# Patient Record
Sex: MALE | Race: OTHER | Employment: OTHER | URBAN - METROPOLITAN AREA
[De-identification: names, ages, dates, MRNs, and addresses within clinical notes are randomized per-mention and may not be internally consistent; named-entity substitution may affect disease eponyms.]

---

## 2017-03-13 PROBLEM — M51.9 LUMBAR DISC DISEASE: Status: ACTIVE | Noted: 2017-03-13

## 2017-04-21 PROBLEM — I63.9 CRYPTOGENIC STROKE (HCC): Status: ACTIVE | Noted: 2017-04-21

## 2018-03-15 ENCOUNTER — TELEPHONE (OUTPATIENT)
Dept: NON INVASIVE DIAGNOSTICS | Age: 73
End: 2018-03-15

## 2018-03-15 NOTE — TELEPHONE ENCOUNTER
----- Message from Roman Min RN sent at 3/8/2018  4:02 PM EST -----  Successful transmission received. Please call patient and give next appointment.
We have received your remote transmission. Our staff will contact you if there is anything that needs to be discussed. Your next remote is on 4/9/2018.
28

## 2018-04-09 ENCOUNTER — NURSE ONLY (OUTPATIENT)
Dept: NON INVASIVE DIAGNOSTICS | Age: 73
End: 2018-04-09
Payer: MEDICARE

## 2018-04-09 ENCOUNTER — OFFICE VISIT (OUTPATIENT)
Dept: VASCULAR SURGERY | Age: 73
End: 2018-04-09
Payer: MEDICARE

## 2018-04-09 DIAGNOSIS — I65.23 ASYMPTOMATIC BILATERAL CAROTID ARTERY STENOSIS: Primary | Chronic | ICD-10-CM

## 2018-04-09 DIAGNOSIS — I63.9 CRYPTOGENIC STROKE (HCC): ICD-10-CM

## 2018-04-09 DIAGNOSIS — Z95.818 STATUS POST PLACEMENT OF IMPLANTABLE LOOP RECORDER: Primary | ICD-10-CM

## 2018-04-09 PROCEDURE — 99213 OFFICE O/P EST LOW 20 MIN: CPT | Performed by: SURGERY

## 2018-04-09 PROCEDURE — 93299 PR PR INTERRO EVALM REMOTE, UP TO 30 DAYS: CPT | Performed by: INTERNAL MEDICINE

## 2018-04-09 PROCEDURE — 93298 REM INTERROG DEV EVAL SCRMS: CPT | Performed by: INTERNAL MEDICINE

## 2018-04-11 ENCOUNTER — TELEPHONE (OUTPATIENT)
Dept: NON INVASIVE DIAGNOSTICS | Age: 73
End: 2018-04-11

## 2018-04-11 ENCOUNTER — HOSPITAL ENCOUNTER (OUTPATIENT)
Dept: CARDIOLOGY | Age: 73
Discharge: HOME | End: 2018-04-11
Payer: MEDICARE

## 2018-04-11 DIAGNOSIS — I65.23 BILATERAL CAROTID ARTERY STENOSIS: ICD-10-CM

## 2018-04-11 DIAGNOSIS — I65.23 ASYMPTOMATIC BILATERAL CAROTID ARTERY STENOSIS: Chronic | ICD-10-CM

## 2018-04-11 PROCEDURE — 93880 EXTRACRANIAL BILAT STUDY: CPT

## 2018-04-16 ENCOUNTER — TELEPHONE (OUTPATIENT)
Dept: VASCULAR SURGERY | Age: 73
End: 2018-04-16

## 2018-04-18 ENCOUNTER — TELEPHONE (OUTPATIENT)
Dept: VASCULAR SURGERY | Age: 73
End: 2018-04-18

## 2018-05-10 ENCOUNTER — NURSE ONLY (OUTPATIENT)
Dept: NON INVASIVE DIAGNOSTICS | Age: 73
End: 2018-05-10
Payer: MEDICARE

## 2018-05-10 DIAGNOSIS — I63.9 CRYPTOGENIC STROKE (HCC): ICD-10-CM

## 2018-05-10 DIAGNOSIS — Z95.818 STATUS POST PLACEMENT OF IMPLANTABLE LOOP RECORDER: Primary | ICD-10-CM

## 2018-05-10 PROCEDURE — 93298 REM INTERROG DEV EVAL SCRMS: CPT | Performed by: INTERNAL MEDICINE

## 2018-05-10 PROCEDURE — 93299 PR PR INTERRO EVALM REMOTE, UP TO 30 DAYS: CPT | Performed by: INTERNAL MEDICINE

## 2018-06-11 ENCOUNTER — NURSE ONLY (OUTPATIENT)
Dept: NON INVASIVE DIAGNOSTICS | Age: 73
End: 2018-06-11
Payer: MEDICARE

## 2018-06-11 DIAGNOSIS — I63.9 CRYPTOGENIC STROKE (HCC): ICD-10-CM

## 2018-06-11 DIAGNOSIS — Z95.818 STATUS POST PLACEMENT OF IMPLANTABLE LOOP RECORDER: Primary | ICD-10-CM

## 2018-06-11 PROCEDURE — 93299 PR PR INTERRO EVALM REMOTE, UP TO 30 DAYS: CPT | Performed by: INTERNAL MEDICINE

## 2018-06-11 PROCEDURE — 93298 REM INTERROG DEV EVAL SCRMS: CPT | Performed by: INTERNAL MEDICINE

## 2018-07-12 ENCOUNTER — NURSE ONLY (OUTPATIENT)
Dept: NON INVASIVE DIAGNOSTICS | Age: 73
End: 2018-07-12
Payer: MEDICARE

## 2018-07-12 DIAGNOSIS — Z95.818 STATUS POST PLACEMENT OF IMPLANTABLE LOOP RECORDER: Primary | ICD-10-CM

## 2018-07-12 DIAGNOSIS — I63.9 CRYPTOGENIC STROKE (HCC): ICD-10-CM

## 2018-07-12 PROCEDURE — 93299 PR PR INTERRO EVALM REMOTE, UP TO 30 DAYS: CPT | Performed by: INTERNAL MEDICINE

## 2018-07-12 PROCEDURE — 93298 REM INTERROG DEV EVAL SCRMS: CPT | Performed by: INTERNAL MEDICINE

## 2018-07-17 ENCOUNTER — TELEPHONE (OUTPATIENT)
Dept: NON INVASIVE DIAGNOSTICS | Age: 73
End: 2018-07-17

## 2018-07-17 NOTE — TELEPHONE ENCOUNTER
----- Message from Qasim De La Cruz RN sent at 7/17/2018  8:01 AM EDT -----  Successful transmission received. Please call patient and give next appointment.

## 2018-07-17 NOTE — PROGRESS NOTES
See [employer] [place] report. Remote monitoring reviewed over a 30 day period. End of 30 day monitoring period date of service 7-.

## 2018-07-17 NOTE — TELEPHONE ENCOUNTER
We have received your remote transmission. Our staff will contact you if there is anything that needs to be discussed. Your next appointment is 08/13/2018 remote transmission from home.

## 2018-08-13 ENCOUNTER — NURSE ONLY (OUTPATIENT)
Dept: NON INVASIVE DIAGNOSTICS | Age: 73
End: 2018-08-13
Payer: MEDICARE

## 2018-08-13 ENCOUNTER — TELEPHONE (OUTPATIENT)
Dept: NON INVASIVE DIAGNOSTICS | Age: 73
End: 2018-08-13

## 2018-08-13 DIAGNOSIS — I63.9 CRYPTOGENIC STROKE (HCC): ICD-10-CM

## 2018-08-13 DIAGNOSIS — Z95.818 STATUS POST PLACEMENT OF IMPLANTABLE LOOP RECORDER: Primary | ICD-10-CM

## 2018-08-13 PROCEDURE — 93298 REM INTERROG DEV EVAL SCRMS: CPT | Performed by: INTERNAL MEDICINE

## 2018-08-13 PROCEDURE — 93299 PR PR INTERRO EVALM REMOTE, UP TO 30 DAYS: CPT | Performed by: INTERNAL MEDICINE

## 2018-09-13 ENCOUNTER — NURSE ONLY (OUTPATIENT)
Dept: NON INVASIVE DIAGNOSTICS | Age: 73
End: 2018-09-13
Payer: MEDICARE

## 2018-09-13 DIAGNOSIS — Z95.818 STATUS POST PLACEMENT OF IMPLANTABLE LOOP RECORDER: Primary | ICD-10-CM

## 2018-09-13 DIAGNOSIS — I63.9 CRYPTOGENIC STROKE (HCC): ICD-10-CM

## 2018-09-13 PROCEDURE — 93298 REM INTERROG DEV EVAL SCRMS: CPT | Performed by: INTERNAL MEDICINE

## 2018-09-13 PROCEDURE — 93299 PR PR INTERRO EVALM REMOTE, UP TO 30 DAYS: CPT | Performed by: INTERNAL MEDICINE

## 2018-09-14 ENCOUNTER — TELEPHONE (OUTPATIENT)
Dept: NON INVASIVE DIAGNOSTICS | Age: 73
End: 2018-09-14

## 2018-09-14 NOTE — TELEPHONE ENCOUNTER
----- Message from Valentino Alamo, RN sent at 9/14/2018  7:54 AM EDT -----  Successful transmission received. Please call patient and give next appointment.

## 2018-10-15 ENCOUNTER — NURSE ONLY (OUTPATIENT)
Dept: NON INVASIVE DIAGNOSTICS | Age: 73
End: 2018-10-15
Payer: MEDICARE

## 2018-10-15 DIAGNOSIS — Z95.818 STATUS POST PLACEMENT OF IMPLANTABLE LOOP RECORDER: Primary | ICD-10-CM

## 2018-10-15 DIAGNOSIS — I63.9 CRYPTOGENIC STROKE (HCC): ICD-10-CM

## 2018-10-15 PROCEDURE — 93298 REM INTERROG DEV EVAL SCRMS: CPT | Performed by: INTERNAL MEDICINE

## 2018-10-15 PROCEDURE — 93299 PR PR INTERRO EVALM REMOTE, UP TO 30 DAYS: CPT | Performed by: INTERNAL MEDICINE

## 2018-10-22 ENCOUNTER — TELEPHONE (OUTPATIENT)
Dept: NON INVASIVE DIAGNOSTICS | Age: 73
End: 2018-10-22

## 2018-11-15 ENCOUNTER — NURSE ONLY (OUTPATIENT)
Dept: NON INVASIVE DIAGNOSTICS | Age: 73
End: 2018-11-15
Payer: MEDICARE

## 2018-11-15 DIAGNOSIS — I63.9 CRYPTOGENIC STROKE (HCC): ICD-10-CM

## 2018-11-15 DIAGNOSIS — Z95.818 STATUS POST PLACEMENT OF IMPLANTABLE LOOP RECORDER: Primary | ICD-10-CM

## 2018-11-15 PROCEDURE — 93298 REM INTERROG DEV EVAL SCRMS: CPT | Performed by: INTERNAL MEDICINE

## 2018-11-15 PROCEDURE — 93299 PR PR INTERRO EVALM REMOTE, UP TO 30 DAYS: CPT | Performed by: INTERNAL MEDICINE

## 2018-11-21 ENCOUNTER — TELEPHONE (OUTPATIENT)
Dept: NON INVASIVE DIAGNOSTICS | Age: 73
End: 2018-11-21

## 2018-12-17 ENCOUNTER — NURSE ONLY (OUTPATIENT)
Dept: NON INVASIVE DIAGNOSTICS | Age: 73
End: 2018-12-17
Payer: MEDICARE

## 2018-12-17 DIAGNOSIS — Z95.818 STATUS POST PLACEMENT OF IMPLANTABLE LOOP RECORDER: Primary | ICD-10-CM

## 2018-12-17 DIAGNOSIS — I63.9 CRYPTOGENIC STROKE (HCC): ICD-10-CM

## 2018-12-17 PROCEDURE — 93298 REM INTERROG DEV EVAL SCRMS: CPT | Performed by: INTERNAL MEDICINE

## 2018-12-17 PROCEDURE — 93299 PR PR INTERRO EVALM REMOTE, UP TO 30 DAYS: CPT | Performed by: INTERNAL MEDICINE

## 2018-12-21 ENCOUNTER — TELEPHONE (OUTPATIENT)
Dept: NON INVASIVE DIAGNOSTICS | Age: 73
End: 2018-12-21

## 2019-01-17 ENCOUNTER — NURSE ONLY (OUTPATIENT)
Dept: NON INVASIVE DIAGNOSTICS | Age: 74
End: 2019-01-17
Payer: MEDICARE

## 2019-01-17 DIAGNOSIS — I63.9 CRYPTOGENIC STROKE (HCC): Primary | ICD-10-CM

## 2019-01-17 DIAGNOSIS — Z95.818 STATUS POST PLACEMENT OF IMPLANTABLE LOOP RECORDER: ICD-10-CM

## 2019-01-17 PROCEDURE — 93299 PR REM INTERROG ICPMS/SCRMS <30 D TECH REVIEW: CPT | Performed by: INTERNAL MEDICINE

## 2019-01-17 PROCEDURE — 93298 REM INTERROG DEV EVAL SCRMS: CPT | Performed by: INTERNAL MEDICINE

## 2019-01-22 ENCOUNTER — TELEPHONE (OUTPATIENT)
Dept: NON INVASIVE DIAGNOSTICS | Age: 74
End: 2019-01-22

## 2019-01-28 ENCOUNTER — HOSPITAL ENCOUNTER (OUTPATIENT)
Age: 74
Discharge: HOME | End: 2019-01-30
Payer: MEDICARE

## 2019-01-28 DIAGNOSIS — I25.10 CORONARY ARTERY DISEASE INVOLVING NATIVE CORONARY ARTERY OF NATIVE HEART WITHOUT ANGINA PECTORIS: ICD-10-CM

## 2019-01-28 DIAGNOSIS — N40.0 BENIGN PROSTATIC HYPERPLASIA WITHOUT LOWER URINARY TRACT SYMPTOMS: ICD-10-CM

## 2019-01-28 PROCEDURE — 80061 LIPID PANEL: CPT

## 2019-01-28 PROCEDURE — 85025 COMPLETE CBC W/AUTO DIFF WBC: CPT

## 2019-01-28 PROCEDURE — 80053 COMPREHEN METABOLIC PANEL: CPT

## 2019-01-28 PROCEDURE — 84153 ASSAY OF PSA TOTAL: CPT

## 2019-01-28 PROCEDURE — 83036 HEMOGLOBIN GLYCOSYLATED A1C: CPT

## 2019-01-29 LAB
ALBUMIN SERPL-MCNC: 4 G/DL (ref 3.5–5.2)
ALP BLD-CCNC: 94 U/L (ref 40–129)
ALT SERPL-CCNC: 26 U/L (ref 0–40)
ANION GAP SERPL CALCULATED.3IONS-SCNC: 18 MMOL/L (ref 7–16)
AST SERPL-CCNC: 30 U/L (ref 0–39)
BASOPHILS ABSOLUTE: 0.07 E9/L (ref 0–0.2)
BASOPHILS RELATIVE PERCENT: 1 % (ref 0–2)
BILIRUB SERPL-MCNC: 0.9 MG/DL (ref 0–1.2)
BUN BLDV-MCNC: 15 MG/DL (ref 8–23)
CALCIUM SERPL-MCNC: 9.7 MG/DL (ref 8.6–10.2)
CHLORIDE BLD-SCNC: 103 MMOL/L (ref 98–107)
CHOLESTEROL, TOTAL: 172 MG/DL (ref 0–199)
CO2: 20 MMOL/L (ref 22–29)
CREAT SERPL-MCNC: 1.1 MG/DL (ref 0.7–1.2)
EOSINOPHILS ABSOLUTE: 0.43 E9/L (ref 0.05–0.5)
EOSINOPHILS RELATIVE PERCENT: 6.4 % (ref 0–6)
GFR AFRICAN AMERICAN: >60
GFR NON-AFRICAN AMERICAN: >60 ML/MIN/1.73
GLUCOSE BLD-MCNC: 113 MG/DL (ref 74–99)
HBA1C MFR BLD: 6.3 % (ref 4–5.6)
HCT VFR BLD CALC: 46.8 % (ref 37–54)
HDLC SERPL-MCNC: 40 MG/DL
HEMOGLOBIN: 14.6 G/DL (ref 12.5–16.5)
IMMATURE GRANULOCYTES #: 0.02 E9/L
IMMATURE GRANULOCYTES %: 0.3 % (ref 0–5)
LDL CHOLESTEROL CALCULATED: 91 MG/DL (ref 0–99)
LYMPHOCYTES ABSOLUTE: 1.79 E9/L (ref 1.5–4)
LYMPHOCYTES RELATIVE PERCENT: 26.5 % (ref 20–42)
MCH RBC QN AUTO: 30.4 PG (ref 26–35)
MCHC RBC AUTO-ENTMCNC: 31.2 % (ref 32–34.5)
MCV RBC AUTO: 97.5 FL (ref 80–99.9)
MONOCYTES ABSOLUTE: 0.65 E9/L (ref 0.1–0.95)
MONOCYTES RELATIVE PERCENT: 9.6 % (ref 2–12)
NEUTROPHILS ABSOLUTE: 3.79 E9/L (ref 1.8–7.3)
NEUTROPHILS RELATIVE PERCENT: 56.2 % (ref 43–80)
PDW BLD-RTO: 14.3 FL (ref 11.5–15)
PLATELET # BLD: 212 E9/L (ref 130–450)
PMV BLD AUTO: 12.6 FL (ref 7–12)
POTASSIUM SERPL-SCNC: 4.3 MMOL/L (ref 3.5–5)
PROSTATE SPECIFIC ANTIGEN: 1.88 NG/ML (ref 0–4)
RBC # BLD: 4.8 E12/L (ref 3.8–5.8)
SODIUM BLD-SCNC: 141 MMOL/L (ref 132–146)
TOTAL PROTEIN: 7.4 G/DL (ref 6.4–8.3)
TRIGL SERPL-MCNC: 206 MG/DL (ref 0–149)
VLDLC SERPL CALC-MCNC: 41 MG/DL
WBC # BLD: 6.8 E9/L (ref 4.5–11.5)

## 2019-02-22 ENCOUNTER — NURSE ONLY (OUTPATIENT)
Dept: NON INVASIVE DIAGNOSTICS | Age: 74
End: 2019-02-22
Payer: MEDICARE

## 2019-02-22 DIAGNOSIS — Z95.818 STATUS POST PLACEMENT OF IMPLANTABLE LOOP RECORDER: Primary | ICD-10-CM

## 2019-02-22 DIAGNOSIS — I63.9 CRYPTOGENIC STROKE (HCC): ICD-10-CM

## 2019-02-22 PROCEDURE — 93299 PR REM INTERROG ICPMS/SCRMS <30 D TECH REVIEW: CPT | Performed by: INTERNAL MEDICINE

## 2019-02-22 PROCEDURE — 93298 REM INTERROG DEV EVAL SCRMS: CPT | Performed by: INTERNAL MEDICINE

## 2019-02-25 ENCOUNTER — TELEPHONE (OUTPATIENT)
Dept: NON INVASIVE DIAGNOSTICS | Age: 74
End: 2019-02-25

## 2019-03-20 ENCOUNTER — OFFICE VISIT (OUTPATIENT)
Dept: CARDIOLOGY CLINIC | Age: 74
End: 2019-03-20
Payer: MEDICARE

## 2019-03-20 VITALS
WEIGHT: 141.2 LBS | BODY MASS INDEX: 22.69 KG/M2 | SYSTOLIC BLOOD PRESSURE: 128 MMHG | RESPIRATION RATE: 16 BRPM | HEIGHT: 66 IN | DIASTOLIC BLOOD PRESSURE: 72 MMHG | HEART RATE: 63 BPM

## 2019-03-20 DIAGNOSIS — I25.10 CORONARY ARTERY DISEASE INVOLVING NATIVE CORONARY ARTERY OF NATIVE HEART WITHOUT ANGINA PECTORIS: Primary | ICD-10-CM

## 2019-03-20 PROCEDURE — 93000 ELECTROCARDIOGRAM COMPLETE: CPT | Performed by: INTERNAL MEDICINE

## 2019-03-20 PROCEDURE — 99213 OFFICE O/P EST LOW 20 MIN: CPT | Performed by: INTERNAL MEDICINE

## 2019-03-20 RX ORDER — TRAMADOL HYDROCHLORIDE 50 MG/1
TABLET ORAL
Refills: 0 | COMMUNITY
Start: 2019-02-22 | End: 2019-03-20 | Stop reason: SDUPTHER

## 2019-03-27 ENCOUNTER — OFFICE VISIT (OUTPATIENT)
Dept: NON INVASIVE DIAGNOSTICS | Age: 74
End: 2019-03-27
Payer: MEDICARE

## 2019-03-27 VITALS
WEIGHT: 140 LBS | HEIGHT: 66 IN | RESPIRATION RATE: 16 BRPM | BODY MASS INDEX: 22.5 KG/M2 | HEART RATE: 64 BPM | DIASTOLIC BLOOD PRESSURE: 68 MMHG | SYSTOLIC BLOOD PRESSURE: 138 MMHG

## 2019-03-27 DIAGNOSIS — Z95.818 STATUS POST PLACEMENT OF IMPLANTABLE LOOP RECORDER: Primary | ICD-10-CM

## 2019-03-27 PROCEDURE — 93285 PRGRMG DEV EVAL SCRMS IP: CPT | Performed by: NURSE PRACTITIONER

## 2019-03-27 PROCEDURE — 99213 OFFICE O/P EST LOW 20 MIN: CPT | Performed by: NURSE PRACTITIONER

## 2019-03-27 ASSESSMENT — ENCOUNTER SYMPTOMS
CHEST TIGHTNESS: 0
SHORTNESS OF BREATH: 0

## 2019-04-11 ENCOUNTER — NURSE ONLY (OUTPATIENT)
Dept: NON INVASIVE DIAGNOSTICS | Age: 74
End: 2019-04-11
Payer: MEDICARE

## 2019-04-11 DIAGNOSIS — Z95.818 STATUS POST PLACEMENT OF IMPLANTABLE LOOP RECORDER: Primary | ICD-10-CM

## 2019-04-11 DIAGNOSIS — I63.9 CRYPTOGENIC STROKE (HCC): ICD-10-CM

## 2019-04-11 PROCEDURE — 93299 PR REM INTERROG ICPMS/SCRMS <30 D TECH REVIEW: CPT | Performed by: INTERNAL MEDICINE

## 2019-04-11 PROCEDURE — 93298 REM INTERROG DEV EVAL SCRMS: CPT | Performed by: INTERNAL MEDICINE

## 2019-04-15 ENCOUNTER — OFFICE VISIT (OUTPATIENT)
Dept: VASCULAR SURGERY | Age: 74
End: 2019-04-15
Payer: MEDICARE

## 2019-04-15 DIAGNOSIS — I65.23 ASYMPTOMATIC BILATERAL CAROTID ARTERY STENOSIS: Primary | ICD-10-CM

## 2019-04-15 PROCEDURE — 99213 OFFICE O/P EST LOW 20 MIN: CPT | Performed by: SURGERY

## 2019-04-15 NOTE — PROGRESS NOTES
Vascular Surgery Outpatient Followup    PCP : Mary Hyman DO   EP : Dr. Briana Mclean  Neurology : Dr. Santhosh Roberts:    The patient is a 66 y.o. male who is here in regards to follow up his assx carotid disease. He continues to have issues with chronic L LE pain, numbness, and tingling. He has pain and a burning sensation in both legs that starts in his lower back. He describes these pains as shooting pains and rates it 9/10. The pain is generally only at night while he is lying down. He denies and palliating or exacerbating factors. He takes no medication for it. He denies rest pain, open wounds, or claudicating symptoms. 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 He had no significant improvement.      No sxs of stroke, tia, slurred speech, focal weakness since seen last.      He was originally seen in October 2016 while he was admitted with an episode of double vision and dizziness. There was a concern that he may have had a vertebral basilar TIA. He had rapid resolution of his symptoms and no subsequent recurrence. There was no stroke imaged on MR imaging.     He had a link monitor placed by Dr. Briana Mclean from [employer]. He continues to follow with him.   He was noted to have a PFO on echo.       Past Medical History:        Diagnosis Date    Anxiety     Asymptomatic bilateral carotid artery stenosis     Bowel obstruction (HCC)     CAD (coronary artery disease)     [facility]ch[facility]     Cryptogenic stroke [facility] [facility]     NSTEMI (non-ST elevated myocardial infarction) [facility])      Past Surgical History:        Procedure Laterality Date    BACK SURGERY      CARDIAC CATHETERIZATION  4/26/2016    Dr Ted Valdez GRAFT  4/27/16    x3    DIAGNOSTIC CARDIAC CATH LAB PROCEDURE  04/26/2016    INSERTABLE CARDIAC MONITOR Left 10/11/2016    LINQ loop recorder, [employer], Dr. Briana Mclean Current Medications:   Current Outpatient Medications   Medication Sig Dispense Refill    zolpidem (AMBIEN) 10 MG tablet Take 1 tablet by mouth every evening for 30 days. 30 tablet 0    traMADol (ULTRAM) 50 MG tablet Take 1 tablet by mouth every 8 hours as needed for Pain for up to 30 days. 90 tablet 0    metoprolol tartrate (LOPRESSOR) 25 MG tablet TAKE 1/2 TABLET BY MOUTH TWICE DAILY 90 tablet 3    atorvastatin (LIPITOR) 40 MG tablet       clopidogrel (PLAVIX) 75 MG tablet Take 1 tablet by mouth daily 30 tablet 3    pantoprazole (PROTONIX) 40 MG tablet TK 1 T PO QD  3    gabapentin (NEURONTIN) 300 MG capsule TAKE ONE CAPSULE BY MOUTH TWICE DAILY FOR 30 DAYS 60 capsule 3     No current facility-administered medications for this visit. Allergies:  Patient has no known allergies.   Social History     Socioeconomic History    Marital status:      Spouse name: Not on file    Number of children: Not on file    Years of education: Not on file    Highest education level: Not on file   Occupational History    Occupation: - retired   Social Needs    Financial resource strain: Not on file    Food insecurity:     Worry: Not on file     Inability: Not on file   [place] needs:     Medical: Not on file     Non-medical: Not on file   Tobacco Use    Smoking status: Former Smoker     Packs/day: 1.00     Years: 56.00     Pack years: 56.00     Types: Cigarettes     Start date:      Last attempt to quit: 2016     Years since quittin.9    Smokeless tobacco: Never Used   Substance and Sexual Activity    Alcohol use: No    Drug use: No    Sexual activity: Not Currently   Lifestyle    Physical activity:     Days per week: Not on file     Minutes per session: Not on file    Stress: Not on file   Relationships    Social connections:     Talks on phone: Not on file     Gets together: Not on file     Attends Christianity service: Not on file     Active member of club or organization: Not on file     Attends meetings of clubs or organizations: Not on file     Relationship status: Not on file    Intimate partner violence:     Fear of current or ex partner: Not on file     Emotionally abused: Not on file     Physically abused: Not on file     Forced sexual activity: Not on file   Other Topics Concern    Not on file   Social History Narrative    Not on file     Family History   Problem Relation Age of Onset    Heart Surgery Mother     Hypertension Mother     Heart Attack Father     Heart Surgery Brother      Labs  Lab Results   Component Value Date    WBC 6.8 01/28/2019    HGB 14.6 01/28/2019    HCT 46.8 01/28/2019     01/28/2019    PROTIME 11.9 10/10/2016    INR 1.1 10/10/2016    APTT 28.4 10/05/2016    K 4.3 01/28/2019    BUN 15 01/28/2019    CREATININE 1.1 01/28/2019       PHYSICAL EXAM:    CONSTITUTIONAL:   Awake, alert, cooperative  PSYCHIATRIC :  Oriented to time, place and person     Appropriate insight to disease process  EYES: Lids and lashes normal  ENT:  External ears and nose without lesions   Hearing deficits absent  NECK: Supple, symmetrical, trachea midline   Carotid bruit not noted  LUNGS:  No increased work of breathing                 Clear to auscultation  CARDIOVASCULAR:  regular rate and rhythm   ABDOMEN:  soft, non-distended, non-tender   Aorta is not palpable  SKIN:   Normal skin color   Texture and turgor normal, no induration  EXTREMITIES:   R LE Edema none  L LE Edema none    Decreased sensation in medial aspect of calf   R popliteal 2+ L popliteal 2+   R posterior tibial 2+ L posterior tibial 2+     RADIOLOGY:    A/P Asymptomatic Bilateral Carotid Stenosis  · He did not have imaging study done today  · US from 4/2018 reveals   ? R ICA 1-49% stenosis which was stable at that time  ?  L ICA 50-59% stenosis which was stable at that time  · Continue medical management with plavix and statin  · Emphasized importance of continued Tobacco cessation  · No indication for surgical intervention at this time  · Discussed with patient pathophysiology of carotid stenosis and all ?s answered  · Discussed with patient symptoms of stroke, TIA and they understood to go to ER immediately if any symptoms developed  · Will obtain us in near future  · F/U as outpatient in 12 Months with repeat carotid duplex assuming no significant changes    Bilateral LE Pain, numbness and tingling  Chronic back pain with sciatica  · He is not having symptoms of rest pain, tissue loss, or claudication  · Symptoms continue to be an issue  · F/u as needed at [facility]    Pt seen and plan reviewed with Dr. Madhavi Gao.      Alley Montoya, CNP

## 2019-04-16 ENCOUNTER — HOSPITAL ENCOUNTER (OUTPATIENT)
Dept: CARDIOLOGY | Age: 74
Discharge: HOME | End: 2019-04-16
Payer: MEDICARE

## 2019-04-16 ENCOUNTER — TELEPHONE (OUTPATIENT)
Dept: NON INVASIVE DIAGNOSTICS | Age: 74
End: 2019-04-16

## 2019-04-16 DIAGNOSIS — I65.23 ASYMPTOMATIC BILATERAL CAROTID ARTERY STENOSIS: ICD-10-CM

## 2019-04-16 PROCEDURE — 93880 EXTRACRANIAL BILAT STUDY: CPT

## 2019-04-16 NOTE — TELEPHONE ENCOUNTER
----- Message from Belinda Jurado RN sent at 4/16/2019 10:40 AM EDT -----  Successful transmission received. Please call patient and give next appointment.

## 2019-04-16 NOTE — TELEPHONE ENCOUNTER
We have received your remote transmission. Our staff will contact you if there is anything that needs to be discussed. Your next appointment is 05/14/2019 remote transmission from home    Pt is non-wireless.

## 2019-04-16 NOTE — PROGRESS NOTES
See [employer] [place] report. Remote monitoring reviewed over a 30 day period. End of 30 day monitoring period date of service 4-11-19.

## 2019-04-16 NOTE — PROGRESS NOTES
[facility] - [facility]    This is a pre read worksheet - prior to official physician interpretation    Chulaюлия Leandra  1945  Date of study: 4/16/19    Indication for study:  Carotid artery stenosis  Study : Bilateral Carotid Artery Duplex Examination    Duplex examination of the RIGHT carotid artery system identifies atherosclerotic plaque. The peak systolic velocity in internal carotid artery was 118 centimeters / second. The maximum end diastolic velocity was 30 centimeters / second. The ICA/CCA ratio is 1.2. The right vertebral artery has antegrade flow. Duplex examination of the LEFT carotid artery system identifies atherosclerotic plaque. The peak systolic velocity in internal carotid artery was 170 centimeters / second. The maximum end diastolic velocity was 40 centimeters / second. The ICA/CCA ratio is 1.7. The left vertebral artery has antegrade flow.         LAST STUDY  4/11/2018  Rt 1-49  Lt 50-59

## 2019-04-23 ENCOUNTER — TELEPHONE (OUTPATIENT)
Dept: VASCULAR SURGERY | Age: 74
End: 2019-04-23

## 2019-04-23 NOTE — TELEPHONE ENCOUNTER
Called and spoke to patient regarding results of carotid ultrasound. Stable bilateral carotid artery stenosis. Plan to see back in one year with another ultrasound.      Robyn Edmondson, CNP

## 2019-04-29 ENCOUNTER — HOSPITAL ENCOUNTER (OUTPATIENT)
Age: 74
Discharge: HOME | End: 2019-05-01
Payer: MEDICARE

## 2019-04-29 PROBLEM — R73.03 PREDIABETES: Status: ACTIVE | Noted: 2019-04-29

## 2019-04-29 PROCEDURE — 85025 COMPLETE CBC W/AUTO DIFF WBC: CPT

## 2019-04-29 PROCEDURE — 80061 LIPID PANEL: CPT

## 2019-04-29 PROCEDURE — 82044 UR ALBUMIN SEMIQUANTITATIVE: CPT

## 2019-04-29 PROCEDURE — 83036 HEMOGLOBIN GLYCOSYLATED A1C: CPT

## 2019-04-29 PROCEDURE — 80053 COMPREHEN METABOLIC PANEL: CPT

## 2019-04-29 PROCEDURE — 82570 ASSAY OF URINE CREATININE: CPT

## 2019-04-30 DIAGNOSIS — R73.03 PREDIABETES: ICD-10-CM

## 2019-04-30 DIAGNOSIS — I10 ESSENTIAL HYPERTENSION: ICD-10-CM

## 2019-04-30 LAB
ALBUMIN SERPL-MCNC: 4.1 G/DL (ref 3.5–5.2)
ALP BLD-CCNC: 105 U/L (ref 40–129)
ALT SERPL-CCNC: 29 U/L (ref 0–40)
ANION GAP SERPL CALCULATED.3IONS-SCNC: 12 MMOL/L (ref 7–16)
AST SERPL-CCNC: 41 U/L (ref 0–39)
BASOPHILS ABSOLUTE: 0.06 E9/L (ref 0–0.2)
BASOPHILS RELATIVE PERCENT: 0.9 % (ref 0–2)
BILIRUB SERPL-MCNC: 0.6 MG/DL (ref 0–1.2)
BUN BLDV-MCNC: 15 MG/DL (ref 8–23)
CALCIUM SERPL-MCNC: 9.5 MG/DL (ref 8.6–10.2)
CHLORIDE BLD-SCNC: 105 MMOL/L (ref 98–107)
CHOLESTEROL, TOTAL: 131 MG/DL (ref 0–199)
CO2: 24 MMOL/L (ref 22–29)
CREAT SERPL-MCNC: 1.1 MG/DL (ref 0.7–1.2)
CREATININE URINE: 210 MG/DL (ref 40–278)
EOSINOPHILS ABSOLUTE: 0.4 E9/L (ref 0.05–0.5)
EOSINOPHILS RELATIVE PERCENT: 5.7 % (ref 0–6)
GFR AFRICAN AMERICAN: >60
GFR NON-AFRICAN AMERICAN: >60 ML/MIN/1.73
GLUCOSE BLD-MCNC: 111 MG/DL (ref 74–99)
HBA1C MFR BLD: 5.8 % (ref 4–5.6)
HCT VFR BLD CALC: 45 % (ref 37–54)
HDLC SERPL-MCNC: 33 MG/DL
HEMOGLOBIN: 14.1 G/DL (ref 12.5–16.5)
IMMATURE GRANULOCYTES #: 0.02 E9/L
IMMATURE GRANULOCYTES %: 0.3 % (ref 0–5)
LDL CHOLESTEROL CALCULATED: 73 MG/DL (ref 0–99)
LYMPHOCYTES ABSOLUTE: 1.91 E9/L (ref 1.5–4)
LYMPHOCYTES RELATIVE PERCENT: 27.4 % (ref 20–42)
MCH RBC QN AUTO: 30.9 PG (ref 26–35)
MCHC RBC AUTO-ENTMCNC: 31.3 % (ref 32–34.5)
MCV RBC AUTO: 98.7 FL (ref 80–99.9)
MICROALBUMIN UR-MCNC: <12 MG/L
MICROALBUMIN/CREAT UR-RTO: ABNORMAL (ref 0–30)
MONOCYTES ABSOLUTE: 0.68 E9/L (ref 0.1–0.95)
MONOCYTES RELATIVE PERCENT: 9.8 % (ref 2–12)
NEUTROPHILS ABSOLUTE: 3.89 E9/L (ref 1.8–7.3)
NEUTROPHILS RELATIVE PERCENT: 55.9 % (ref 43–80)
PDW BLD-RTO: 14.9 FL (ref 11.5–15)
PLATELET # BLD: 213 E9/L (ref 130–450)
PMV BLD AUTO: 12.5 FL (ref 7–12)
POTASSIUM SERPL-SCNC: 4.6 MMOL/L (ref 3.5–5)
RBC # BLD: 4.56 E12/L (ref 3.8–5.8)
SODIUM BLD-SCNC: 141 MMOL/L (ref 132–146)
TOTAL PROTEIN: 7.5 G/DL (ref 6.4–8.3)
TRIGL SERPL-MCNC: 126 MG/DL (ref 0–149)
VLDLC SERPL CALC-MCNC: 25 MG/DL
WBC # BLD: 7 E9/L (ref 4.5–11.5)

## 2019-05-14 ENCOUNTER — NURSE ONLY (OUTPATIENT)
Dept: NON INVASIVE DIAGNOSTICS | Age: 74
End: 2019-05-14
Payer: MEDICARE

## 2019-05-14 DIAGNOSIS — I63.9 CRYPTOGENIC STROKE (HCC): ICD-10-CM

## 2019-05-14 DIAGNOSIS — Z95.818 STATUS POST PLACEMENT OF IMPLANTABLE LOOP RECORDER: Primary | ICD-10-CM

## 2019-05-14 PROCEDURE — 93299 PR REM INTERROG ICPMS/SCRMS <30 D TECH REVIEW: CPT | Performed by: INTERNAL MEDICINE

## 2019-05-14 PROCEDURE — 93298 REM INTERROG DEV EVAL SCRMS: CPT | Performed by: INTERNAL MEDICINE

## 2019-05-20 ENCOUNTER — TELEPHONE (OUTPATIENT)
Dept: NON INVASIVE DIAGNOSTICS | Age: 74
End: 2019-05-20

## 2019-05-20 NOTE — PROGRESS NOTES
See [employer] [place] report. Remote monitoring reviewed over a 30 day period. End of 30 day monitoring period date of service 5-14-19.

## 2019-05-20 NOTE — TELEPHONE ENCOUNTER
We have received your remote transmission. Our staff will contact you if there is anything that needs to be discussed. Your next appointment is 06/14/2019 remote transmission from home    Pt is non-wireless.

## 2019-06-14 ENCOUNTER — NURSE ONLY (OUTPATIENT)
Dept: NON INVASIVE DIAGNOSTICS | Age: 74
End: 2019-06-14
Payer: MEDICARE

## 2019-06-14 DIAGNOSIS — Z95.818 STATUS POST PLACEMENT OF IMPLANTABLE LOOP RECORDER: Primary | ICD-10-CM

## 2019-06-14 DIAGNOSIS — I63.9 CRYPTOGENIC STROKE (HCC): ICD-10-CM

## 2019-06-14 PROCEDURE — 93299 PR REM INTERROG ICPMS/SCRMS <30 D TECH REVIEW: CPT | Performed by: INTERNAL MEDICINE

## 2019-06-14 PROCEDURE — 93298 REM INTERROG DEV EVAL SCRMS: CPT | Performed by: INTERNAL MEDICINE

## 2019-07-01 ENCOUNTER — TELEPHONE (OUTPATIENT)
Dept: NON INVASIVE DIAGNOSTICS | Age: 74
End: 2019-07-01

## 2019-07-15 ENCOUNTER — NURSE ONLY (OUTPATIENT)
Dept: NON INVASIVE DIAGNOSTICS | Age: 74
End: 2019-07-15
Payer: MEDICARE

## 2019-07-15 DIAGNOSIS — I63.9 CRYPTOGENIC STROKE (HCC): ICD-10-CM

## 2019-07-15 DIAGNOSIS — Z95.818 STATUS POST PLACEMENT OF IMPLANTABLE LOOP RECORDER: Primary | ICD-10-CM

## 2019-07-15 PROCEDURE — 93298 REM INTERROG DEV EVAL SCRMS: CPT | Performed by: INTERNAL MEDICINE

## 2019-07-15 PROCEDURE — 93299 PR REM INTERROG ICPMS/SCRMS <30 D TECH REVIEW: CPT | Performed by: INTERNAL MEDICINE

## 2019-07-25 ENCOUNTER — TELEPHONE (OUTPATIENT)
Dept: NON INVASIVE DIAGNOSTICS | Age: 74
End: 2019-07-25

## 2019-08-16 ENCOUNTER — NURSE ONLY (OUTPATIENT)
Dept: NON INVASIVE DIAGNOSTICS | Age: 74
End: 2019-08-16
Payer: MEDICARE

## 2019-08-16 DIAGNOSIS — Z95.818 STATUS POST PLACEMENT OF IMPLANTABLE LOOP RECORDER: Primary | ICD-10-CM

## 2019-08-16 DIAGNOSIS — I63.9 CRYPTOGENIC STROKE (HCC): ICD-10-CM

## 2019-08-16 PROCEDURE — 93299 PR REM INTERROG ICPMS/SCRMS <30 D TECH REVIEW: CPT | Performed by: INTERNAL MEDICINE

## 2019-08-16 PROCEDURE — 93298 REM INTERROG DEV EVAL SCRMS: CPT | Performed by: INTERNAL MEDICINE

## 2019-08-20 ENCOUNTER — HOSPITAL ENCOUNTER (OUTPATIENT)
Dept: CT IMAGING | Age: 74
Discharge: HOME | End: 2019-08-22
Payer: MEDICARE

## 2019-08-20 DIAGNOSIS — F17.211 CIGARETTE NICOTINE DEPENDENCE IN REMISSION: ICD-10-CM

## 2019-08-20 PROCEDURE — G0297 LDCT FOR LUNG CA SCREEN: HCPCS

## 2019-08-27 ENCOUNTER — TELEPHONE (OUTPATIENT)
Dept: NON INVASIVE DIAGNOSTICS | Age: 74
End: 2019-08-27

## 2019-09-17 ENCOUNTER — NURSE ONLY (OUTPATIENT)
Dept: NON INVASIVE DIAGNOSTICS | Age: 74
End: 2019-09-17
Payer: MEDICARE

## 2019-09-17 DIAGNOSIS — Z95.818 STATUS POST PLACEMENT OF IMPLANTABLE LOOP RECORDER: Primary | ICD-10-CM

## 2019-09-17 DIAGNOSIS — I63.9 CRYPTOGENIC STROKE (HCC): ICD-10-CM

## 2019-09-17 PROCEDURE — 93298 REM INTERROG DEV EVAL SCRMS: CPT | Performed by: INTERNAL MEDICINE

## 2019-09-17 PROCEDURE — 93299 PR REM INTERROG ICPMS/SCRMS <30 D TECH REVIEW: CPT | Performed by: INTERNAL MEDICINE

## 2019-10-01 ENCOUNTER — TELEPHONE (OUTPATIENT)
Dept: NON INVASIVE DIAGNOSTICS | Age: 74
End: 2019-10-01

## 2019-10-21 ENCOUNTER — NURSE ONLY (OUTPATIENT)
Dept: NON INVASIVE DIAGNOSTICS | Age: 74
End: 2019-10-21
Payer: MEDICARE

## 2019-10-21 DIAGNOSIS — I63.9 CRYPTOGENIC STROKE (HCC): ICD-10-CM

## 2019-10-21 DIAGNOSIS — Z95.818 STATUS POST PLACEMENT OF IMPLANTABLE LOOP RECORDER: Primary | ICD-10-CM

## 2019-10-21 PROCEDURE — 93299 PR REM INTERROG ICPMS/SCRMS <30 D TECH REVIEW: CPT | Performed by: INTERNAL MEDICINE

## 2019-10-21 PROCEDURE — 93298 REM INTERROG DEV EVAL SCRMS: CPT | Performed by: INTERNAL MEDICINE

## 2019-11-04 ENCOUNTER — TELEPHONE (OUTPATIENT)
Dept: NON INVASIVE DIAGNOSTICS | Age: 74
End: 2019-11-04

## 2019-11-25 ENCOUNTER — NURSE ONLY (OUTPATIENT)
Dept: NON INVASIVE DIAGNOSTICS | Age: 74
End: 2019-11-25
Payer: MEDICARE

## 2019-11-25 DIAGNOSIS — Z95.818 STATUS POST PLACEMENT OF IMPLANTABLE LOOP RECORDER: Primary | ICD-10-CM

## 2019-11-25 DIAGNOSIS — I63.9 CRYPTOGENIC STROKE (HCC): ICD-10-CM

## 2019-11-25 PROCEDURE — 93299 PR REM INTERROG ICPMS/SCRMS <30 D TECH REVIEW: CPT | Performed by: INTERNAL MEDICINE

## 2019-11-25 PROCEDURE — 93298 REM INTERROG DEV EVAL SCRMS: CPT | Performed by: INTERNAL MEDICINE

## 2019-12-06 ENCOUNTER — TELEPHONE (OUTPATIENT)
Dept: NON INVASIVE DIAGNOSTICS | Age: 74
End: 2019-12-06

## 2019-12-26 ENCOUNTER — NURSE ONLY (OUTPATIENT)
Dept: NON INVASIVE DIAGNOSTICS | Age: 74
End: 2019-12-26
Payer: MEDICARE

## 2019-12-26 PROCEDURE — 93299 PR REM INTERROG ICPMS/SCRMS <30 D TECH REVIEW: CPT | Performed by: INTERNAL MEDICINE

## 2019-12-26 PROCEDURE — 93298 REM INTERROG DEV EVAL SCRMS: CPT | Performed by: INTERNAL MEDICINE

## 2020-01-08 PROBLEM — M72.0 DUPUYTREN'S DISEASE OF PALM OF LEFT HAND: Status: ACTIVE | Noted: 2020-01-08

## 2020-01-14 ENCOUNTER — TELEPHONE (OUTPATIENT)
Dept: NON INVASIVE DIAGNOSTICS | Age: 75
End: 2020-01-14

## 2020-01-27 ENCOUNTER — NURSE ONLY (OUTPATIENT)
Dept: NON INVASIVE DIAGNOSTICS | Age: 75
End: 2020-01-27
Payer: MEDICARE

## 2020-01-27 PROCEDURE — 93298 REM INTERROG DEV EVAL SCRMS: CPT | Performed by: INTERNAL MEDICINE

## 2020-02-04 NOTE — PROGRESS NOTES
See [employer] [place] report. Remote monitoring reviewed over a 30 day period. End of 30 day monitoring period date of service 1.27.2020.

## 2020-02-07 ENCOUNTER — TELEPHONE (OUTPATIENT)
Dept: NON INVASIVE DIAGNOSTICS | Age: 75
End: 2020-02-07

## 2020-03-02 ENCOUNTER — NURSE ONLY (OUTPATIENT)
Dept: NON INVASIVE DIAGNOSTICS | Age: 75
End: 2020-03-02
Payer: MEDICARE

## 2020-03-02 PROCEDURE — 93298 REM INTERROG DEV EVAL SCRMS: CPT | Performed by: INTERNAL MEDICINE

## 2020-03-09 ENCOUNTER — TELEPHONE (OUTPATIENT)
Dept: NON INVASIVE DIAGNOSTICS | Age: 75
End: 2020-03-09

## 2020-03-09 NOTE — TELEPHONE ENCOUNTER
----- Message from Antonio Tamayo RN sent at 3/5/2020  1:27 PM EST -----  Successful transmission received. Please call patient and give next appointment.

## 2020-04-06 ENCOUNTER — NURSE ONLY (OUTPATIENT)
Dept: NON INVASIVE DIAGNOSTICS | Age: 75
End: 2020-04-06
Payer: MEDICARE

## 2020-04-06 PROCEDURE — G2066 INTER DEVC REMOTE 30D: HCPCS | Performed by: INTERNAL MEDICINE

## 2020-04-06 PROCEDURE — 93298 REM INTERROG DEV EVAL SCRMS: CPT | Performed by: INTERNAL MEDICINE

## 2020-05-07 ENCOUNTER — NURSE ONLY (OUTPATIENT)
Dept: NON INVASIVE DIAGNOSTICS | Age: 75
End: 2020-05-07
Payer: MEDICARE

## 2020-05-07 PROCEDURE — 93298 REM INTERROG DEV EVAL SCRMS: CPT | Performed by: INTERNAL MEDICINE

## 2020-05-07 PROCEDURE — G2066 INTER DEVC REMOTE 30D: HCPCS | Performed by: INTERNAL MEDICINE

## 2020-05-08 ENCOUNTER — HOSPITAL ENCOUNTER (OUTPATIENT)
Dept: CARDIOLOGY | Age: 75
Discharge: HOME | End: 2020-05-08
Payer: MEDICARE

## 2020-05-08 PROCEDURE — 93880 EXTRACRANIAL BILAT STUDY: CPT

## 2020-05-27 PROBLEM — F51.01 PRIMARY INSOMNIA: Status: ACTIVE | Noted: 2020-05-27

## 2020-05-27 PROBLEM — I65.23 CAROTID ATHEROSCLEROSIS, BILATERAL: Status: ACTIVE | Noted: 2020-05-27

## 2020-06-01 ENCOUNTER — HOSPITAL ENCOUNTER (OUTPATIENT)
Age: 75
Discharge: HOME | End: 2020-06-03
Payer: MEDICARE

## 2020-06-01 PROCEDURE — 84443 ASSAY THYROID STIM HORMONE: CPT

## 2020-06-01 PROCEDURE — 85025 COMPLETE CBC W/AUTO DIFF WBC: CPT

## 2020-06-01 PROCEDURE — G0103 PSA SCREENING: HCPCS

## 2020-06-01 PROCEDURE — 80053 COMPREHEN METABOLIC PANEL: CPT

## 2020-06-01 PROCEDURE — 80061 LIPID PANEL: CPT

## 2020-06-01 PROCEDURE — 83036 HEMOGLOBIN GLYCOSYLATED A1C: CPT

## 2020-06-02 LAB
ALBUMIN SERPL-MCNC: 4 G/DL (ref 3.5–5.2)
ALP BLD-CCNC: 99 U/L (ref 40–129)
ALT SERPL-CCNC: 28 U/L (ref 0–40)
ANION GAP SERPL CALCULATED.3IONS-SCNC: 15 MMOL/L (ref 7–16)
AST SERPL-CCNC: 37 U/L (ref 0–39)
BASOPHILS ABSOLUTE: 0.04 E9/L (ref 0–0.2)
BASOPHILS RELATIVE PERCENT: 0.6 % (ref 0–2)
BILIRUB SERPL-MCNC: 1.2 MG/DL (ref 0–1.2)
BUN BLDV-MCNC: 14 MG/DL (ref 8–23)
CALCIUM SERPL-MCNC: 9.8 MG/DL (ref 8.6–10.2)
CHLORIDE BLD-SCNC: 103 MMOL/L (ref 98–107)
CHOLESTEROL, TOTAL: 171 MG/DL (ref 0–199)
CO2: 23 MMOL/L (ref 22–29)
CREAT SERPL-MCNC: 1.1 MG/DL (ref 0.7–1.2)
EOSINOPHILS ABSOLUTE: 0.41 E9/L (ref 0.05–0.5)
EOSINOPHILS RELATIVE PERCENT: 5.8 % (ref 0–6)
GFR AFRICAN AMERICAN: >60
GFR NON-AFRICAN AMERICAN: >60 ML/MIN/1.73
GLUCOSE FASTING: 103 MG/DL (ref 74–99)
HBA1C MFR BLD: 6.9 % (ref 4–5.6)
HCT VFR BLD CALC: 46.3 % (ref 37–54)
HDLC SERPL-MCNC: 36 MG/DL
HEMOGLOBIN: 14.7 G/DL (ref 12.5–16.5)
IMMATURE GRANULOCYTES #: 0.02 E9/L
IMMATURE GRANULOCYTES %: 0.3 % (ref 0–5)
LDL CHOLESTEROL CALCULATED: 107 MG/DL (ref 0–99)
LYMPHOCYTES ABSOLUTE: 2.38 E9/L (ref 1.5–4)
LYMPHOCYTES RELATIVE PERCENT: 33.5 % (ref 20–42)
MCH RBC QN AUTO: 30.9 PG (ref 26–35)
MCHC RBC AUTO-ENTMCNC: 31.7 % (ref 32–34.5)
MCV RBC AUTO: 97.3 FL (ref 80–99.9)
MONOCYTES ABSOLUTE: 0.88 E9/L (ref 0.1–0.95)
MONOCYTES RELATIVE PERCENT: 12.4 % (ref 2–12)
NEUTROPHILS ABSOLUTE: 3.37 E9/L (ref 1.8–7.3)
NEUTROPHILS RELATIVE PERCENT: 47.4 % (ref 43–80)
PDW BLD-RTO: 14.9 FL (ref 11.5–15)
PLATELET # BLD: 199 E9/L (ref 130–450)
PMV BLD AUTO: 12.6 FL (ref 7–12)
POTASSIUM SERPL-SCNC: 4.9 MMOL/L (ref 3.5–5)
PROSTATE SPECIFIC ANTIGEN: 1.81 NG/ML (ref 0–4)
RBC # BLD: 4.76 E12/L (ref 3.8–5.8)
SODIUM BLD-SCNC: 141 MMOL/L (ref 132–146)
TOTAL PROTEIN: 7.2 G/DL (ref 6.4–8.3)
TRIGL SERPL-MCNC: 140 MG/DL (ref 0–149)
TSH SERPL DL<=0.05 MIU/L-ACNC: 2.37 UIU/ML (ref 0.27–4.2)
VLDLC SERPL CALC-MCNC: 28 MG/DL
WBC # BLD: 7.1 E9/L (ref 4.5–11.5)

## 2020-06-09 ENCOUNTER — TELEPHONE (OUTPATIENT)
Dept: NON INVASIVE DIAGNOSTICS | Age: 75
End: 2020-06-09

## 2020-06-09 NOTE — TELEPHONE ENCOUNTER
Spoke with patient, he will have his daughter call back regarding scheduling telephone or virtual visit.

## 2020-06-15 ENCOUNTER — VIRTUAL VISIT (OUTPATIENT)
Dept: VASCULAR SURGERY | Age: 75
End: 2020-06-15
Payer: MEDICARE

## 2020-06-15 PROCEDURE — 99213 OFFICE O/P EST LOW 20 MIN: CPT | Performed by: SURGERY

## 2020-06-15 NOTE — PROGRESS NOTES
Current Medications:   Current Outpatient Medications   Medication Sig Dispense Refill    XIAFLEX 0.9 MG SOLR injection       traMADol (ULTRAM) 50 MG tablet Take 1 tablet by mouth every 8 hours as needed for Pain for up to 30 days. 90 tablet 0    zolpidem (AMBIEN) 10 MG tablet Take 1 tablet by mouth every evening for 30 days. 30 tablet 0    gabapentin (NEURONTIN) 300 MG capsule TAKE 1 CAPSULE BY MOUTH TWICE DAILY 60 capsule 1    metoprolol tartrate (LOPRESSOR) 25 MG tablet TAKE 1/2 TABLET BY MOUTH TWICE DAILY 90 tablet 3    atorvastatin (LIPITOR) 40 MG tablet       clopidogrel (PLAVIX) 75 MG tablet Take 1 tablet by mouth daily 30 tablet 3    pantoprazole (PROTONIX) 40 MG tablet TK 1 T PO QD  3     No current facility-administered medications for this visit. Allergies:  Patient has no known allergies.   Social History     Socioeconomic History    Marital status:      Spouse name: Not on file    Number of children: Not on file    Years of education: Not on file    Highest education level: Not on file   Occupational History    Occupation: - retired   Social Needs    Financial resource strain: Not on file    Food insecurity     Worry: Not on file     Inability: Not on file   [employer] needs     Medical: Not on file     Non-medical: Not on file   Tobacco Use    Smoking status: Former Smoker     Packs/day: 1.00     Years: 56.00     Pack years: 56.00     Types: Cigarettes     Start date:      Last attempt to quit: 2016     Years since quittin.1    Smokeless tobacco: Never Used   Substance and Sexual Activity    Alcohol use: No    Drug use: No    Sexual activity: Not Currently   Lifestyle    Physical activity     Days per week: Not on file     Minutes per session: Not on file    Stress: Not on file   Relationships    Social connections     Talks on phone: Not on file     Gets together: Not on file     Attends Hindu service: Not on file     Active

## 2020-06-16 NOTE — PATIENT INSTRUCTIONS
Patient Education        Carotid Stenosis: Care Instructions  Your Care Instructions     Carotid stenosis is narrowing of one or both of the carotid arteries. These arteries take blood from the heart to the brain. There is one on each side of the neck. A substance called plaque builds up inside an artery. This makes it too narrow. Plaque comes from damage to the artery over time. This damage may be caused by high blood pressure, high cholesterol, diabetes, or smoking. Sometimes plaque can break loose from the carotid artery and move to the brain. This can cause a stroke or transient ischemic attack (TIA). The goal of treatment is to lower your risk of having a stroke or TIA. You can lower your risk by making healthy lifestyle changes and taking medicine. Sometimes a surgery or procedure is done. Follow-up care is a key part of your treatment and safety. Be sure to make and go to all appointments, and call your doctor if you are having problems. It's also a good idea to know your test results and keep a list of the medicines you take. How can you care for yourself at home? · Take your medicines exactly as prescribed. Call your doctor if you think you are having a problem with your medicine. You may take medicine to lower your blood pressure, to lower your cholesterol, or to prevent blood clots. · If you take a blood thinner, such as aspirin, be sure to get instructions about how to take your medicine safely. Blood thinners can cause serious bleeding problems. · Do not smoke. People who smoke have a higher chance of stroke than those who quit. If you need help quitting, talk to your doctor about stop-smoking programs and medicines. These can increase your chances of quitting for good. · Eat a healthy diet that is low in saturated fat and salt. Eat lots of fresh fruits and vegetables and foods high in fiber. · Stay at a healthy weight. Lose weight if you need to.   · Talk to your doctor about starting an

## 2020-07-17 ENCOUNTER — HOSPITAL ENCOUNTER (EMERGENCY)
Age: 75
Discharge: HOME | End: 2020-07-17
Attending: EMERGENCY MEDICINE
Payer: MEDICARE

## 2020-07-17 ENCOUNTER — APPOINTMENT (OUTPATIENT)
Dept: CT IMAGING | Age: 75
End: 2020-07-17
Payer: MEDICARE

## 2020-07-17 ENCOUNTER — APPOINTMENT (OUTPATIENT)
Dept: GENERAL RADIOLOGY | Age: 75
End: 2020-07-17
Payer: MEDICARE

## 2020-07-17 VITALS
WEIGHT: 137 LBS | TEMPERATURE: 97.6 F | SYSTOLIC BLOOD PRESSURE: 137 MMHG | HEART RATE: 68 BPM | RESPIRATION RATE: 14 BRPM | DIASTOLIC BLOOD PRESSURE: 61 MMHG | OXYGEN SATURATION: 99 % | HEIGHT: 66 IN | BODY MASS INDEX: 22.02 KG/M2

## 2020-07-17 LAB
ALBUMIN SERPL-MCNC: 3.9 G/DL (ref 3.5–5.2)
ALP BLD-CCNC: 116 U/L (ref 40–129)
ALT SERPL-CCNC: 24 U/L (ref 0–40)
ANION GAP SERPL CALCULATED.3IONS-SCNC: 11 MMOL/L (ref 7–16)
APTT: 30.2 SEC (ref 24.5–35.1)
AST SERPL-CCNC: 20 U/L (ref 0–39)
BACTERIA: NORMAL /HPF
BASOPHILS ABSOLUTE: 0.04 E9/L (ref 0–0.2)
BASOPHILS RELATIVE PERCENT: 0.4 % (ref 0–2)
BILIRUB SERPL-MCNC: 0.8 MG/DL (ref 0–1.2)
BILIRUBIN URINE: NEGATIVE
BLOOD, URINE: NORMAL
BUN BLDV-MCNC: 16 MG/DL (ref 8–23)
CALCIUM SERPL-MCNC: 9.6 MG/DL (ref 8.6–10.2)
CHLORIDE BLD-SCNC: 103 MMOL/L (ref 98–107)
CLARITY: CLEAR
CO2: 27 MMOL/L (ref 22–29)
COLOR: YELLOW
CREAT SERPL-MCNC: 1 MG/DL (ref 0.7–1.2)
EKG ATRIAL RATE: 57 BPM
EKG P AXIS: 63 DEGREES
EKG P-R INTERVAL: 140 MS
EKG Q-T INTERVAL: 416 MS
EKG QRS DURATION: 88 MS
EKG QTC CALCULATION (BAZETT): 404 MS
EKG R AXIS: 21 DEGREES
EKG T AXIS: 47 DEGREES
EKG VENTRICULAR RATE: 57 BPM
EOSINOPHILS ABSOLUTE: 0.2 E9/L (ref 0.05–0.5)
EOSINOPHILS RELATIVE PERCENT: 1.9 % (ref 0–6)
GFR AFRICAN AMERICAN: >60
GFR NON-AFRICAN AMERICAN: >60 ML/MIN/1.73
GLUCOSE BLD-MCNC: 98 MG/DL (ref 74–99)
GLUCOSE URINE: NEGATIVE MG/DL
HCT VFR BLD CALC: 44.3 % (ref 37–54)
HEMOGLOBIN: 14.4 G/DL (ref 12.5–16.5)
IMMATURE GRANULOCYTES #: 0.03 E9/L
IMMATURE GRANULOCYTES %: 0.3 % (ref 0–5)
INR BLD: 1
KETONES, URINE: NEGATIVE MG/DL
LACTIC ACID: 1.3 MMOL/L (ref 0.5–2.2)
LEUKOCYTE ESTERASE, URINE: NEGATIVE
LIPASE: 22 U/L (ref 13–60)
LYMPHOCYTES ABSOLUTE: 1.26 E9/L (ref 1.5–4)
LYMPHOCYTES RELATIVE PERCENT: 12.1 % (ref 20–42)
MCH RBC QN AUTO: 30.8 PG (ref 26–35)
MCHC RBC AUTO-ENTMCNC: 32.5 % (ref 32–34.5)
MCV RBC AUTO: 94.7 FL (ref 80–99.9)
MONOCYTES ABSOLUTE: 0.92 E9/L (ref 0.1–0.95)
MONOCYTES RELATIVE PERCENT: 8.8 % (ref 2–12)
NEUTROPHILS ABSOLUTE: 7.99 E9/L (ref 1.8–7.3)
NEUTROPHILS RELATIVE PERCENT: 76.5 % (ref 43–80)
NITRITE, URINE: NEGATIVE
PDW BLD-RTO: 14.5 FL (ref 11.5–15)
PH UA: 6 (ref 5–9)
PLATELET # BLD: 211 E9/L (ref 130–450)
PMV BLD AUTO: 12 FL (ref 7–12)
POTASSIUM REFLEX MAGNESIUM: 4.1 MMOL/L (ref 3.5–5)
PROTEIN UA: NEGATIVE MG/DL
PROTHROMBIN TIME: 11.8 SEC (ref 9.3–12.4)
RBC # BLD: 4.68 E12/L (ref 3.8–5.8)
RBC UA: NORMAL /HPF (ref 0–2)
SODIUM BLD-SCNC: 141 MMOL/L (ref 132–146)
SPECIFIC GRAVITY UA: 1.01 (ref 1–1.03)
TOTAL PROTEIN: 7.7 G/DL (ref 6.4–8.3)
TROPONIN: <0.01 NG/ML (ref 0–0.03)
UROBILINOGEN, URINE: 0.2 E.U./DL
WBC # BLD: 10.4 E9/L (ref 4.5–11.5)
WBC UA: NORMAL /HPF (ref 0–5)

## 2020-07-17 PROCEDURE — 6360000004 HC RX CONTRAST MEDICATION: Performed by: RADIOLOGY

## 2020-07-17 PROCEDURE — 71045 X-RAY EXAM CHEST 1 VIEW: CPT

## 2020-07-17 PROCEDURE — 6360000002 HC RX W HCPCS: Performed by: STUDENT IN AN ORGANIZED HEALTH CARE EDUCATION/TRAINING PROGRAM

## 2020-07-17 PROCEDURE — 74177 CT ABD & PELVIS W/CONTRAST: CPT

## 2020-07-17 PROCEDURE — 85730 THROMBOPLASTIN TIME PARTIAL: CPT

## 2020-07-17 PROCEDURE — 2580000003 HC RX 258: Performed by: RADIOLOGY

## 2020-07-17 PROCEDURE — 85610 PROTHROMBIN TIME: CPT

## 2020-07-17 PROCEDURE — 80053 COMPREHEN METABOLIC PANEL: CPT

## 2020-07-17 PROCEDURE — 83690 ASSAY OF LIPASE: CPT

## 2020-07-17 PROCEDURE — 93005 ELECTROCARDIOGRAM TRACING: CPT | Performed by: STUDENT IN AN ORGANIZED HEALTH CARE EDUCATION/TRAINING PROGRAM

## 2020-07-17 PROCEDURE — 85025 COMPLETE CBC W/AUTO DIFF WBC: CPT

## 2020-07-17 PROCEDURE — 99284 EMERGENCY DEPT VISIT MOD MDM: CPT

## 2020-07-17 PROCEDURE — 81001 URINALYSIS AUTO W/SCOPE: CPT

## 2020-07-17 PROCEDURE — 83605 ASSAY OF LACTIC ACID: CPT

## 2020-07-17 PROCEDURE — 96374 THER/PROPH/DIAG INJ IV PUSH: CPT

## 2020-07-17 PROCEDURE — 84484 ASSAY OF TROPONIN QUANT: CPT

## 2020-07-17 RX ORDER — SODIUM CHLORIDE 0.9 % (FLUSH) 0.9 %
10 SYRINGE (ML) INJECTION PRN
Status: COMPLETED | OUTPATIENT
Start: 2020-07-17 | End: 2020-07-17

## 2020-07-17 RX ORDER — POLYETHYLENE GLYCOL 3350 17 G/17G
17 POWDER, FOR SOLUTION ORAL DAILY PRN
Qty: 510 G | Refills: 0 | Status: SHIPPED | OUTPATIENT
Start: 2020-07-17 | End: 2020-08-16

## 2020-07-17 RX ORDER — FENTANYL CITRATE 50 UG/ML
50 INJECTION, SOLUTION INTRAMUSCULAR; INTRAVENOUS ONCE
Status: COMPLETED | OUTPATIENT
Start: 2020-07-17 | End: 2020-07-17

## 2020-07-17 RX ADMIN — FENTANYL CITRATE 50 MCG: 50 INJECTION, SOLUTION INTRAMUSCULAR; INTRAVENOUS at 12:46

## 2020-07-17 RX ADMIN — IOPAMIDOL 110 ML: 755 INJECTION, SOLUTION INTRAVENOUS at 13:29

## 2020-07-17 RX ADMIN — Medication 10 ML: at 13:27

## 2020-07-17 ASSESSMENT — ENCOUNTER SYMPTOMS
VOMITING: 0
EYE PAIN: 0
SHORTNESS OF BREATH: 0
SORE THROAT: 0
NAUSEA: 1
RHINORRHEA: 0
BACK PAIN: 0
PHOTOPHOBIA: 0
ABDOMINAL PAIN: 1
COUGH: 0
CONSTIPATION: 1
CHEST TIGHTNESS: 0
WHEEZING: 0
TROUBLE SWALLOWING: 0
DIARRHEA: 1
EYE REDNESS: 0
APNEA: 0

## 2020-07-17 ASSESSMENT — PAIN SCALES - GENERAL
PAINLEVEL_OUTOF10: 8
PAINLEVEL_OUTOF10: 9
PAINLEVEL_OUTOF10: 5

## 2020-07-17 ASSESSMENT — PAIN DESCRIPTION - PAIN TYPE
TYPE: ACUTE PAIN
TYPE: ACUTE PAIN

## 2020-07-17 ASSESSMENT — PAIN DESCRIPTION - DESCRIPTORS: DESCRIPTORS: ACHING

## 2020-07-17 ASSESSMENT — PAIN DESCRIPTION - LOCATION: LOCATION: ABDOMEN

## 2020-07-17 NOTE — ED PROVIDER NOTES
[address] [facility] ENCOUNTER      Pt Name: Farideh Mota  MRN: [id number]  [place] 1945  Date of evaluation: 7/17/2020      CHIEF COMPLAINT       Chief Complaint   Patient presents with    Constipation     states he has been constipated and having rectal pain x 2 days; hx of hemorrhoids many years ago; started taking Colace at home with minimal results    Abdominal Pain     LLQ abd pain started yesterday         HPI  Farideh Mota is a 66 y.o. male with history of BPH, coronary artery disease, pacemaker placement, hypertension, hyperlipidemia, status post CABG, multiple abdominal surgeries in the 1970s after a ulcer, who presents to the emergency department with complaints of constipation as well as left lower quadrant abdominal pain. The patient states that 3 days ago he began to have gradually worsening left lower quadrant abdominal pain. Describes as achy nonradiating. Currently the pain is moderate. The pain does not radiate. He notes has had increased problems with constipation. He states he is only having small amounts of diarrhea for the last day or 2. He denies any black or bloody stools. He denies any fevers, chills, chest pain or shortness of breath. He does describe vague nausea. He denies any vomiting. He denies any back pain, tingling, numbness or weakness. He is somewhat poor historian and says he does have a tumor somewhere in his back. He denies any saddle paresthesia or back pain. Denies any injury or trauma. He denies urinary retention or urinary or fecal incontinence. Except as noted above the remainder of the review of systems was reviewed and negative. Review of Systems   Constitutional: Positive for fatigue. Negative for activity change, chills, diaphoresis and fever. HENT: Negative for rhinorrhea, sore throat and trouble swallowing.     Eyes: Negative for photophobia, pain and redness. Respiratory: Negative for apnea, cough, chest tightness, shortness of breath and wheezing. Cardiovascular: Negative for chest pain, palpitations and leg swelling. Gastrointestinal: Positive for abdominal pain, constipation, diarrhea and nausea. Negative for vomiting. Endocrine: Negative for polyuria. Genitourinary: Negative for difficulty urinating and dysuria. Musculoskeletal: Negative for back pain, neck pain and neck stiffness. Skin: Negative for pallor and rash. Neurological: Negative for dizziness, syncope, weakness, light-headedness and numbness. Psychiatric/Behavioral: Negative for confusion. The patient is not nervous/anxious. Physical Exam  Vitals signs and nursing note reviewed. Constitutional:       General: He is not in acute distress. Appearance: He is well-developed. He is not ill-appearing or toxic-appearing. Comments: Awake and alert. Sitting in the gurney in no obvious distress. HENT:      Head: Normocephalic and atraumatic. Mouth/Throat:      Mouth: Mucous membranes are moist.      Pharynx: No oropharyngeal exudate. Eyes:      General: No scleral icterus. Pupils: Pupils are equal, round, and reactive to light. Neck:      Musculoskeletal: Normal range of motion and neck supple. Cardiovascular:      Rate and Rhythm: Normal rate and regular rhythm. Heart sounds: Normal heart sounds. No murmur. Comments: 2+ radial and dorsal pedis pulses bilaterally. Pulmonary:      Effort: Pulmonary effort is normal. No respiratory distress. Breath sounds: Normal breath sounds. No wheezing. Abdominal:      General: There is distension. Palpations: Abdomen is soft. There is no mass. Tenderness: There is abdominal tenderness. There is no right CVA tenderness, left CVA tenderness, guarding or rebound. Comments: Left lower quadrant abdominal tenderness with mild abdominal distention. Abdomen is otherwise soft.   No peritoneal signs.   Genitourinary:     Comments: Rectal exam shows guaiac negative stool, no clear impaction, no pain. No hemorrhoid or fissure. Normal rectal tone. Musculoskeletal: Normal range of motion. General: No tenderness or deformity. Right lower leg: No edema. Left lower leg: No edema. Skin:     General: Skin is warm and dry. Capillary Refill: Capillary refill takes less than 2 seconds. Coloration: Skin is not jaundiced or pale. Findings: No rash. Neurological:      General: No focal deficit present. Mental Status: He is alert and oriented to person, place, and time. Cranial Nerves: No cranial nerve deficit. Sensory: No sensory deficit. Motor: No weakness or abnormal muscle tone. Comments: No saddle paresthesia. Normal strength and sensation to the lower extremities bilaterally. Psychiatric:         Mood and Affect: Mood normal.         Behavior: Behavior normal.          Procedures     MDM   This is a 61-year-old male with a history of previous CABG, hyperlipidemia, hypertension, previous abdominal surgery for peptic ulcer disease who presents to the emergency department with constipation and left lower quadrant abdominal pain. In the emergency department he is awake and alert, hemodynamic stable, afebrile, no respiratory distress. He does have mild abdominal distention and left lower quadrant abdominal pain. Rectal exam showed guaiac negative stool no clear impaction. Concern for possible bowel obstruction versus diverticulitis. CT with IV contrast was obtained that showed no evidence of obstruction but did show significant constipation in the sigmoid colon. A suds enema was then placed and the patient had a very large bowel movement and felt immediate relief in all of his symptoms. He remained hemodynamically stable. Lab work was also reassuring. Normal electrolytes, normal renal function on metabolic panel.   No leukocytosis and lactic acid was also reassuring at 1.3. EKG showed no ischemic changes troponin was negative and less likely acute cardiac etiology of his symptoms today. Patient had resolution of symptoms after the bowel movement. Discussed plan for discharge home with close outpatient follow-up with a PCP. Referred him to a general surgeon for further evaluation for his constipation. Discussed return precautions and the patient understands and agrees the plan.                    --------------------------------------------- PAST HISTORY ---------------------------------------------  Past Medical History:  has a past medical history of Anxiety, Asymptomatic bilateral carotid artery stenosis, Bowel obstruction ([facility] CAD (coronary artery disease), COPD (chronic obstructive pulmonary disease) ([facility] Cryptogenic stroke ([facility] and NSTEMI (non-ST elevated myocardial infarction) ([facility] Past Surgical History:  has a past surgical history that includes colectomy; Cardiac catheterization (4/26/2016); Coronary artery bypass graft (4/27/16); Diagnostic Cardiac Cath Lab Procedure (04/26/2016); Insertable Cardiac Monitor (Left, 10/11/2016); and back surgery. Social History:  reports that he quit smoking about 4 years ago. His smoking use included cigarettes. He started smoking about 60 years ago. He has a 56.00 pack-year smoking history. He has never used smokeless tobacco. He reports that he does not drink alcohol or use drugs. Family History: family history includes Heart Attack in his father; Heart Surgery in his brother and mother; Hypertension in his mother. The patients home medications have been reviewed. Allergies: Patient has no known allergies.     -------------------------------------------------- RESULTS -------------------------------------------------  Labs:  Results for orders placed or performed during the hospital encounter of 07/17/20   Protime-INR   Result Value Ref Range    Protime 11.8 9.3 - 12.4 sec    INR 1. 0    CBC Auto Differential   Result Value Ref Range    WBC 10.4 4.5 - 11.5 E9/L    RBC 4.68 3.80 - 5.80 E12/L    Hemoglobin 14.4 12.5 - 16.5 g/dL    Hematocrit 44.3 37.0 - 54.0 %    MCV 94.7 80.0 - 99.9 fL    MCH 30.8 26.0 - 35.0 pg    MCHC 32.5 32.0 - 34.5 %    RDW 14.5 11.5 - 15.0 fL    Platelets 224 406 - 112 E9/L    MPV 12.0 7.0 - 12.0 fL    Neutrophils % 76.5 43.0 - 80.0 %    Immature Granulocytes % 0.3 0.0 - 5.0 %    Lymphocytes % 12.1 (L) 20.0 - 42.0 %    Monocytes % 8.8 2.0 - 12.0 %    Eosinophils % 1.9 0.0 - 6.0 %    Basophils % 0.4 0.0 - 2.0 %    Neutrophils Absolute 7.99 (H) 1.80 - 7.30 E9/L    Immature Granulocytes # 0.03 E9/L    Lymphocytes Absolute 1.26 (L) 1.50 - 4.00 E9/L    Monocytes Absolute 0.92 0.10 - 0.95 E9/L    Eosinophils Absolute 0.20 0.05 - 0.50 E9/L    Basophils Absolute 0.04 0.00 - 0.20 E9/L   Comprehensive Metabolic Panel w/ Reflex to MG   Result Value Ref Range    Sodium 141 132 - 146 mmol/L    Potassium reflex Magnesium 4.1 3.5 - 5.0 mmol/L    Chloride 103 98 - 107 mmol/L    CO2 27 22 - 29 mmol/L    Anion Gap 11 7 - 16 mmol/L    Glucose 98 74 - 99 mg/dL    BUN 16 8 - 23 mg/dL    CREATININE 1.0 0.7 - 1.2 mg/dL    GFR Non-African American >60 >=60 mL/min/1.73    GFR African American >60     Calcium 9.6 8.6 - 10.2 mg/dL    Total Protein 7.7 6.4 - 8.3 g/dL    Alb 3.9 3.5 - 5.2 g/dL    Total Bilirubin 0.8 0.0 - 1.2 mg/dL    Alkaline Phosphatase 116 40 - 129 U/L    ALT 24 0 - 40 U/L    AST 20 0 - 39 U/L   Lipase   Result Value Ref Range    Lipase 22 13 - 60 U/L   Troponin   Result Value Ref Range    Troponin <0.01 0.00 - 0.03 ng/mL   APTT   Result Value Ref Range    aPTT 30.2 24.5 - 35.1 sec   Urinalysis, reflex to microscopic   Result Value Ref Range    Color, UA Yellow Straw/Yellow    Clarity, UA Clear Clear    Glucose, Ur Negative Negative mg/dL    Bilirubin Urine Negative Negative    Ketones, Urine Negative Negative mg/dL    Specific Gravity, UA 1.010 1.005 - 1.030    Blood, Urine to providing specific details for the plan of care and counseling regarding the diagnosis and prognosis. Their questions are answered at this time and they are agreeable with the plan. I discussed at length with them reasons for immediate return here for re evaluation. They will followup with their primary care physician by calling their office tomorrow. --------------------------------- ADDITIONAL PROVIDER NOTES ---------------------------------  At this time the patient is without objective evidence of an acute process requiring hospitalization or inpatient management. They have remained hemodynamically stable throughout their entire ED visit and are stable for discharge with outpatient follow-up. The plan has been discussed in detail and they are aware of the specific conditions for emergent return, as well as the importance of follow-up. Discharge Medication List as of 7/17/2020  3:32 PM      START taking these medications    Details   polyethylene glycol (GLYCOLAX) 17 GM/SCOOP powder Take 17 g by mouth daily as needed (constipation), Disp-510 g,R-0Print             Diagnosis:  1. Constipation, unspecified constipation type    2. Abdominal pain, left lower quadrant        Disposition:  Patient's disposition: Discharge to home  Patient's condition is stable.        Bobette Dubin, DO  Resident  07/17/20 2011

## 2020-07-18 ENCOUNTER — CARE COORDINATION (OUTPATIENT)
Dept: CARE COORDINATION | Age: 75
End: 2020-07-18

## 2020-07-18 NOTE — CARE COORDINATION
Patient contacted regarding recent discharge and COVID-19 risk. Discussed COVID-19 related testing which was not done at this time. Test results were not done. Patient informed of results, if available? No     Care Transition Nurse/ Ambulatory Care Manager contacted the patient by telephone to perform post discharge assessment. Verified name and  with patient as identifiers. Patient has following risk factors of: no known risk factors. CTN/ACM reviewed discharge instructions, medical action plan and red flags related to discharge diagnosis. Reviewed and educated them on any new and changed medications related to discharge diagnosis. Advised obtaining a 90-day supply of all daily and as-needed medications. Education provided regarding infection prevention, and signs and symptoms of COVID-19 and when to seek medical attention with patient who verbalized understanding. Discussed exposure protocols and quarantine from [address] you at higher risk for severe illness  and given an opportunity for questions and concerns. The patient agrees to contact the COVID-19 hotline [phone] or PCP office for questions related to their healthcare. CTN/ACM provided contact information for future reference. From CDC: Are you at higher risk for severe illness?  Wash your hands often.  Avoid close contact (6 feet, which is about two arm lengths) with people who are sick.  Put distance between yourself and other people if COVID-19 is spreading in your community.  Clean and disinfect frequently touched surfaces.  Avoid all cruise travel and non-essential air travel.  Call your healthcare professional if you have concerns about COVID-19 and your underlying condition or if you are sick. For more information on steps you can take to protect yourself, see CDC's How to [address] [place] [address] for follow-up call in 7-14 days based on severity of symptoms and risk factors.     Patient said he has not yet had a BM, but is passing a lot of gas and \"hears movement. \" Pt said he will contact PCP Monday if no BM before them. Pt has obtained glycolax and is taking as directed. Denies questions/cocnerns. Frequent hand washing, social distancing, and wearing of mask reviewed, understanding verbalized. Pt stated \"I don't go anywhere anyway. \" Emergency decision maker verified.

## 2020-07-30 ENCOUNTER — HOSPITAL ENCOUNTER (OUTPATIENT)
Dept: GENERAL RADIOLOGY | Age: 75
Discharge: HOME | End: 2020-08-01
Payer: MEDICARE

## 2020-07-30 ENCOUNTER — HOSPITAL ENCOUNTER (OUTPATIENT)
Dept: ULTRASOUND IMAGING | Age: 75
Discharge: HOME | End: 2020-08-01
Payer: MEDICARE

## 2020-07-30 PROCEDURE — 6360000004 HC RX CONTRAST MEDICATION: Performed by: RADIOLOGY

## 2020-07-30 PROCEDURE — 2500000003 HC RX 250 WO HCPCS: Performed by: RADIOLOGY

## 2020-07-30 PROCEDURE — 74220 X-RAY XM ESOPHAGUS 1CNTRST: CPT

## 2020-07-30 PROCEDURE — 76536 US EXAM OF HEAD AND NECK: CPT

## 2020-07-30 PROCEDURE — A4641 RADIOPHARM DX AGENT NOC: HCPCS | Performed by: RADIOLOGY

## 2020-07-30 RX ADMIN — BARIUM SULFATE 140 ML: 980 POWDER, FOR SUSPENSION ORAL at 11:27

## 2020-07-30 RX ADMIN — BARIUM SULFATE 176 G: 960 POWDER, FOR SUSPENSION ORAL at 11:26

## 2020-07-30 RX ADMIN — BARIUM SULFATE 1 TABLET: 700 TABLET ORAL at 11:26

## 2020-07-31 ENCOUNTER — OFFICE VISIT (OUTPATIENT)
Dept: CARDIOLOGY CLINIC | Age: 75
End: 2020-07-31
Payer: MEDICARE

## 2020-07-31 ENCOUNTER — TELEPHONE (OUTPATIENT)
Dept: NON INVASIVE DIAGNOSTICS | Age: 75
End: 2020-07-31

## 2020-07-31 VITALS
HEIGHT: 66 IN | SYSTOLIC BLOOD PRESSURE: 150 MMHG | WEIGHT: 140.4 LBS | HEART RATE: 51 BPM | DIASTOLIC BLOOD PRESSURE: 65 MMHG | BODY MASS INDEX: 22.56 KG/M2

## 2020-07-31 PROCEDURE — 99214 OFFICE O/P EST MOD 30 MIN: CPT | Performed by: INTERNAL MEDICINE

## 2020-07-31 PROCEDURE — 93000 ELECTROCARDIOGRAM COMPLETE: CPT | Performed by: INTERNAL MEDICINE

## 2020-07-31 RX ORDER — LOSARTAN POTASSIUM 25 MG/1
25 TABLET ORAL DAILY
Qty: 90 TABLET | Refills: 3 | Status: SHIPPED
Start: 2020-07-31 | End: 2021-08-25

## 2020-07-31 NOTE — TELEPHONE ENCOUNTER
Patient wanted to schedule next appt. In last note from 6/18/20 VV, it does not specify when to f/u.

## 2020-07-31 NOTE — PROGRESS NOTES
Sandra David  1945  Date of Service: 2020    Patient Active Problem List    Diagnosis Date Noted    Primary insomnia 2020    Carotid atherosclerosis, bilateral 2020    Dupuytren's disease of palm of left hand 2020    Prediabetes 2019    Benign prostatic hyperplasia without lower urinary tract symptoms 2019    Cryptogenic stroke ([address] 2017    Lumbar disc disease 2017    Status post placement of implantable loop recorder 10/25/2016    PFO (patent foramen ovale) 10/11/2016    Hx of CABG 10/10/2016    Essential hypertension 10/10/2016    Mixed hyperlipidemia 10/10/2016    Carotid artery stenosis, asymptomatic, bilateral 2016    Coronary artery disease involving native coronary artery of native heart without angina pectoris 2016     Overview Note:     4-16 LM stenosis  CABG  LIMA - LAD  SVG - OM  SVG - Ramus         Social History     Socioeconomic History    Marital status:      Spouse name: None    Number of children: None    Years of education: None    Highest education level: None   Occupational History    Occupation: - retired   Social Needs    Financial resource strain: None    Food insecurity     Worry: None     Inability: None    Transportation needs     Medical: None     Non-medical: None   Tobacco Use    Smoking status: Former Smoker     Packs/day: 1.00     Years: 56.00     Pack years: 56.00     Types: Cigarettes     Start date:      Last attempt to quit: 2016     Years since quittin.2    Smokeless tobacco: Never Used   Substance and Sexual Activity    Alcohol use: No    Drug use: No    Sexual activity: Not Currently   Lifestyle    Physical activity     Days per week: None     Minutes per session: None    Stress: None   Relationships    Social connections     Talks on phone: None     Gets together: None     Attends [employer] service: None     Active member of club or organization: None     Attends meetings of clubs or organizations: None     Relationship status: None    Intimate partner violence     Fear of current or ex partner: None     Emotionally abused: None     Physically abused: None     Forced sexual activity: None   Other Topics Concern    None   Social History Narrative    None       Current Outpatient Medications   Medication Sig Dispense Refill    metoprolol tartrate (LOPRESSOR) 25 MG tablet Take 0.5 tablets by mouth 2 times daily 90 tablet 1    clopidogrel (PLAVIX) 75 MG tablet Take 1 tablet by mouth daily 90 tablet 1    atorvastatin (LIPITOR) 40 MG tablet Take 1 tablet by mouth daily 90 tablet 1    traMADol (ULTRAM) 50 MG tablet Take 1 tablet by mouth every 8 hours as needed for Pain for up to 30 days. 90 tablet 0    empagliflozin (JARDIANCE) 25 MG tablet Take 25 mg by mouth daily 30 tablet 2    gabapentin (NEURONTIN) 300 MG capsule TAKE 1 CAPSULE BY MOUTH TWICE DAILY 60 capsule 1    pantoprazole (PROTONIX) 40 MG tablet TK 1 T PO QD  3    zolpidem (AMBIEN) 10 MG tablet Take 1 tablet by mouth every evening for 30 days. (Patient not taking: Reported on 7/31/2020) 30 tablet 0    polyethylene glycol (GLYCOLAX) 17 GM/SCOOP powder Take 17 g by mouth daily as needed (constipation) 510 g 0    XIAFLEX 0.9 MG SOLR injection        No current facility-administered medications for this visit. No Known Allergies    Chief Complaint:  Marian Louis is here today for follow up and management/recomendations for CAD     History of Present Illness: Marian Louis states that He does house work, goes up the stairs, does yard work, and goes shopping. He denies any chest discomfort, dyspnea on exertion, orthopnea/PND, or lower extremity edema. He denies any palpitations or presyncopal symptoms. He denies any orthopnea/PND. He denies any palpitations or presyncopal symptoms. REVIEW OF SYSTEMS:  As above.  Patient does not complain of any fever, chills, nausea, vomiting or diarrhea. No focal, motor or neurological deficits. No changes in his/her vision, hearing, bowel or bladder habits. He is not known to have a history of thyroid problems. No recent nose bleeds. PHYSICAL EXAM:  Vitals:    07/31/20 1036   BP: 138/60   Pulse: 51   Weight: 140 lb 6.4 oz (63.7 kg)   Height: 5' 6\" (1.676 m)       GENERAL:  He is alert and oriented x 3, communicates well, in no distress. NECK:  No masses, trachea is mid position. Supple, full ROM, no JVD or bruits. No palpable thyromegaly or lymphadenopathy. HEART:  Regular rate and rhythm. Normal S1 and S2. There is an S4 gallop and no abnormal murmurs on today's exam.  LUNGS:  Clear to auscultation bilaterally. No use of accessory muscles. symmetrical excursion. Good effort. ABDOMEN:  Soft, non-tender. Normal bowel sounds. EXTREMITIES:  Full ROM x 4. No bilateral lower extremity edema. Good distal pulses. EYES:  Extraocular muscles intact. PERRL. Normal lids & conjunctiva. ENT:  Nares are clear & not bleeding. Moist mucosa. Normal lips formation. No external masses   NEURO: no tremors, full ROM x 4, EOMI. SKIN:  Warm, dry and intact. Normal turgor. EKG: Sinus rhythm, 51 bpm, nl axis, nonspecific ST - T wave changes. Assessment:   1. Coronary artery disease as outlined above. He denies symptoms of recurring ischemia at this time. 2. PFO  3. TIA  4. Carotid stenosis  5. Hypertension, fairly well controled at this time. 6. Hypercholesterolemia        Recommendations:  1. He is following the cholesterol with Dr. Fani Caal. 2. Continue current cardiac medications today. Thank you for allowing me to participate in your patient's care.       [address], [address] SongECU Health Edgecombe HospitalMENA PRESTIGE [facility]  Interventional Cardiology

## 2020-08-03 ENCOUNTER — CARE COORDINATION (OUTPATIENT)
Dept: CARE COORDINATION | Age: 75
End: 2020-08-03

## 2020-08-03 NOTE — TELEPHONE ENCOUNTER
Unable to L/m for patient to call back. Patient put on recall list for future appt, patient no showed for VV on 6/18/20.

## 2020-08-20 ENCOUNTER — TELEPHONE (OUTPATIENT)
Dept: CASE MANAGEMENT | Age: 75
End: 2020-08-20

## 2020-08-21 ENCOUNTER — HOSPITAL ENCOUNTER (OUTPATIENT)
Dept: CT IMAGING | Age: 75
Discharge: HOME | End: 2020-08-23
Payer: MEDICAID

## 2020-08-21 ENCOUNTER — HOSPITAL ENCOUNTER (OUTPATIENT)
Age: 75
Discharge: HOME | End: 2020-08-21
Payer: MEDICAID

## 2020-08-21 LAB
ANION GAP SERPL CALCULATED.3IONS-SCNC: 7 MMOL/L (ref 7–16)
BUN BLDV-MCNC: 14 MG/DL (ref 8–23)
CALCIUM SERPL-MCNC: 9.7 MG/DL (ref 8.6–10.2)
CHLORIDE BLD-SCNC: 102 MMOL/L (ref 98–107)
CO2: 29 MMOL/L (ref 22–29)
CREAT SERPL-MCNC: 1.2 MG/DL (ref 0.7–1.2)
GFR AFRICAN AMERICAN: >60
GFR NON-AFRICAN AMERICAN: 59 ML/MIN/1.73
GLUCOSE BLD-MCNC: 174 MG/DL (ref 74–99)
POTASSIUM SERPL-SCNC: 4.4 MMOL/L (ref 3.5–5)
SODIUM BLD-SCNC: 138 MMOL/L (ref 132–146)

## 2020-08-21 PROCEDURE — 36415 COLL VENOUS BLD VENIPUNCTURE: CPT

## 2020-08-21 PROCEDURE — 80048 BASIC METABOLIC PNL TOTAL CA: CPT

## 2020-08-21 PROCEDURE — G0297 LDCT FOR LUNG CA SCREEN: HCPCS

## 2020-08-22 ENCOUNTER — HOSPITAL ENCOUNTER (EMERGENCY)
Age: 75
Discharge: HOME | End: 2020-08-22
Payer: MEDICARE

## 2020-08-22 ENCOUNTER — APPOINTMENT (OUTPATIENT)
Dept: GENERAL RADIOLOGY | Age: 75
End: 2020-08-22
Payer: MEDICARE

## 2020-08-22 ENCOUNTER — APPOINTMENT (OUTPATIENT)
Dept: CT IMAGING | Age: 75
End: 2020-08-22
Payer: MEDICARE

## 2020-08-22 VITALS
RESPIRATION RATE: 16 BRPM | HEART RATE: 62 BPM | TEMPERATURE: 97.8 F | DIASTOLIC BLOOD PRESSURE: 81 MMHG | SYSTOLIC BLOOD PRESSURE: 186 MMHG | OXYGEN SATURATION: 97 %

## 2020-08-22 PROCEDURE — 6370000000 HC RX 637 (ALT 250 FOR IP): Performed by: PHYSICIAN ASSISTANT

## 2020-08-22 PROCEDURE — 73610 X-RAY EXAM OF ANKLE: CPT

## 2020-08-22 PROCEDURE — 99285 EMERGENCY DEPT VISIT HI MDM: CPT

## 2020-08-22 PROCEDURE — 29515 APPLICATION SHORT LEG SPLINT: CPT

## 2020-08-22 PROCEDURE — 73630 X-RAY EXAM OF FOOT: CPT

## 2020-08-22 PROCEDURE — 73700 CT LOWER EXTREMITY W/O DYE: CPT

## 2020-08-22 PROCEDURE — 99284 EMERGENCY DEPT VISIT MOD MDM: CPT

## 2020-08-22 RX ORDER — HYDROCODONE BITARTRATE AND ACETAMINOPHEN 5; 325 MG/1; MG/1
1 TABLET ORAL EVERY 6 HOURS PRN
Qty: 12 TABLET | Refills: 0 | Status: SHIPPED | OUTPATIENT
Start: 2020-08-22 | End: 2020-08-25

## 2020-08-22 RX ORDER — HYDROCODONE BITARTRATE AND ACETAMINOPHEN 5; 325 MG/1; MG/1
1 TABLET ORAL ONCE
Status: COMPLETED | OUTPATIENT
Start: 2020-08-22 | End: 2020-08-22

## 2020-08-22 RX ADMIN — HYDROCODONE BITARTRATE AND ACETAMINOPHEN 1 TABLET: 5; 325 TABLET ORAL at 21:09

## 2020-08-22 ASSESSMENT — PAIN DESCRIPTION - PAIN TYPE: TYPE: ACUTE PAIN

## 2020-08-22 ASSESSMENT — PAIN DESCRIPTION - LOCATION: LOCATION: ANKLE

## 2020-08-22 ASSESSMENT — PAIN SCALES - GENERAL
PAINLEVEL_OUTOF10: 9
PAINLEVEL_OUTOF10: 9

## 2020-08-22 ASSESSMENT — PAIN DESCRIPTION - ORIENTATION: ORIENTATION: RIGHT

## 2020-08-22 ASSESSMENT — PAIN DESCRIPTION - PROGRESSION: CLINICAL_PROGRESSION: NOT CHANGED

## 2020-08-22 ASSESSMENT — PAIN DESCRIPTION - DESCRIPTORS: DESCRIPTORS: ACHING

## 2020-08-22 ASSESSMENT — PAIN DESCRIPTION - FREQUENCY: FREQUENCY: CONTINUOUS

## 2020-08-23 NOTE — CONSULTS
Department of Podiatry   Consult Note        Reason for Consult:  Calcaneal Fracture    CHIEF COMPLAINT:  \"Heel Pain\"    HISTORY OF PRESENT ILLNESS:                The patient is a 66 y.o. male who presents to the ED after a fall off a ladder. Patient states that he was on the top of a 7 step ladder when it started to tilt, he subsequently jumped off. This occurred about 4-5 hours ago. Patient states he landed flat on his feet. Denies hitting his head. He denies lower back or neck pain. Patient denies any N/V/D/F/C/SOB/CP. Past Medical History:        Diagnosis Date    Anxiety     Asymptomatic bilateral carotid artery stenosis     Bowel obstruction (HCC)     CAD (coronary artery disease)     [facility]) [facility]     Cryptogenic stroke ([facility]     NSTEMI (non-ST elevated [facility] infarction) ([facility]    ·     Past Surgical History:        Procedure Laterality Date    BACK SURGERY      CARDIAC CATHETERIZATION  4/26/2016    Dr Ford Silk GRAFT  4/27/16    x3    DIAGNOSTIC CARDIAC CATH LAB PROCEDURE  04/26/2016    INSERTABLE CARDIAC MONITOR Left 10/11/2016    LINQ loop recorder, [employer], Dr. Brittaney Britt   ·     Medications Prior to Admission:    · Not in a hospital admission. Allergies:  Patient has no known allergies. Social History:   · TOBACCO:   reports that he quit smoking about 4 years ago. His smoking use included cigarettes. He started smoking about 60 years ago. He has a 56.00 pack-year smoking history. He has never used smokeless tobacco.  · ETOH:   reports no history of alcohol use. DRUGS:   Social History     Substance and Sexual Activity   Drug Use No   ·     Family History:       Problem Relation Age of Onset    Heart Surgery Mother     Hypertension Mother     Heart Attack Father     Heart Surgery Brother    ·       LOWER EXTREMITY EXAMINATION     VASCULAR:  DP and PT pulses are palpable. CFT < 5 seconds B/L.   Warm to warm from the tibial tuberosity to the distal aspect of the digits dorsally. Hair growth noted to the distal aspects dorsally. NEUROLOGIC:  Sensation to light touch and protective sensation is intact. DERM:  Significant edema is noted to the lateral aspect of the left ankle and hindfoot. Significant eccymosis noted to lateral hindfoot and ankle. There is no noted skin tending or open lesions. Skin is intact. MUSCULOSKELETAL:Negative wrinkle sign on pinch test. Pain on palpation to lateral hindfoot. ROM and muscle strength are deferred at this time No pain on palpation to ankle. No gross deformity is noted. RADIOLOGY:  CT FOOT LEFT WO CONTRAST   Final Result      Comminuted fracture of the calcaneus. XR FOOT LEFT (MIN 3 VIEWS)   Final Result   Tongue type, comminuted intra-articular calcaneal   fracture. Recommend orthopedic consultation. XR ANKLE LEFT (MIN 3 VIEWS)   Final Result   Tongue type, Intra-articular comminuted calcaneal   fracture. Vitals:    BP (!) 186/81   Pulse 62   Temp 97.8 °F (36.6 °C) (Temporal)   Resp 16   SpO2 97%     LABS:   No results for input(s): WBC, HGB, HCT, PLT in the last 72 hours. Recent Labs     08/21/20  0919      K 4.4      CO2 29   BUN 14   CREATININE 1.2     No results for input(s): PROT, INR, APTT in the last 72 hours. ASSESSMENTS:   1) Calcaneal Fracture, left foot         PLAN:    Patient was examined and evaluated. - Reviewed patient's recent lab results and pertinent diagnostic imaging.  - XR: comminuted tongue type calcaneal fracture with extension into the posterior facet  -CT: confirmation of multiple fracture lines that extend into the posterior facet, minimal joint depression around 1.7cm.  - due to patients swelling he was placed into compression dressing and posterior splint  - pt and daughter educated on conservative and surgical txn options.  Will discuss further at f/u   - will f/u with myself and

## 2020-08-23 NOTE — ED PROVIDER NOTES
[facility]       Department of Emergency Medicine   ED  Provider Note  Admit Date/RoomTime: 8/22/2020  8:07 PM  ED Room: 36/36  Chief Complaint:   Foot Injury (Pt reports he fell from a 6 ft ladder today and landed on his left foot, now having pain)    History of Present Illness   Source of history provided by:  patient. History/Exam Limitations: none. Rhiannon Chicas is a 66 y.o. old male presenting to the emergency department by private vehicle, for Left foot and ankle pain which occured a few minute(s) prior to arrival.  Cause of complaint: injured when a ladder that he was on while trimming some bushes at his house started to fall and he jumped from it. He reports he landed on his feet when while his right ankle twisted his left foot just landed solid while at home. There has been a history of no prior problems with this area in the past.  Since onset the symptoms have been marked in degree with inability to bear weight. His pain is aggraveated by standing, touch and relieved by rest and elevation. Tetanus Status: up to date. Patient denies hitting his head, patient denies neck pain or any other injuries. ROS    Pertinent positives and negatives are stated within HPI, all other systems reviewed and are negative. Past Surgical History:   Procedure Laterality Date    BACK SURGERY      CARDIAC CATHETERIZATION  4/26/2016    Dr Jc Trejo GRAFT  4/27/16    x3    DIAGNOSTIC CARDIAC CATH LAB PROCEDURE  04/26/2016    INSERTABLE CARDIAC MONITOR Left 10/11/2016    LINQ loop recorder, [employer], Dr. Giulia Barger   Social History:  reports that he quit smoking about 4 years ago. His smoking use included cigarettes. He started smoking about 60 years ago. He has a 56.00 pack-year smoking history. He has never used smokeless tobacco. He reports that he does not drink alcohol or use drugs.   Family History: family history includes Heart Attack in his father; Heart Surgery in his brother and mother; Hypertension in his mother. Allergies: Patient has no known allergies. Physical Exam           ED Triage Vitals [08/22/20 2004]   BP Temp Temp Source Pulse Resp SpO2 Height Weight   (!) 186/81 97.8 °F (36.6 °C) Temporal 62 16 97 % -- --       Oxygen Saturation Interpretation: Normal.    Constitutional:  Alert, development consistent with age. Neck:  Normal ROM. Supple. Ankle:  Left Lateral and calcaneal:              Tenderness:  severe. Swelling: Moderate. Deformity: no.             ROM: diminished range with pain. Skin:  swelling. Neurovascular: Motor deficit: none. Sensory deficit:   none. Pulse deficit: none. Capillary refill: normal.  Knee:              Tenderness:  none. Swelling: None. Deformity: no.             ROM: full range of motion. Skin:  no erythema, rash or wounds noted. Foot:              Tenderness:  severe. Swelling: Moderate. Deformity: no.             ROM: diminished range with pain. Skin:  no erythema, rash or wounds noted. Gait:  Not tested. Lymphatics: No lymphangitis or adenopathy noted. Neurological:  Oriented. Motor functions intact. Lab / Imaging Results   (All laboratory and radiology results have been personally reviewed by myself)  Labs:  No results found for this visit on 08/22/20. Imaging: All Radiology results interpreted by Radiologist unless otherwise noted. CT FOOT LEFT WO CONTRAST   Final Result      Comminuted fracture of the calcaneus. XR FOOT LEFT (MIN 3 VIEWS)   Final Result   Tongue type, comminuted intra-articular calcaneal   fracture. Recommend orthopedic consultation. XR ANKLE LEFT (MIN 3 VIEWS)   Final Result   Tongue type, Intra-articular comminuted calcaneal   fracture.         ED Course / Medical Decision Making     Medications HYDROcodone-acetaminophen (NORCO) 5-325 MG per tablet 1 tablet (1 tablet Oral Given 8/22/20 2109)        Consults:   IP CONSULT TO PODIATRY    Procedure(s):   SEE ED note    MDM:       Films were obtained based on high suspicion for bony injury as per history/physical findings . Plan is subsequently for symptom control, limited use and  immobilization with appropriate outpatient follow-up. Dr. Lida Solomon podiatry resident came in and also examined patient and explained to them that this would be a surgical issue and that they would see him outpatient in office in 2 days. He told me that he would be providing patient with a single leg scooter but could use crutches for now until he has appointment. Counseling: The emergency provider has spoken with the patient and family member daughter and discussed todays results, in addition to providing specific details for the plan of care and counseling regarding the diagnosis and prognosis. Questions are answered at this time and they are agreeable with the plan. Assessment      1. Closed displaced fracture of left calcaneus, unspecified portion of calcaneus, initial encounter      Plan   Discharge to home  Patient condition is stable    New Medications     Discharge Medication List as of 8/22/2020  9:41 PM      START taking these medications    Details   HYDROcodone-acetaminophen (NORCO) 5-325 MG per tablet Take 1 tablet by mouth every 6 hours as needed for Pain for up to 3 days. , Disp-12 tablet,R-0Print           Electronically signed by Rudolph Altamirano PA-C   DD: 8/23/20  **This report was transcribed using voice recognition software. Every effort was made to ensure accuracy; however, inadvertent computerized transcription errors may be present.   END OF ED PROVIDER NOTE       Rudolph Altamirano PA-C  08/23/20 [id number]

## 2020-08-23 NOTE — ED NOTES
PROCEDURE NOTE  8/23/20         SPLINT  APPLICATION  Risks, benefits and alternatives (for applicable procedures below) described. Performed By: Reji Hsu PA-C. Indication:  fracture of left calcaneus . Procedure:   A short  left leg  Posterior splint was applied by me. The patient did tolerate the procedure well.              Reji Hsu PA-C  08/23/20 8013

## 2020-08-24 ENCOUNTER — TELEPHONE (OUTPATIENT)
Dept: CASE MANAGEMENT | Age: 75
End: 2020-08-24

## 2020-08-24 NOTE — TELEPHONE ENCOUNTER
No call, encounter opened to process CT Lung Screening. CT Lung Screen: 8/21/2020    Impression    Lung rads category 2.     Benign appearance of behavior. 1 cm    groundglass nodule in the right upper lobe and 3 mm nodules in the    right upper lobe and right lower lobe without change. Continued yearly    surveillance is recommended.         Additional findings.     Severe coronary artery calcification, COPD and atelectasis in lung    bases. Pack years: 64    Social History     Tobacco Use  Smoking Status: Former Smoker    Start Date: 1960   Quit Date: 4/24/2016   Types: Cigarettes   Packs/Day: 1.00   Years: 56.00   Pack Years: 64   Smokeless Tobacco: Never Used         Results letter sent to patient via my chart or mailed.      [address]

## 2020-10-23 ENCOUNTER — HOSPITAL ENCOUNTER (OUTPATIENT)
Age: 75
Discharge: HOME | End: 2020-10-25
Payer: MEDICAID

## 2020-10-23 LAB
ALBUMIN SERPL-MCNC: 3.6 G/DL (ref 3.5–5.2)
ALP BLD-CCNC: 207 U/L (ref 40–129)
ALT SERPL-CCNC: 16 U/L (ref 0–40)
ANION GAP SERPL CALCULATED.3IONS-SCNC: 13 MMOL/L (ref 7–16)
AST SERPL-CCNC: 19 U/L (ref 0–39)
BASOPHILS ABSOLUTE: 0.08 E9/L (ref 0–0.2)
BASOPHILS RELATIVE PERCENT: 0.9 % (ref 0–2)
BILIRUB SERPL-MCNC: 0.7 MG/DL (ref 0–1.2)
BUN BLDV-MCNC: 16 MG/DL (ref 8–23)
CALCIUM SERPL-MCNC: 9.4 MG/DL (ref 8.6–10.2)
CHLORIDE BLD-SCNC: 103 MMOL/L (ref 98–107)
CHOLESTEROL, TOTAL: 133 MG/DL (ref 0–199)
CO2: 25 MMOL/L (ref 22–29)
CREAT SERPL-MCNC: 1 MG/DL (ref 0.7–1.2)
EOSINOPHILS ABSOLUTE: 0.41 E9/L (ref 0.05–0.5)
EOSINOPHILS RELATIVE PERCENT: 4.4 % (ref 0–6)
GFR AFRICAN AMERICAN: >60
GFR NON-AFRICAN AMERICAN: >60 ML/MIN/1.73
GLUCOSE BLD-MCNC: 110 MG/DL (ref 74–99)
HCT VFR BLD CALC: 45.1 % (ref 37–54)
HDLC SERPL-MCNC: 36 MG/DL
HEMOGLOBIN: 14.3 G/DL (ref 12.5–16.5)
IMMATURE GRANULOCYTES #: 0.13 E9/L
IMMATURE GRANULOCYTES %: 1.4 % (ref 0–5)
LDL CHOLESTEROL CALCULATED: 62 MG/DL (ref 0–99)
LYMPHOCYTES ABSOLUTE: 2.41 E9/L (ref 1.5–4)
LYMPHOCYTES RELATIVE PERCENT: 26.1 % (ref 20–42)
MCH RBC QN AUTO: 30.8 PG (ref 26–35)
MCHC RBC AUTO-ENTMCNC: 31.7 % (ref 32–34.5)
MCV RBC AUTO: 97.2 FL (ref 80–99.9)
MONOCYTES ABSOLUTE: 0.95 E9/L (ref 0.1–0.95)
MONOCYTES RELATIVE PERCENT: 10.3 % (ref 2–12)
NEUTROPHILS ABSOLUTE: 5.25 E9/L (ref 1.8–7.3)
NEUTROPHILS RELATIVE PERCENT: 56.9 % (ref 43–80)
PDW BLD-RTO: 14.6 FL (ref 11.5–15)
PLATELET # BLD: 261 E9/L (ref 130–450)
PMV BLD AUTO: 12 FL (ref 7–12)
POTASSIUM SERPL-SCNC: 4.4 MMOL/L (ref 3.5–5)
RBC # BLD: 4.64 E12/L (ref 3.8–5.8)
SODIUM BLD-SCNC: 141 MMOL/L (ref 132–146)
TOTAL PROTEIN: 6.9 G/DL (ref 6.4–8.3)
TRIGL SERPL-MCNC: 174 MG/DL (ref 0–149)
VLDLC SERPL CALC-MCNC: 35 MG/DL
WBC # BLD: 9.2 E9/L (ref 4.5–11.5)

## 2020-10-23 PROCEDURE — 80061 LIPID PANEL: CPT

## 2020-10-23 PROCEDURE — 85025 COMPLETE CBC W/AUTO DIFF WBC: CPT

## 2020-10-23 PROCEDURE — 80053 COMPREHEN METABOLIC PANEL: CPT

## 2020-11-24 ENCOUNTER — TELEPHONE (OUTPATIENT)
Dept: NON INVASIVE DIAGNOSTICS | Age: 75
End: 2020-11-24

## 2020-12-22 ENCOUNTER — HOSPITAL ENCOUNTER (OUTPATIENT)
Dept: NUCLEAR MEDICINE | Age: 75
Discharge: HOME | End: 2020-12-22
Payer: MEDICAID

## 2020-12-22 PROCEDURE — 78264 GASTRIC EMPTYING IMG STUDY: CPT

## 2020-12-22 PROCEDURE — A9541 TC99M SULFUR COLLOID: HCPCS | Performed by: RADIOLOGY

## 2020-12-22 PROCEDURE — 3430000000 HC RX DIAGNOSTIC RADIOPHARMACEUTICAL: Performed by: RADIOLOGY

## 2020-12-22 RX ADMIN — Medication 1 MILLICURIE: at 07:54

## 2021-01-22 DIAGNOSIS — R73.9 HYPERGLYCEMIA: ICD-10-CM

## 2021-01-22 DIAGNOSIS — I10 ESSENTIAL HYPERTENSION: ICD-10-CM

## 2021-01-22 PROBLEM — Z79.4 TYPE 2 DIABETES MELLITUS WITHOUT COMPLICATION, WITH LONG-TERM CURRENT USE OF INSULIN (HCC): Status: ACTIVE | Noted: 2021-01-22

## 2021-01-22 PROBLEM — Z79.4 TYPE 2 DIABETES MELLITUS WITHOUT COMPLICATION, WITH LONG-TERM CURRENT USE OF INSULIN (HCC): Status: RESOLVED | Noted: 2021-01-22 | Resolved: 2021-01-22

## 2021-01-22 PROBLEM — E11.9 TYPE 2 DIABETES MELLITUS WITHOUT COMPLICATION, WITH LONG-TERM CURRENT USE OF INSULIN (HCC): Status: RESOLVED | Noted: 2021-01-22 | Resolved: 2021-01-22

## 2021-01-22 PROBLEM — E11.9 TYPE 2 DIABETES MELLITUS WITHOUT COMPLICATION, WITH LONG-TERM CURRENT USE OF INSULIN (HCC): Status: ACTIVE | Noted: 2021-01-22

## 2021-01-22 PROBLEM — I63.9 CRYPTOGENIC STROKE (HCC): Status: RESOLVED | Noted: 2017-04-21 | Resolved: 2021-01-22

## 2021-01-22 PROBLEM — R73.03 PREDIABETES: Status: RESOLVED | Noted: 2019-04-29 | Resolved: 2021-01-22

## 2021-01-29 DIAGNOSIS — R10.84 GENERALIZED ABDOMINAL PAIN: ICD-10-CM

## 2021-03-03 NOTE — PROGRESS NOTES
medications for this visit. No Known Allergies    3/27/19 SUBJECTIVE: Ayah Sanches presents to the office today for the management of these Electrophysiology conditions: cryptogenic stroke, ICM in situ. He presents today for his one year office follow-up. He presents today for his yearly follow-up. He has had no recurrent stroke-like symptoms. He also denies angina, dyspnea, syncope, orthopnea and PND. His ILR is nearing 3 year longevity (October 2019). He would like to have the device explanted when it reaches end-of-service which we discussed today. He is enrolled in [employer] remote monitoring     3/4/2021 Ayah Sanches presents for office follow-up and ILR check. He was last seen in office as noted above. At that visit, he stated he wanted the ILR removed when it reached end-of-service. The device reached RRT on 4/19/2020. He was a \"no show\" for VV in July. A remote transmission on 8/6/20 showed the device reached EOS (end-of-service) on 5/13/20 with no arrhythmia noted. He was a \"no show\" for our scheduled office appointment 10/28/20; a letter was mailed. Today he is feeling well and offers no complaints today. He denies angina, dyspnea, syncope, orthopnea, and PND. He would like to have the ILR removed; confirmed the device is at end-of-service. Review of Systems   Respiratory: Negative. Cardiovascular: Negative. Neurological: Negative. All other systems reviewed and are negative. PHYSICAL EXAM:  Vitals:    03/04/21 1034   BP: 124/66   Pulse: 57   Resp: 18   Weight: 143 lb 3.2 oz (65 kg)   Height: 5' 3\" (1.6 m)     Constitutional: Oriented to person, place, and time. Well-developed and cooperative. Head: Normocephalic and atraumatic. Eyes: Conjunctivae are normal.   Neck: No hepatojugular reflux and no JVD present. Cardiovascular: S1 normal, S2 normal and intact distal pulses. Normal rate and rhythm. PMI is not displaced.    Pulmonary/Chest: Effort normal and breath sounds normal. No respiratory distress. Abdominal: Soft. No tenderness. Musculoskeletal: Normal range of motion of all extremities, no muscle weakness. Neurological: Alert and oriented to person, place, and time. Gait normal.   Skin: Skin is warm and dry. No bruising, no ecchymosis and no rash noted. Extremity: No clubbing or cyanosis. No edema. Psychiatric: Normal mood and affect. Thought content normal.   ILR site: stable, well healed, no evidence of erosion      TSH:   Lab Results   Component Value Date    TSH 2.370 06/01/2020      Cardiac Injury Profile: No results for input(s): CKTOTAL, CKMB, CKMBINDEX, TROPONINI in the last 72 hours. Lipid Profile:   Lab Results   Component Value Date    TRIG 158 01/22/2021    HDL 40 01/22/2021    LDLCALC 78 01/22/2021    CHOL 150 01/22/2021      Hemoglobin A1C: No components found for: HGBA1C   Xray:   No orders to display         Pertinent Cardiac Testing:     ICM interrogation: SR, no arrhythmia   -3/4/21: confirmed device at end-of-service    HEIDY 10/10/16  Lima Romero MD 10/10/2016    Narrative         Transesophageal Echocardiography Report (HEIDY)      Demographics      Patient Name       LOUIS       Gender              Male                      SALINA      Medical Record     [id number]       Room Number         0648   Number      Account #         [de-identified]     Procedure Date      10/10/2016      Corporate ID                      Ordering Physician  Joan Cade      Accession Number   [id number]      Referring Physician      Date of Birth      1945     Sonographer         Jcarlos MCCALLUM      Age                71 year(s)     Interpreting        Dwight Clement MD                                     Physician                                        Any Other     Procedure    Type of Study      HEIDY procedure:Transesophageal Echo (HEIDY).      Procedure Date  Date: 10/10/2016 Start: 08:04 AM    Study Location: Portable    Indications:CVA. Patient Status: Routine    Contrast Medium: Bubble Study. Height: 66 inches Weight: 129 pounds BSA: 1.66 m^2 BMI: 20.82 kg/m^2    HEIDY Performed By: the attending and the sonographer     Type of Anesthesia: Moderate sedation     Allergies    - No known allergies.     Findings      Left Ventricle   Left ventricle size is normal.   Normal left ventricular systolic function.   Normal left ventricular ejection fraction.      Right Ventricle   Normal right ventricular function.      Left Atrium   Normal sized left atrium.   No evidence of a left atrial appendage thrombus.   Right to left interatrial shunting present on bubble study (+PFO).     Right Atrium   Normal sized right atrium.      Mitral Valve   Physiologic and/or trace mitral regurgitation.      Tricuspid Valve   Mild tricuspid regurgitation.      Aortic Valve   Aortic valve opens well.   The aortic valve is trileaflet.   No evidence of hemodynamically significant aortic regurgitation.      Pulmonic Valve   No evidence of hemodynamically significant pulmonic regurgitation.      Pericardial Effusion   No evidence of a hemodynamically significant pericardial effusion.      Aorta   Aortic root dimension within normal limits.      Conclusions      Summary   Normal left ventricular systolic function.   Normal left ventricular ejection fraction.   Normal right ventricular function.   No evidence of a left atrial appendage thrombus.   Right to left interatrial shunting present on bubble study (+PFO).  Mild tricuspid regurgitation.      Signature      ----------------------------------------------------------------   Electronically signed by Danita Stern MD(Interpreting   [id number]) on 10/10/2016 11:18 AM           Impression:    1. Cryptogenic stroke  - ICM implantation 10/11/16  - follows with Dr Theressa Dubin    2. CAD  4-16 LM stenosis  CABG  LIMA - LAD  SVG - OM  SVG - Ramus  - follows with Dr Yani Mills    3. HTN  - lopressor    4. HLD  - patient taking lipitor and zocor    5. PFO  - S/p HEIDY 10/10/16:  Right to left interatrial shunting present on bubble study (+PFO). Recommendations:    Mr Ashton's ILR, implanted in 2016, reached end-of-service in 5/2020 with no evidence of AF. He wants the device removed. We briefly reviewed risks/benefits of the procedure including bleeding and infection and he would like to proceed. The removal will be scheduled through our office. Thank you for allowing me to participate in their care. I have spent a total of 30 minutes with the patient reviewing the above stated recommendations.  And a total of >50% of that time involved face-to-face time providing counseling and or coordination of care with the other providers    DINAH Be-ROSALINO, [address] [facility]    CC: Dr Callum Miranda

## 2021-03-04 ENCOUNTER — OFFICE VISIT (OUTPATIENT)
Dept: NON INVASIVE DIAGNOSTICS | Age: 76
End: 2021-03-04
Payer: MEDICAID

## 2021-03-04 VITALS
HEIGHT: 63 IN | BODY MASS INDEX: 25.37 KG/M2 | WEIGHT: 143.2 LBS | SYSTOLIC BLOOD PRESSURE: 124 MMHG | HEART RATE: 57 BPM | RESPIRATION RATE: 18 BRPM | DIASTOLIC BLOOD PRESSURE: 66 MMHG

## 2021-03-04 DIAGNOSIS — Z01.818 PRE-OP TESTING: ICD-10-CM

## 2021-03-04 DIAGNOSIS — Z95.818 STATUS POST PLACEMENT OF IMPLANTABLE LOOP RECORDER: Primary | ICD-10-CM

## 2021-03-04 PROCEDURE — G8484 FLU IMMUNIZE NO ADMIN: HCPCS | Performed by: NURSE PRACTITIONER

## 2021-03-04 PROCEDURE — G8417 CALC BMI ABV UP PARAM F/U: HCPCS | Performed by: NURSE PRACTITIONER

## 2021-03-04 PROCEDURE — 93285 PRGRMG DEV EVAL SCRMS IP: CPT | Performed by: NURSE PRACTITIONER

## 2021-03-04 PROCEDURE — 4040F PNEUMOC VAC/ADMIN/RCVD: CPT | Performed by: NURSE PRACTITIONER

## 2021-03-04 PROCEDURE — 1036F TOBACCO NON-USER: CPT | Performed by: NURSE PRACTITIONER

## 2021-03-04 PROCEDURE — G8427 DOCREV CUR MEDS BY ELIG CLIN: HCPCS | Performed by: NURSE PRACTITIONER

## 2021-03-04 PROCEDURE — 1123F ACP DISCUSS/DSCN MKR DOCD: CPT | Performed by: NURSE PRACTITIONER

## 2021-03-04 PROCEDURE — 93000 ELECTROCARDIOGRAM COMPLETE: CPT | Performed by: INTERNAL MEDICINE

## 2021-03-04 PROCEDURE — 99214 OFFICE O/P EST MOD 30 MIN: CPT | Performed by: NURSE PRACTITIONER

## 2021-03-04 ASSESSMENT — ENCOUNTER SYMPTOMS: RESPIRATORY NEGATIVE: 1

## 2021-03-15 DIAGNOSIS — Z01.818 PRE-OP TESTING: ICD-10-CM

## 2021-03-17 LAB
SARS-COV-2: NOT DETECTED
SOURCE: NORMAL

## 2021-03-19 ENCOUNTER — HOSPITAL ENCOUNTER (OUTPATIENT)
Dept: CARDIAC CATH/INVASIVE PROCEDURES | Age: 76
Discharge: HOME | End: 2021-03-19
Payer: MEDICAID

## 2021-03-19 VITALS
TEMPERATURE: 98.9 F | SYSTOLIC BLOOD PRESSURE: 201 MMHG | HEART RATE: 59 BPM | HEIGHT: 66 IN | WEIGHT: 145 LBS | BODY MASS INDEX: 23.3 KG/M2 | DIASTOLIC BLOOD PRESSURE: 88 MMHG | OXYGEN SATURATION: 97 %

## 2021-03-19 PROCEDURE — 2500000003 HC RX 250 WO HCPCS

## 2021-03-19 PROCEDURE — 6360000002 HC RX W HCPCS

## 2021-03-19 PROCEDURE — 2709999900 HC NON-CHARGEABLE SUPPLY

## 2021-03-19 PROCEDURE — 2580000003 HC RX 258

## 2021-03-19 PROCEDURE — 33286 RMVL SUBQ CAR RHYTHM MNTR: CPT

## 2021-03-19 PROCEDURE — 33286 RMVL SUBQ CAR RHYTHM MNTR: CPT | Performed by: STUDENT IN AN ORGANIZED HEALTH CARE EDUCATION/TRAINING PROGRAM

## 2021-03-22 ENCOUNTER — TELEPHONE (OUTPATIENT)
Dept: CARDIOLOGY | Age: 76
End: 2021-03-22

## 2021-03-22 NOTE — TELEPHONE ENCOUNTER
I called Syd Hess to see how he's doing since his Linq Explant on 3/19/21. He states he's doing well. He said he had a fever yesterday; but he's attributing it to the COVID vaccine he got on 3/19. Denies a fever today or any bleeding, drainage, or swelling from insertion site. Aquacel dsg remains intact. I reminded him to remove the aquacel dsg on Friday 3/26/21. He's aware of his follow up appt @ the [facility] [address] on 4/1/21 at 1pm .  He offers no complaints and is thankful for the call.

## 2021-04-01 ENCOUNTER — NURSE ONLY (OUTPATIENT)
Dept: NON INVASIVE DIAGNOSTICS | Age: 76
End: 2021-04-01

## 2021-04-01 NOTE — PROGRESS NOTES
Wound check post LINQ explant. Wound check: Steri strips removed. Incision without redness, edema or drainage. Plan: Follow up as needed.        Bogdan Hearn

## 2021-05-24 PROBLEM — E11.9 TYPE 2 DIABETES MELLITUS WITHOUT COMPLICATION, WITHOUT LONG-TERM CURRENT USE OF INSULIN (HCC): Status: ACTIVE | Noted: 2021-05-24

## 2021-05-25 DIAGNOSIS — Z12.5 SCREENING FOR PROSTATE CANCER: ICD-10-CM

## 2021-05-25 DIAGNOSIS — E11.9 TYPE 2 DIABETES MELLITUS WITHOUT COMPLICATION, WITHOUT LONG-TERM CURRENT USE OF INSULIN (HCC): ICD-10-CM

## 2021-05-25 DIAGNOSIS — E78.2 MIXED HYPERLIPIDEMIA: ICD-10-CM

## 2021-05-25 DIAGNOSIS — R53.83 FATIGUE, UNSPECIFIED TYPE: ICD-10-CM

## 2021-05-25 LAB
ALBUMIN SERPL-MCNC: 3.6 G/DL (ref 3.5–5.2)
ALP BLD-CCNC: 111 U/L (ref 40–129)
ALT SERPL-CCNC: 20 U/L (ref 0–40)
ANION GAP SERPL CALCULATED.3IONS-SCNC: 10 MMOL/L (ref 7–16)
AST SERPL-CCNC: 31 U/L (ref 0–39)
BASOPHILS ABSOLUTE: 0.06 E9/L (ref 0–0.2)
BASOPHILS RELATIVE PERCENT: 0.7 % (ref 0–2)
BILIRUB SERPL-MCNC: 0.8 MG/DL (ref 0–1.2)
BUN BLDV-MCNC: 13 MG/DL (ref 6–23)
CALCIUM SERPL-MCNC: 9.3 MG/DL (ref 8.6–10.2)
CHLORIDE BLD-SCNC: 103 MMOL/L (ref 98–107)
CHOLESTEROL, TOTAL: 148 MG/DL (ref 0–199)
CO2: 25 MMOL/L (ref 22–29)
CREAT SERPL-MCNC: 1.2 MG/DL (ref 0.7–1.2)
EOSINOPHILS ABSOLUTE: 0.43 E9/L (ref 0.05–0.5)
EOSINOPHILS RELATIVE PERCENT: 5.3 % (ref 0–6)
GFR AFRICAN AMERICAN: >60
GFR NON-AFRICAN AMERICAN: 59 ML/MIN/1.73
GLUCOSE FASTING: 80 MG/DL (ref 74–99)
HBA1C MFR BLD: 6.9 % (ref 4–5.6)
HCT VFR BLD CALC: 49.8 % (ref 37–54)
HDLC SERPL-MCNC: 34 MG/DL
HEMOGLOBIN: 15.3 G/DL (ref 12.5–16.5)
IMMATURE GRANULOCYTES #: 0.03 E9/L
IMMATURE GRANULOCYTES %: 0.4 % (ref 0–5)
LDL CHOLESTEROL CALCULATED: 76 MG/DL (ref 0–99)
LYMPHOCYTES ABSOLUTE: 2.33 E9/L (ref 1.5–4)
LYMPHOCYTES RELATIVE PERCENT: 28.7 % (ref 20–42)
MCH RBC QN AUTO: 30.2 PG (ref 26–35)
MCHC RBC AUTO-ENTMCNC: 30.7 % (ref 32–34.5)
MCV RBC AUTO: 98.2 FL (ref 80–99.9)
MONOCYTES ABSOLUTE: 0.89 E9/L (ref 0.1–0.95)
MONOCYTES RELATIVE PERCENT: 11 % (ref 2–12)
NEUTROPHILS ABSOLUTE: 4.37 E9/L (ref 1.8–7.3)
NEUTROPHILS RELATIVE PERCENT: 53.9 % (ref 43–80)
PDW BLD-RTO: 15 FL (ref 11.5–15)
PLATELET # BLD: 232 E9/L (ref 130–450)
PMV BLD AUTO: 12.7 FL (ref 7–12)
POTASSIUM SERPL-SCNC: 4.9 MMOL/L (ref 3.5–5)
PROSTATE SPECIFIC ANTIGEN: 2.25 NG/ML (ref 0–4)
RBC # BLD: 5.07 E12/L (ref 3.8–5.8)
SODIUM BLD-SCNC: 138 MMOL/L (ref 132–146)
TOTAL PROTEIN: 7 G/DL (ref 6.4–8.3)
TRIGL SERPL-MCNC: 192 MG/DL (ref 0–149)
TSH SERPL DL<=0.05 MIU/L-ACNC: 2.56 UIU/ML (ref 0.27–4.2)
VLDLC SERPL CALC-MCNC: 38 MG/DL
WBC # BLD: 8.1 E9/L (ref 4.5–11.5)

## 2021-08-09 ENCOUNTER — HOSPITAL ENCOUNTER (OUTPATIENT)
Dept: CT IMAGING | Age: 76
Discharge: HOME | End: 2021-08-11
Payer: MEDICARE

## 2021-08-09 DIAGNOSIS — R91.1 LUNG NODULE: ICD-10-CM

## 2021-08-09 PROCEDURE — 71250 CT THORAX DX C-: CPT

## 2021-08-25 RX ORDER — LOSARTAN POTASSIUM 25 MG/1
TABLET ORAL
Qty: 90 TABLET | Refills: 0 | Status: SHIPPED
Start: 2021-08-25 | End: 2021-11-23 | Stop reason: SDUPTHER

## 2021-09-08 PROBLEM — M51.16 LUMBAR DISC HERNIATION WITH RADICULOPATHY: Status: ACTIVE | Noted: 2021-09-08

## 2021-11-05 ENCOUNTER — APPOINTMENT (OUTPATIENT)
Dept: GENERAL RADIOLOGY | Age: 76
End: 2021-11-05
Payer: MEDICARE

## 2021-11-05 ENCOUNTER — HOSPITAL ENCOUNTER (EMERGENCY)
Age: 76
Discharge: HOME | End: 2021-11-05
Attending: EMERGENCY MEDICINE
Payer: MEDICARE

## 2021-11-05 ENCOUNTER — APPOINTMENT (OUTPATIENT)
Dept: CT IMAGING | Age: 76
End: 2021-11-05
Payer: MEDICARE

## 2021-11-05 VITALS
TEMPERATURE: 97 F | OXYGEN SATURATION: 97 % | RESPIRATION RATE: 18 BRPM | SYSTOLIC BLOOD PRESSURE: 145 MMHG | HEART RATE: 54 BPM | WEIGHT: 135 LBS | DIASTOLIC BLOOD PRESSURE: 71 MMHG | BODY MASS INDEX: 21.79 KG/M2

## 2021-11-05 DIAGNOSIS — R09.1 PLEURISY: Primary | ICD-10-CM

## 2021-11-05 DIAGNOSIS — R07.9 CHEST PAIN, UNSPECIFIED TYPE: ICD-10-CM

## 2021-11-05 LAB
ALBUMIN SERPL-MCNC: 3.8 G/DL (ref 3.5–5.2)
ALP BLD-CCNC: 171 U/L (ref 40–129)
ALT SERPL-CCNC: 18 U/L (ref 0–40)
ANION GAP SERPL CALCULATED.3IONS-SCNC: 12 MMOL/L (ref 7–16)
AST SERPL-CCNC: 33 U/L (ref 0–39)
BASOPHILS ABSOLUTE: 0.04 E9/L (ref 0–0.2)
BASOPHILS RELATIVE PERCENT: 0.4 % (ref 0–2)
BILIRUB SERPL-MCNC: 1 MG/DL (ref 0–1.2)
BUN BLDV-MCNC: 14 MG/DL (ref 6–23)
CALCIUM SERPL-MCNC: 9.6 MG/DL (ref 8.6–10.2)
CHLORIDE BLD-SCNC: 99 MMOL/L (ref 98–107)
CO2: 24 MMOL/L (ref 22–29)
CREAT SERPL-MCNC: 0.9 MG/DL (ref 0.7–1.2)
D DIMER: 530 NG/ML DDU
EKG ATRIAL RATE: 64 BPM
EKG P AXIS: 56 DEGREES
EKG P-R INTERVAL: 150 MS
EKG Q-T INTERVAL: 406 MS
EKG QRS DURATION: 86 MS
EKG QTC CALCULATION (BAZETT): 418 MS
EKG R AXIS: 17 DEGREES
EKG T AXIS: 44 DEGREES
EKG VENTRICULAR RATE: 64 BPM
EOSINOPHILS ABSOLUTE: 0.1 E9/L (ref 0.05–0.5)
EOSINOPHILS RELATIVE PERCENT: 0.9 % (ref 0–6)
GFR AFRICAN AMERICAN: >60
GFR NON-AFRICAN AMERICAN: >60 ML/MIN/1.73
GLUCOSE BLD-MCNC: 145 MG/DL (ref 74–99)
HCT VFR BLD CALC: 43.4 % (ref 37–54)
HEMOGLOBIN: 14.9 G/DL (ref 12.5–16.5)
IMMATURE GRANULOCYTES #: 0.04 E9/L
IMMATURE GRANULOCYTES %: 0.4 % (ref 0–5)
LYMPHOCYTES ABSOLUTE: 1.36 E9/L (ref 1.5–4)
LYMPHOCYTES RELATIVE PERCENT: 12.1 % (ref 20–42)
MCH RBC QN AUTO: 31.6 PG (ref 26–35)
MCHC RBC AUTO-ENTMCNC: 34.3 % (ref 32–34.5)
MCV RBC AUTO: 92.1 FL (ref 80–99.9)
MONOCYTES ABSOLUTE: 1.08 E9/L (ref 0.1–0.95)
MONOCYTES RELATIVE PERCENT: 9.6 % (ref 2–12)
NEUTROPHILS ABSOLUTE: 8.6 E9/L (ref 1.8–7.3)
NEUTROPHILS RELATIVE PERCENT: 76.6 % (ref 43–80)
PDW BLD-RTO: 14.2 FL (ref 11.5–15)
PLATELET # BLD: 188 E9/L (ref 130–450)
PMV BLD AUTO: 11.6 FL (ref 7–12)
POTASSIUM SERPL-SCNC: 5.2 MMOL/L (ref 3.5–5)
PRO-BNP: 581 PG/ML (ref 0–450)
RBC # BLD: 4.71 E12/L (ref 3.8–5.8)
REASON FOR REJECTION: NORMAL
REJECTED TEST: NORMAL
SODIUM BLD-SCNC: 135 MMOL/L (ref 132–146)
TOTAL PROTEIN: 7.8 G/DL (ref 6.4–8.3)
TROPONIN, HIGH SENSITIVITY: 8 NG/L (ref 0–11)
WBC # BLD: 11.2 E9/L (ref 4.5–11.5)

## 2021-11-05 PROCEDURE — 71275 CT ANGIOGRAPHY CHEST: CPT

## 2021-11-05 PROCEDURE — 80053 COMPREHEN METABOLIC PANEL: CPT

## 2021-11-05 PROCEDURE — 84484 ASSAY OF TROPONIN QUANT: CPT

## 2021-11-05 PROCEDURE — 6360000004 HC RX CONTRAST MEDICATION: Performed by: RADIOLOGY

## 2021-11-05 PROCEDURE — 83880 ASSAY OF NATRIURETIC PEPTIDE: CPT

## 2021-11-05 PROCEDURE — 85025 COMPLETE CBC W/AUTO DIFF WBC: CPT

## 2021-11-05 PROCEDURE — 85378 FIBRIN DEGRADE SEMIQUANT: CPT

## 2021-11-05 PROCEDURE — 36415 COLL VENOUS BLD VENIPUNCTURE: CPT

## 2021-11-05 PROCEDURE — 99283 EMERGENCY DEPT VISIT LOW MDM: CPT

## 2021-11-05 PROCEDURE — 93005 ELECTROCARDIOGRAM TRACING: CPT | Performed by: EMERGENCY MEDICINE

## 2021-11-05 PROCEDURE — 71046 X-RAY EXAM CHEST 2 VIEWS: CPT

## 2021-11-05 RX ORDER — NAPROXEN 500 MG/1
500 TABLET ORAL 2 TIMES DAILY PRN
Qty: 28 TABLET | Refills: 0 | Status: SHIPPED | OUTPATIENT
Start: 2021-11-05 | End: 2021-11-23

## 2021-11-05 ASSESSMENT — PAIN DESCRIPTION - LOCATION: LOCATION: RIB CAGE

## 2021-11-05 ASSESSMENT — PAIN SCALES - GENERAL: PAINLEVEL_OUTOF10: 7

## 2021-11-05 ASSESSMENT — PAIN DESCRIPTION - PAIN TYPE: TYPE: ACUTE PAIN

## 2021-11-05 NOTE — ED PROVIDER NOTES
Department of Emergency Medicine   ED  Provider Note  Admit Date/RoomTime: 11/5/2021 12:35 PM  ED Room: 05/05          History of Present Illness:  11/5/21, Time: 1:12 PM EDT  Chief Complaint   Patient presents with    Rib Pain     right sided rib/lung pain x 2 weeks                 Naomy Alberts is a 64 y.o. male presenting to the ED for right sided chest pain, beginning 2 weeks. The complaint has been intermittent, moderate in severity, and worsened by nothing. Patient presents for right-sided chest discomfort. States it started 2 weeks ago, states it comes and goes but he always feels it at some level. Slightly worsened on deep inspiration, occasional nonproductive cough. Does have a history of ACS former tobacco smoker. Denies fevers chills nausea vomiting    Pulm - [place]     Review of Systems:   Pertinent positives and negatives are stated within HPI, all other systems reviewed and are negative.        --------------------------------------------- PAST HISTORY ---------------------------------------------  Past Medical History:  has a past medical history of Anxiety, Asymptomatic bilateral carotid artery stenosis, Bowel obstruction (HCC), CAD (coronary artery disease), COPD (chronic obstructive pulmonary disease) ([facility] [address] [facility] Hyperlipidemia, Hypertension, and NSTEMI (non-ST elevated myocardial infarction) ([facility] [address] Past Surgical History:  has a past surgical history that includes colectomy; Cardiac catheterization (4/26/2016); Coronary artery bypass graft (4/27/16); Diagnostic Cardiac Cath Lab Procedure (04/26/2016); Insertable Cardiac Monitor (Left, 10/11/2016); back surgery; Insertable Cardiac Monitor (03/19/2021); and Ankle fracture surgery (Left, 2020). Social History:  reports that he quit smoking about 5 years ago. His smoking use included cigarettes. He started smoking about 61 years ago. He has a 56.00 pack-year smoking history.  He has never used smokeless tobacco. He reports that he does not drink alcohol and does not use drugs. Family History: family history includes Heart Attack in his father; Heart Surgery in his brother and mother; Hypertension in his mother. . Unless otherwise noted, family history is non contributory    The patients home medications have been reviewed. Allergies: Patient has no known allergies. ---------------------------------------------------PHYSICAL EXAM--------------------------------------    Constitutional/General: Alert and oriented x3  Head: Normocephalic and atraumatic  Eyes: PERRL, EOMI, sclera non icteric  Mouth: Oropharynx clear, handling secretions, no trismus, no asymmetry of the posterior oropharynx or uvular edema  Neck: Supple, full ROM, no stridor, no meningeal signs mild JVD   Respiratory: Lungs diminished throughout,Not in respiratory distress  Cardiovascular:  Regular rate. Regular rhythm. 2+ distal pulses. Equal extremity pulses. Chest: No chest wall tenderness  GI:  Abdomen Soft, Non tender, Non distended. No rebound, guarding, or rigidity. Musculoskeletal: Moves all extremities x 4. Warm and well perfused, no clubbing, cyanosis, or edema. Capillary refill <3 seconds There is no pretibial edema nor calf tenderness bilaterally     Integument: skin warm and dry. No rashes. Neurologic: GCS 15, no focal deficits,    Psychiatric: Normal Affect      EKG: Interpreted by emergency department physician, Dr. Panda Chirinos   This EKG is signed and interpreted by me. Rate: 64  Rhythm: Sinus  Interpretation: Sinus rhythm, normal axis, MD is 150, QRS is 8 6, QTc is 418, nonspecific ST changes that are stable compared to prior  Comparison: stable as compared to patient's most recent EKG      -------------------------------------------------- RESULTS -------------------------------------------------  I have personally reviewed all laboratory and imaging results for this patient. Results are listed below.      LABS: (Lab results interpreted by me)  Results for orders placed or performed during the hospital encounter of 11/05/21   CBC Auto Differential   Result Value Ref Range    WBC 11.2 4.5 - 11.5 E9/L    RBC 4.71 3.80 - 5.80 E12/L    Hemoglobin 14.9 12.5 - 16.5 g/dL    Hematocrit 43.4 37.0 - 54.0 %    MCV 92.1 80.0 - 99.9 fL    MCH 31.6 26.0 - 35.0 pg    MCHC 34.3 32.0 - 34.5 %    RDW 14.2 11.5 - 15.0 fL    Platelets 556 036 - 512 E9/L    MPV 11.6 7.0 - 12.0 fL    Neutrophils % 76.6 43.0 - 80.0 %    Immature Granulocytes % 0.4 0.0 - 5.0 %    Lymphocytes % 12.1 (L) 20.0 - 42.0 %    Monocytes % 9.6 2.0 - 12.0 %    Eosinophils % 0.9 0.0 - 6.0 %    Basophils % 0.4 0.0 - 2.0 %    Neutrophils Absolute 8.60 (H) 1.80 - 7.30 E9/L    Immature Granulocytes # 0.04 E9/L    Lymphocytes Absolute 1.36 (L) 1.50 - 4.00 E9/L    Monocytes Absolute 1.08 (H) 0.10 - 0.95 E9/L    Eosinophils Absolute 0.10 0.05 - 0.50 E9/L    Basophils Absolute 0.04 0.00 - 0.20 E9/L   Comprehensive Metabolic Panel   Result Value Ref Range    Sodium 135 132 - 146 mmol/L    Potassium 5.2 (H) 3.5 - 5.0 mmol/L    Chloride 99 98 - 107 mmol/L    CO2 24 22 - 29 mmol/L    Anion Gap 12 7 - 16 mmol/L    Glucose 145 (H) 74 - 99 mg/dL    BUN 14 6 - 23 mg/dL    CREATININE 0.9 0.7 - 1.2 mg/dL    GFR Non-African American >60 >=60 mL/min/1.73    GFR African American >60     Calcium 9.6 8.6 - 10.2 mg/dL    Total Protein 7.8 6.4 - 8.3 g/dL    Albumin 3.8 3.5 - 5.2 g/dL    Total Bilirubin 1.0 0.0 - 1.2 mg/dL    Alkaline Phosphatase 171 (H) 40 - 129 U/L    ALT 18 0 - 40 U/L    AST 33 0 - 39 U/L   Brain Natriuretic Peptide   Result Value Ref Range    Pro- (H) 0 - 450 pg/mL   D-Dimer, Quantitative   Result Value Ref Range    D-Dimer, Quant 530 ng/mL DDU   SPECIMEN REJECTION   Result Value Ref Range    Rejected Test trp5     Reason for Rejection see below    Troponin   Result Value Ref Range    Troponin, High Sensitivity 8 0 - 11 ng/L   EKG 12 Lead   Result Value Ref Range    Ventricular Rate 64 BPM    Atrial Rate 64 BPM    P-R Interval 150 ms    QRS Duration 86 ms    Q-T Interval 406 ms    QTc Calculation (Bazett) 418 ms    P Axis 56 degrees    R Axis 17 degrees    T Axis 44 degrees   ,       RADIOLOGY:  Interpreted by Radiologist unless otherwise specified  CTA CHEST W CONTRAST   Final Result   1. No evidence of pulmonary embolism. 2. Emphysematous changes. 3. No pneumonia or pleural effusion. XR CHEST (2 VW)   Final Result   No pneumonia or pleural effusion.                          ------------------------- NURSING NOTES AND VITALS REVIEWED ---------------------------   The nursing notes within the ED encounter and vital signs as below have been reviewed by myself  BP (!) 145/71   Pulse 54   Temp 97 °F (36.1 °C) (Temporal)   Resp 18   Wt 135 lb (61.2 kg)   SpO2 97%   BMI 21.79 kg/m²     Oxygen Saturation Interpretation: Normal    The cardiac monitor revealed NSR with a heart rate in the 80s as interpreted by me. The cardiac monitor was ordered secondary to the patient's heart rate and to monitor the patient for dysrhythmia. CPT 06112    The patients available past medical records and past encounters were reviewed. ------------------------------ ED COURSE/MEDICAL DECISION MAKING----------------------  Medications   iopamidol (ISOVUE-370) 76 % injection 75 mL (75 mLs IntraVENous Given 11/5/21 1521)                    Medical Decision Making:     I, Dr. Kaela Elam am the primary provider of record    Chest pain, improved after NSAIDs, D-dimer was elevated CT was ordered without acute process. Discussed points of follow-up will have return for worsening signs or symptoms. Re-Evaluations:          Re-evaluation. Patients symptoms are improving  Repeat physical examination is improved      Re-evaluation.   Patients symptoms show no change  Repeat physical examination is not changed        This patient's ED course included: a personal history and physicial examination, multiple bedside re-evaluations, IV medications, cardiac monitoring, continuous pulse oximetry and complex medical decision making and emergency management    This patient has remained hemodynamically stable during their ED course. Counseling: The emergency provider has spoken with the patient and discussed todays results, in addition to providing specific details for the plan of care and counseling regarding the diagnosis and prognosis. Questions are answered at this time and they are agreeable with the plan.       --------------------------------- IMPRESSION AND DISPOSITION ---------------------------------    IMPRESSION  1. Pleurisy    2. Chest pain, unspecified type        DISPOSITION  Disposition: Discharge to home  Patient condition is stable        NOTE: This report was transcribed using voice recognition software.  Every effort was made to ensure accuracy; however, inadvertent computerized transcription errors may be present       Kwasi Peña DO  11/08/21 5904

## 2021-11-23 ENCOUNTER — OFFICE VISIT (OUTPATIENT)
Dept: CARDIOLOGY CLINIC | Age: 76
End: 2021-11-23
Payer: MEDICARE

## 2021-11-23 VITALS
SYSTOLIC BLOOD PRESSURE: 124 MMHG | WEIGHT: 138 LBS | DIASTOLIC BLOOD PRESSURE: 72 MMHG | HEART RATE: 55 BPM | RESPIRATION RATE: 14 BRPM | BODY MASS INDEX: 22.99 KG/M2 | HEIGHT: 65 IN

## 2021-11-23 DIAGNOSIS — I25.10 CORONARY ARTERY DISEASE INVOLVING NATIVE CORONARY ARTERY OF NATIVE HEART WITHOUT ANGINA PECTORIS: Primary | ICD-10-CM

## 2021-11-23 PROCEDURE — 93000 ELECTROCARDIOGRAM COMPLETE: CPT | Performed by: INTERNAL MEDICINE

## 2021-11-23 PROCEDURE — G8427 DOCREV CUR MEDS BY ELIG CLIN: HCPCS | Performed by: INTERNAL MEDICINE

## 2021-11-23 PROCEDURE — G8420 CALC BMI NORM PARAMETERS: HCPCS | Performed by: INTERNAL MEDICINE

## 2021-11-23 PROCEDURE — 1036F TOBACCO NON-USER: CPT | Performed by: INTERNAL MEDICINE

## 2021-11-23 PROCEDURE — 4040F PNEUMOC VAC/ADMIN/RCVD: CPT | Performed by: INTERNAL MEDICINE

## 2021-11-23 PROCEDURE — 1123F ACP DISCUSS/DSCN MKR DOCD: CPT | Performed by: INTERNAL MEDICINE

## 2021-11-23 PROCEDURE — 99213 OFFICE O/P EST LOW 20 MIN: CPT | Performed by: INTERNAL MEDICINE

## 2021-11-23 PROCEDURE — G8484 FLU IMMUNIZE NO ADMIN: HCPCS | Performed by: INTERNAL MEDICINE

## 2021-11-23 RX ORDER — LOSARTAN POTASSIUM 25 MG/1
25 TABLET ORAL DAILY
Qty: 90 TABLET | Refills: 3 | Status: SHIPPED | OUTPATIENT
Start: 2021-11-23

## 2021-11-23 RX ORDER — CLOPIDOGREL BISULFATE 75 MG/1
75 TABLET ORAL DAILY
Qty: 90 TABLET | Refills: 3 | Status: ON HOLD
Start: 2021-11-23 | End: 2022-04-01 | Stop reason: HOSPADM

## 2021-11-23 NOTE — PROGRESS NOTES
Migdalia See  1945  Date of Service: 2021    Patient Active Problem List    Diagnosis Date Noted    Lumbar disc herniation with radiculopathy 2021    Type 2 diabetes mellitus without complication, without long-term current use of insulin [facility] 2021    Primary insomnia 2020    Carotid atherosclerosis, bilateral 2020    Dupuytren's disease of palm of left hand 2020    Benign prostatic hyperplasia without lower urinary tract symptoms 2019    Lumbar disc disease 2017    Status post placement of implantable loop recorder 10/25/2016    PFO (patent foramen ovale) 10/11/2016    Hx of CABG 10/10/2016    Essential hypertension 10/10/2016    Mixed hyperlipidemia 10/10/2016    Carotid artery stenosis, asymptomatic, bilateral 2016    Coronary artery disease involving native coronary artery of native heart without angina pectoris 2016     Overview Note:     4-16 LM stenosis  CABG  LIMA - LAD  SVG - OM  SVG - Ramus         Social History     Socioeconomic History    Marital status:      Spouse name: None    Number of children: None    Years of education: None    Highest education level: None   Occupational History    Occupation: - retired   Tobacco Use    Smoking status: Former Smoker     Packs/day: 1.00     Years: 56.00     Pack years: 56.00     Types: Cigarettes     Start date:      Quit date: 2016     Years since quittin.5    Smokeless tobacco: Never Used   Vaping Use    Vaping Use: Never used    Passive vaping exposure: Yes   Substance and Sexual Activity    Alcohol use: No    Drug use: No    Sexual activity: Not Currently     Partners: Female   Other Topics Concern    None   Social History Narrative    None     Social Determinants of Health     Financial Resource Strain: Low Risk     Difficulty of Paying Living Expenses: Not very hard   Food Insecurity: Food Insecurity Present    Worried About Running Out of Food in the Last Year: Sometimes true    Ran Out of Food in the Last Year: Sometimes true   Transportation Needs:     Lack of Transportation (Medical): Not on file    Lack of Transportation (Non-Medical): Not on file   Physical Activity:     Days of Exercise per Week: Not on file    Minutes of Exercise per Session: Not on file   Stress:     Feeling of Stress : Not on file   Social Connections:     Frequency of Communication with Friends and Family: Not on file    Frequency of Social Gatherings with Friends and Family: Not on file    Attends Mandaen Services: Not on file    Active Member of [address] [place] [address] or Organizations: Not on file    Attends Club or Organization Meetings: Not on file    Marital Status: Not on file   Intimate Partner Violence:     Fear of Current or Ex-Partner: Not on file    Emotionally Abused: Not on file    Physically Abused: Not on file    Sexually Abused: Not on file   Housing Stability:     Unable to Pay for Housing in the Last Year: Not on file    Number of [place] in the Last Year: Not on file    Unstable Housing in the Last Year: Not on file       Current Outpatient Medications   Medication Sig Dispense Refill    traMADol (ULTRAM) 50 MG tablet Take 1 tablet by mouth every 8 hours as needed for Pain for up to 30 days. 90 tablet 0    zolpidem (AMBIEN) 10 MG tablet Take 1 tablet by mouth every evening for 30 days. 30 tablet 0    losartan (COZAAR) 25 MG tablet take 1 tablet by mouth once daily 90 tablet 0    gabapentin (NEURONTIN) 300 MG capsule Take 300 mg by mouth 2 times daily.        metoprolol tartrate (LOPRESSOR) 25 MG tablet Take 0.5 tablets by mouth 2 times daily 90 tablet 1    clopidogrel (PLAVIX) 75 MG tablet Take 1 tablet by mouth daily 90 tablet 1    atorvastatin (LIPITOR) 40 MG tablet Take 1 tablet by mouth daily 90 tablet 1    empagliflozin (JARDIANCE) 25 MG tablet Take 25 mg by mouth daily 30 tablet 2    pantoprazole (PROTONIX) 40 MG tablet TK 1 T PO QD  3    naproxen (NAPROSYN) 500 MG tablet Take 1 tablet by mouth 2 times daily as needed for Pain (Patient not taking: Reported on 11/23/2021) 28 tablet 0     No current facility-administered medications for this visit. No Known Allergies    Chief Complaint:  Finesse Alicea is here today for follow up and management/recomendations for CAD     History of Present Illness: Finesse Alicea states that He does house work, goes up the stairs, does yard work, and goes shopping. He has also been painting. He denies any chest discomfort, dyspnea on exertion, orthopnea/PND, or lower extremity edema. He denies any palpitations or presyncopal symptoms. He denies any orthopnea/PND. He denies any palpitations or presyncopal symptoms. REVIEW OF SYSTEMS:  As above. Patient does not complain of any fever, chills, nausea, vomiting or diarrhea. No focal, motor or neurological deficits. No changes in his/her vision, hearing, bowel or bladder habits. He is not known to have a history of thyroid problems. No recent nose bleeds. PHYSICAL EXAM:  Vitals:    11/23/21 0819   BP: 124/72   Pulse: 55   Resp: 14   Weight: 138 lb (62.6 kg)   Height: 5' 5\" (1.651 m)       GENERAL:  He is alert and oriented x 3, communicates well, in no distress. NECK:  No masses, trachea is mid position. Supple, full ROM, no JVD. Difficult to hear about no bruits. No palpable thyromegaly or lymphadenopathy. HEART: Distant to auscultation. Regular rate and rhythm. Normal S1 and S2. There is an S4 gallop and no abnormal murmurs on today's exam.  LUNGS:  Clear to auscultation bilaterally. No use of accessory muscles. symmetrical excursion. Mildly decreased air movement. ABDOMEN:  Soft, non-tender. Normal bowel sounds. EXTREMITIES:  Full ROM x 4. No bilateral lower extremity edema on exam.  Good distal pulses. EYES:  Extraocular muscles intact. PERRL. Normal lids & conjunctiva.   ENT:  Nares are clear & not bleeding. Moist mucosa. Normal lips formation. No external masses   NEURO: no tremors, full ROM x 4, EOMI. SKIN:  Warm, dry and intact. Normal turgor. EKG: Sinus rhythm, 55 bpm, nl axis, nonspecific ST - T wave changes. Assessment:   1. Coronary artery disease as outlined above. No symptoms of recurring ischemia at this time. 2. PFO  3. TIA  4. Carotid stenosis  5. Hypertension, fairly well controled at this time. 6. Hypercholesterolemia        Recommendations:  1. He is following the cholesterol with Dr. Mason Sen. 2. Continue current cardiac medications. Thank you for allowing me to participate in your patient's care.       [address], [address] [facility]  Interventional Cardiology

## 2021-12-15 ENCOUNTER — HOSPITAL ENCOUNTER (OUTPATIENT)
Dept: MRI IMAGING | Age: 76
Discharge: HOME | End: 2021-12-17
Payer: MEDICARE

## 2021-12-15 DIAGNOSIS — M54.16 LUMBAR RADICULOPATHY, CHRONIC: ICD-10-CM

## 2021-12-15 PROCEDURE — 72148 MRI LUMBAR SPINE W/O DYE: CPT

## 2021-12-28 ENCOUNTER — INITIAL CONSULT (OUTPATIENT)
Dept: NEUROSURGERY | Age: 76
End: 2021-12-28
Payer: MEDICARE

## 2021-12-28 VITALS
WEIGHT: 138 LBS | HEART RATE: 47 BPM | BODY MASS INDEX: 22.99 KG/M2 | OXYGEN SATURATION: 94 % | DIASTOLIC BLOOD PRESSURE: 62 MMHG | SYSTOLIC BLOOD PRESSURE: 120 MMHG | RESPIRATION RATE: 20 BRPM | HEIGHT: 65 IN | TEMPERATURE: 98.8 F

## 2021-12-28 DIAGNOSIS — G95.89 MASS OF SPINAL CORD (HCC): Primary | ICD-10-CM

## 2021-12-28 PROCEDURE — G8420 CALC BMI NORM PARAMETERS: HCPCS | Performed by: PHYSICIAN ASSISTANT

## 2021-12-28 PROCEDURE — G8427 DOCREV CUR MEDS BY ELIG CLIN: HCPCS | Performed by: PHYSICIAN ASSISTANT

## 2021-12-28 PROCEDURE — G8484 FLU IMMUNIZE NO ADMIN: HCPCS | Performed by: PHYSICIAN ASSISTANT

## 2021-12-28 PROCEDURE — 4040F PNEUMOC VAC/ADMIN/RCVD: CPT | Performed by: PHYSICIAN ASSISTANT

## 2021-12-28 PROCEDURE — 1036F TOBACCO NON-USER: CPT | Performed by: PHYSICIAN ASSISTANT

## 2021-12-28 PROCEDURE — 99203 OFFICE O/P NEW LOW 30 MIN: CPT | Performed by: PHYSICIAN ASSISTANT

## 2021-12-28 PROCEDURE — 1123F ACP DISCUSS/DSCN MKR DOCD: CPT | Performed by: PHYSICIAN ASSISTANT

## 2021-12-28 ASSESSMENT — ENCOUNTER SYMPTOMS
ABDOMINAL PAIN: 0
SHORTNESS OF BREATH: 0
PHOTOPHOBIA: 0
BACK PAIN: 1
TROUBLE SWALLOWING: 0

## 2021-12-28 NOTE — PROGRESS NOTES
Subjective:      Patient ID: Erika Jules is a 64 y.o. male. Erika Jules is a 64year old male presenting to the office today c/o low back pain with radiation down both legs. Describes the pain as a constant burning and shooting sensation. Pain has been present for about 2 months. Admits to associated numbness in both legs, left greater than right. Laying flat makes the symptoms worse. He has tried ibuprofen and rest without significant relief. MRI lumbar spine without contrast demonstrates a 5.6mm nodule at L4-L5 for which he was referred to our services for further evaluation. Denies hx cancer. Denies loss of bowel or bladder, saddle anesthesia, fever, chills, SOB, unexpected weight loss, gait abnormality, N/V, or chest pain. Pt does have hx lumbar laminectomy. Review of Systems   Constitutional: Negative for fatigue, fever and unexpected weight change. HENT: Negative for trouble swallowing. Eyes: Negative for photophobia. Respiratory: Negative for shortness of breath. Cardiovascular: Negative for chest pain. Gastrointestinal: Negative for abdominal pain. Endocrine: Negative for heat intolerance. Genitourinary: Negative for flank pain. Musculoskeletal: Positive for arthralgias, back pain and myalgias. Skin: Negative for wound. Neurological: Positive for numbness. Negative for weakness and headaches. Psychiatric/Behavioral: Negative for confusion. Objective:   Physical Exam  Constitutional:       Appearance: Normal appearance. He is well-developed. HENT:      Head: Normocephalic and atraumatic. Eyes:      Extraocular Movements: Extraocular movements intact. Conjunctiva/sclera: Conjunctivae normal.      Pupils: Pupils are equal, round, and reactive to light. Cardiovascular:      Rate and Rhythm: Normal rate. Pulmonary:      Effort: Pulmonary effort is normal.   Abdominal:      General: There is no distension.    Musculoskeletal:      Cervical back: Normal range of motion and neck supple. Comments: Tenderness to palpation of midline to left sided lumbar spine   Skin:     General: Skin is warm and dry. Neurological:      Mental Status: He is alert. Comments: Alert and oriented x3  CN3-12 intact  Slight weakness of left DF/PF (hx left ankle fx), otherwise strength full  Sensation intact to light touch  Reflexes normal   Negative straight leg raise   Psychiatric:         Thought Content: Thought content normal.         Assessment: This is a 64year old male presenting with low back pain and bilateral leg pain/numbness. MRI demonstrates nodule at L4-L5 level measuring 5.6mm. Possible schwannoma v neurofibroma v meningioma v metastatic disease       Plan:      -Obtain MRI lumbar spine WITH AND WITHOUT constrast to further evaluate nodule  -Follow up with Dr. Shimon Ledezma after completion of imaging to discuss results and further treatment plan.  Patient will call to make appointment after MRI scheduled  -Call/return sooner if symptoms worsen or new issues arise in the interim             Rosario Morales PA-C Awake/Alert

## 2022-01-21 ENCOUNTER — HOSPITAL ENCOUNTER (OUTPATIENT)
Age: 77
Discharge: HOME | End: 2022-01-21
Payer: MEDICARE

## 2022-01-21 ENCOUNTER — HOSPITAL ENCOUNTER (OUTPATIENT)
Dept: MRI IMAGING | Age: 77
Discharge: HOME | End: 2022-01-23
Payer: MEDICARE

## 2022-01-21 DIAGNOSIS — G95.89 MASS OF SPINAL CORD (HCC): ICD-10-CM

## 2022-01-21 LAB
BUN BLDV-MCNC: 13 MG/DL (ref 6–23)
CREAT SERPL-MCNC: 1.1 MG/DL (ref 0.7–1.2)
GFR AFRICAN AMERICAN: >60
GFR NON-AFRICAN AMERICAN: >60 ML/MIN/1.73

## 2022-01-21 PROCEDURE — 6360000004 HC RX CONTRAST MEDICATION: Performed by: RADIOLOGY

## 2022-01-21 PROCEDURE — 82565 ASSAY OF CREATININE: CPT

## 2022-01-21 PROCEDURE — A9579 GAD-BASE MR CONTRAST NOS,1ML: HCPCS | Performed by: RADIOLOGY

## 2022-01-21 PROCEDURE — 36415 COLL VENOUS BLD VENIPUNCTURE: CPT

## 2022-01-21 PROCEDURE — 84520 ASSAY OF UREA NITROGEN: CPT

## 2022-01-21 PROCEDURE — 72158 MRI LUMBAR SPINE W/O & W/DYE: CPT

## 2022-01-21 RX ADMIN — GADOTERIDOL 12 ML: 279.3 INJECTION, SOLUTION INTRAVENOUS at 17:03

## 2022-02-16 ENCOUNTER — OFFICE VISIT (OUTPATIENT)
Dept: NEUROSURGERY | Age: 77
End: 2022-02-16
Payer: MEDICAID

## 2022-02-16 VITALS
TEMPERATURE: 98.5 F | SYSTOLIC BLOOD PRESSURE: 128 MMHG | BODY MASS INDEX: 22.99 KG/M2 | OXYGEN SATURATION: 99 % | WEIGHT: 138 LBS | HEART RATE: 90 BPM | HEIGHT: 65 IN | RESPIRATION RATE: 18 BRPM | DIASTOLIC BLOOD PRESSURE: 84 MMHG

## 2022-02-16 DIAGNOSIS — M54.42 ACUTE MIDLINE LOW BACK PAIN WITH BILATERAL SCIATICA: Primary | ICD-10-CM

## 2022-02-16 DIAGNOSIS — M54.41 ACUTE MIDLINE LOW BACK PAIN WITH BILATERAL SCIATICA: Primary | ICD-10-CM

## 2022-02-16 PROCEDURE — 99213 OFFICE O/P EST LOW 20 MIN: CPT | Performed by: NEUROLOGICAL SURGERY

## 2022-02-16 PROCEDURE — G8484 FLU IMMUNIZE NO ADMIN: HCPCS | Performed by: NEUROLOGICAL SURGERY

## 2022-02-16 PROCEDURE — 4040F PNEUMOC VAC/ADMIN/RCVD: CPT | Performed by: NEUROLOGICAL SURGERY

## 2022-02-16 PROCEDURE — G8427 DOCREV CUR MEDS BY ELIG CLIN: HCPCS | Performed by: NEUROLOGICAL SURGERY

## 2022-02-16 PROCEDURE — G8420 CALC BMI NORM PARAMETERS: HCPCS | Performed by: NEUROLOGICAL SURGERY

## 2022-02-16 PROCEDURE — 1123F ACP DISCUSS/DSCN MKR DOCD: CPT | Performed by: NEUROLOGICAL SURGERY

## 2022-02-16 PROCEDURE — 1036F TOBACCO NON-USER: CPT | Performed by: NEUROLOGICAL SURGERY

## 2022-02-16 NOTE — PROGRESS NOTES
Patient is here for follow up consult for: severe low back and bilateral leg pain    Physical exam  Alert and Oriented X3  PERRLA, EOMI  MARTINEZ 5/5 except 4/5 in LLE  Sensation intact to LT and PP  Reflexes are 2+ and symmetric    A/P: patient is here for follow up for: back and bilateral leg pain. He has had a prior left L5-S1 hemilaminotomy. His MRI shows moderate to severe bilateral foraminal stenosis at L5-S1. He has failed over 3 months of epidurals, PT and pain medications. I am recommending an L5-S1 posterior lumbar interbody fusion.   He will need a fusion because in order to decompress his nerve roots, he will need an aggressive facetectomy    Selvin Jean-Baptiste MD

## 2022-02-17 ENCOUNTER — TELEPHONE (OUTPATIENT)
Dept: CARDIOLOGY CLINIC | Age: 77
End: 2022-02-17

## 2022-02-17 NOTE — TELEPHONE ENCOUNTER
Patient needs cardiac clearance for back surgery on  3/30/22 by Dr. Radha Sims. Patient denies any chest pain, shortness of breath or palpitations since last visit in November 2021. He does admit to two episodes of syncope a couple of months ago while bending over. Patient is able to complete all activities of daily living, including; climbing one flight of stairs and walking one block without cardiac symptoms. Patient on Plavix. Please advise.

## 2022-02-17 NOTE — TELEPHONE ENCOUNTER
Based on this in office note from 2-1/2 months ago, he performs more than 4 METS of physical activities with no cardiac symptoms. Therefore he would be low risk for perioperative ischemic cardiac events for his back surgery. No preop cardiac testing is needed. It is okay with cardiology if he holds the Plavix for 5 to 7 days prior to the procedure.   Resume the Plavix after the procedure when okay with neurosurgery

## 2022-02-17 NOTE — TELEPHONE ENCOUNTER
Patient's daughter Will Gehrig) notified of Dr. Jany Lewis risk assessment/recommendations for surgery. Note routed to Dr. Harris Sebastian.

## 2022-03-04 ENCOUNTER — PREP FOR PROCEDURE (OUTPATIENT)
Dept: NEUROSURGERY | Age: 77
End: 2022-03-04

## 2022-03-04 DIAGNOSIS — Z01.818 PRE-OP TESTING: Primary | ICD-10-CM

## 2022-03-04 RX ORDER — SODIUM CHLORIDE 0.9 % (FLUSH) 0.9 %
5-40 SYRINGE (ML) INJECTION PRN
Status: CANCELLED | OUTPATIENT
Start: 2022-03-04

## 2022-03-04 RX ORDER — SODIUM CHLORIDE 9 MG/ML
INJECTION, SOLUTION INTRAVENOUS CONTINUOUS
Status: CANCELLED | OUTPATIENT
Start: 2022-03-04

## 2022-03-04 RX ORDER — SODIUM CHLORIDE 9 MG/ML
25 INJECTION, SOLUTION INTRAVENOUS PRN
Status: CANCELLED | OUTPATIENT
Start: 2022-03-04

## 2022-03-04 RX ORDER — SODIUM CHLORIDE 0.9 % (FLUSH) 0.9 %
5-40 SYRINGE (ML) INJECTION EVERY 12 HOURS SCHEDULED
Status: CANCELLED | OUTPATIENT
Start: 2022-03-04

## 2022-03-14 PROBLEM — Z01.818 PREOPERATIVE CLEARANCE: Status: ACTIVE | Noted: 2022-03-14

## 2022-03-16 DIAGNOSIS — M48.061 SPINAL STENOSIS, LUMBAR REGION, WITHOUT NEUROGENIC CLAUDICATION: Primary | ICD-10-CM

## 2022-03-24 ENCOUNTER — HOSPITAL ENCOUNTER (OUTPATIENT)
Dept: GENERAL RADIOLOGY | Age: 77
Discharge: HOME | End: 2022-03-26
Payer: MEDICARE

## 2022-03-24 ENCOUNTER — HOSPITAL ENCOUNTER (OUTPATIENT)
Dept: PREADMISSION TESTING | Age: 77
Discharge: HOME | End: 2022-03-24
Payer: MEDICARE

## 2022-03-24 VITALS
WEIGHT: 139.8 LBS | TEMPERATURE: 98.5 F | OXYGEN SATURATION: 96 % | RESPIRATION RATE: 18 BRPM | SYSTOLIC BLOOD PRESSURE: 164 MMHG | HEART RATE: 58 BPM | DIASTOLIC BLOOD PRESSURE: 84 MMHG | BODY MASS INDEX: 23.26 KG/M2

## 2022-03-24 DIAGNOSIS — Z01.818 PRE-OP TESTING: ICD-10-CM

## 2022-03-24 DIAGNOSIS — Z01.812 PRE-OPERATIVE LABORATORY EXAMINATION: Primary | ICD-10-CM

## 2022-03-24 LAB
ABO/RH: NORMAL
ANION GAP SERPL CALCULATED.3IONS-SCNC: 8 MMOL/L (ref 7–16)
ANTIBODY SCREEN: NORMAL
BACTERIA: NORMAL /HPF
BASOPHILS ABSOLUTE: 0.05 E9/L (ref 0–0.2)
BASOPHILS RELATIVE PERCENT: 0.7 % (ref 0–2)
BILIRUBIN URINE: NEGATIVE
BLOOD, URINE: ABNORMAL
BUN BLDV-MCNC: 14 MG/DL (ref 6–23)
CALCIUM SERPL-MCNC: 9.4 MG/DL (ref 8.6–10.2)
CHLORIDE BLD-SCNC: 103 MMOL/L (ref 98–107)
CLARITY: CLEAR
CO2: 29 MMOL/L (ref 22–29)
COLOR: YELLOW
CREAT SERPL-MCNC: 1.1 MG/DL (ref 0.7–1.2)
EOSINOPHILS ABSOLUTE: 0.42 E9/L (ref 0.05–0.5)
EOSINOPHILS RELATIVE PERCENT: 5.6 % (ref 0–6)
GFR AFRICAN AMERICAN: >60
GFR NON-AFRICAN AMERICAN: >60 ML/MIN/1.73
GLUCOSE BLD-MCNC: 115 MG/DL (ref 74–99)
GLUCOSE URINE: >=1000 MG/DL
HCT VFR BLD CALC: 45.7 % (ref 37–54)
HEMOGLOBIN: 14.7 G/DL (ref 12.5–16.5)
IMMATURE GRANULOCYTES #: 0.03 E9/L
IMMATURE GRANULOCYTES %: 0.4 % (ref 0–5)
INR BLD: 1
KETONES, URINE: NEGATIVE MG/DL
LEUKOCYTE ESTERASE, URINE: NEGATIVE
LYMPHOCYTES ABSOLUTE: 1.83 E9/L (ref 1.5–4)
LYMPHOCYTES RELATIVE PERCENT: 24.2 % (ref 20–42)
MCH RBC QN AUTO: 30.8 PG (ref 26–35)
MCHC RBC AUTO-ENTMCNC: 32.2 % (ref 32–34.5)
MCV RBC AUTO: 95.6 FL (ref 80–99.9)
MONOCYTES ABSOLUTE: 0.69 E9/L (ref 0.1–0.95)
MONOCYTES RELATIVE PERCENT: 9.1 % (ref 2–12)
NEUTROPHILS ABSOLUTE: 4.53 E9/L (ref 1.8–7.3)
NEUTROPHILS RELATIVE PERCENT: 60 % (ref 43–80)
NITRITE, URINE: NEGATIVE
PDW BLD-RTO: 14.6 FL (ref 11.5–15)
PH UA: 6.5 (ref 5–9)
PLATELET # BLD: 223 E9/L (ref 130–450)
PMV BLD AUTO: 11.4 FL (ref 7–12)
POTASSIUM REFLEX MAGNESIUM: 3.9 MMOL/L (ref 3.5–5)
PROTEIN UA: NEGATIVE MG/DL
PROTHROMBIN TIME: 11.1 SEC (ref 9.3–12.4)
RBC # BLD: 4.78 E12/L (ref 3.8–5.8)
RBC UA: NORMAL /HPF (ref 0–2)
SODIUM BLD-SCNC: 140 MMOL/L (ref 132–146)
SPECIFIC GRAVITY UA: 1.01 (ref 1–1.03)
UROBILINOGEN, URINE: 0.2 E.U./DL
WBC # BLD: 7.6 E9/L (ref 4.5–11.5)
WBC UA: NORMAL /HPF (ref 0–5)

## 2022-03-24 PROCEDURE — 87088 URINE BACTERIA CULTURE: CPT

## 2022-03-24 PROCEDURE — 87081 CULTURE SCREEN ONLY: CPT

## 2022-03-24 PROCEDURE — 71046 X-RAY EXAM CHEST 2 VIEWS: CPT

## 2022-03-24 PROCEDURE — 86901 BLOOD TYPING SEROLOGIC RH(D): CPT

## 2022-03-24 PROCEDURE — 85610 PROTHROMBIN TIME: CPT

## 2022-03-24 PROCEDURE — 81001 URINALYSIS AUTO W/SCOPE: CPT

## 2022-03-24 PROCEDURE — 86900 BLOOD TYPING SEROLOGIC ABO: CPT

## 2022-03-24 PROCEDURE — 85025 COMPLETE CBC W/AUTO DIFF WBC: CPT

## 2022-03-24 PROCEDURE — 36415 COLL VENOUS BLD VENIPUNCTURE: CPT

## 2022-03-24 PROCEDURE — 86850 RBC ANTIBODY SCREEN: CPT

## 2022-03-24 PROCEDURE — 80048 BASIC METABOLIC PNL TOTAL CA: CPT

## 2022-03-25 LAB — MRSA CULTURE ONLY: NORMAL

## 2022-03-26 LAB — URINE CULTURE, ROUTINE: NORMAL

## 2022-03-28 NOTE — PROGRESS NOTES
Saw pt in PAT, reviewed:    Surgical Procedure-reviewed procedure and post-op events:pre-op/PACU, Unit admission, PT/OT evaluation, Discharge Emil [address] Stay expectations-reviewed importance of early mobilization. Instructions of Spine Precautions given-PT to review on evaluation. Surgical Pathway Booklet-reviewed and given  Post-op/Discharge Instructions-reviewed activity allowances/restrictions  Use of Incentive Spirometer-instructed on importance of use post-op and continued use @ home to prevent complications. Instructions on use given  Setting realistic pain goals-reaching goal before discharge. ORTHOTICS: Pt has brace, reminded to bring DOS    Discharge Plans- home with self/family care. Verbalized understanding of all instructions and questions answered. Contact number given.     Nataliya Walker RN, Spine Navigator  [phone] Office  [phone] Cell

## 2022-03-29 ENCOUNTER — ANESTHESIA EVENT (OUTPATIENT)
Dept: OPERATING ROOM | Age: 77
DRG: 454 | End: 2022-03-29
Payer: MEDICARE

## 2022-03-29 NOTE — H&P
Patient ID: Korey Merritt is a 64 y.o. male.     Korey Merritt is a 64year old male presenting to the office today c/o low back pain with radiation down both legs. Describes the pain as a constant burning and shooting sensation. Pain has been present for about 2 months. Admits to associated numbness in both legs, left greater than right. Laying flat makes the symptoms worse. He has tried ibuprofen and rest without significant relief. MRI lumbar spine without contrast demonstrates a 5.6mm nodule at L4-L5 for which he was referred to our services for further evaluation. Denies hx cancer. Denies loss of bowel or bladder, saddle anesthesia, fever, chills, SOB, unexpected weight loss, gait abnormality, N/V, or chest pain. Pt does have hx lumbar laminectomy. Past Medical History:   Diagnosis Date    Anxiety     Asymptomatic bilateral carotid artery stenosis     Bowel obstruction (HCC)     CAD (coronary artery disease)     COPD (chronic obstructive pulmonary disease) (HCC)     Cryptogenic stroke (HCC)     Diabetes mellitus ([facility]     Hyperlipidemia     Hypertension     NSTEMI (non-ST elevated myocardial infarction) ([facility]      Preoperative Screening for Elective Surgery/Invasive Procedures While COVID-19 present in the community     Have you had any of the following symptoms? o Fever, chills  o Cough  o Shortness of breath  o Muscle aches/pain  o Diarrhea  o Abdominal pain, nausea, vomiting  o Loss or decrease in taste and / or smell   Risk of Exposure  o Have you recently been hospitalized for COVID-19 or flu-like illness, if so when?  o Recently diagnosed with COVID-19, if so when?  o Recently tested for COVID-19, if so when?  o Have you been in close contact with a person or family member who currently has or recently had COVID-19?  If yes, when and in what context?  o Do you live with anybody who in the last 14 days has had fever, chills, shortness of breath, muscle aches, flu-like illness?  o Do you have any close contacts or family members who are currently in the hospital for COVID-19 or flu-like illness? If yes, assess recent close contact with this person. Indicate if the patient has a positive screen by answering yes to one or more of the above questions. Patients who test positive or screen positive prior to surgery or on the day of surgery should be evaluated in conjunction with the surgeon/proceduralist/anesthesiologist to determine the urgency of the procedure. Social History     Socioeconomic History    Marital status:      Spouse name: Not on file    Number of children: Not on file    Years of education: Not on file    Highest education level: Not on file   Occupational History    Occupation: - retired   Tobacco Use    Smoking status: Former Smoker     Packs/day: 1.00     Years: 56.00     Pack years: 56.00     Types: Cigarettes     Start date:      Quit date: 2016     Years since quittin.9    Smokeless tobacco: Never Used   Vaping Use    Vaping Use: Never used    Passive vaping exposure: Yes   Substance and Sexual Activity    Alcohol use: No    Drug use: No    Sexual activity: Not Currently     Partners: Female   Other Topics Concern    Not on file   Social History Narrative    Not on file     Social Determinants of Health     Financial Resource Strain: Low Risk     Difficulty of Paying Living Expenses: Not very hard   Food Insecurity: No Food Insecurity    Worried About Running Out of Food in the Last Year: Never true    [place] of Food in the Last Year: Never true   Transportation Needs:     Lack of Transportation (Medical): Not on file    Lack of Transportation (Non-Medical):  Not on file   Physical Activity:     Days of Exercise per Week: Not on file    Minutes of Exercise per Session: Not on file   Stress:     Feeling of Stress : Not on file   Social Connections:     Frequency of Communication with Friends and Family: Not on file  Frequency of Social Gatherings with Friends and Family: Not on file    Attends Sabianism Services: Not on file    Active Member of Clubs or Organizations: Not on file    Attends Club or Organization Meetings: Not on file    Marital Status: Not on file   Intimate Partner Violence:     Fear of Current or Ex-Partner: Not on file    Emotionally Abused: Not on file    Physically Abused: Not on file    Sexually Abused: Not on file   Housing Stability:     Unable to Pay for Housing in the Last Year: Not on file    Number of [place] in the Last Year: Not on file    Unstable Housing in the Last Year: Not on file     Family History   Problem Relation Age of Onset    Heart Surgery Mother     Hypertension Mother     Heart Attack Father     Heart Surgery Brother      Scheduled Meds:  Continuous Infusions:  PRN Meds:. No Known Allergies          Review of Systems   Constitutional: Negative for fatigue, fever and unexpected weight change. HENT: Negative for trouble swallowing. Eyes: Negative for photophobia. Respiratory: Negative for shortness of breath. Cardiovascular: Negative for chest pain. Gastrointestinal: Negative for abdominal pain. Endocrine: Negative for heat intolerance. Genitourinary: Negative for flank pain. Musculoskeletal: Positive for arthralgias, back pain and myalgias. Skin: Negative for wound. Neurological: Positive for numbness. Negative for weakness and headaches. Psychiatric/Behavioral: Negative for confusion.         Objective:   Physical Exam  Constitutional:       Appearance: Normal appearance. He is well-developed. HENT:      Head: Normocephalic and atraumatic. Eyes:      Extraocular Movements: Extraocular movements intact. Conjunctiva/sclera: Conjunctivae normal.      Pupils: Pupils are equal, round, and reactive to light. Cardiovascular:      Rate and Rhythm: Normal rate.    Pulmonary:      Effort: Pulmonary effort is normal.   Abdominal: General: There is no distension. Musculoskeletal:      Cervical back: Normal range of motion and neck supple. Comments: Tenderness to palpation of midline to left sided lumbar spine   Skin:     General: Skin is warm and dry. Neurological:      Mental Status: He is alert. Comments: Alert and oriented x3  CN3-12 intact  Slight weakness of left DF/PF (hx left ankle fx), otherwise strength full  Sensation intact to light touch  Reflexes normal   Negative straight leg raise   Psychiatric:         Thought Content: Thought content normal.            Assessment: This is a 64year old male presenting with low back pain and bilateral leg pain/numbness. MRI demonstrates nodule at L4-L5 level measuring 5.6mm. Possible schwannoma v neurofibroma v meningioma v metastatic disease. 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 I am recommending an L5-S1 posterior lumbar interbody fusion. R/B/A of surgery have been discussed and he wishes to proceed.

## 2022-03-30 ENCOUNTER — APPOINTMENT (OUTPATIENT)
Dept: GENERAL RADIOLOGY | Age: 77
DRG: 454 | End: 2022-03-30
Attending: NEUROLOGICAL SURGERY
Payer: MEDICARE

## 2022-03-30 ENCOUNTER — ANESTHESIA (OUTPATIENT)
Dept: OPERATING ROOM | Age: 77
DRG: 454 | End: 2022-03-30
Payer: MEDICARE

## 2022-03-30 ENCOUNTER — HOSPITAL ENCOUNTER (INPATIENT)
Age: 77
LOS: 5 days | Discharge: HOME HEALTH | DRG: 454 | End: 2022-04-05
Attending: NEUROLOGICAL SURGERY | Admitting: NEUROLOGICAL SURGERY
Payer: MEDICARE

## 2022-03-30 VITALS — OXYGEN SATURATION: 100 % | DIASTOLIC BLOOD PRESSURE: 71 MMHG | SYSTOLIC BLOOD PRESSURE: 165 MMHG | TEMPERATURE: 96.4 F

## 2022-03-30 DIAGNOSIS — M48.062 LUMBAR STENOSIS WITH NEUROGENIC CLAUDICATION: Primary | ICD-10-CM

## 2022-03-30 DIAGNOSIS — Z01.812 PRE-OPERATIVE LABORATORY EXAMINATION: ICD-10-CM

## 2022-03-30 LAB
CHP ED QC CHECK: NORMAL
GLUCOSE BLD-MCNC: 108 MG/DL
METER GLUCOSE: 108 MG/DL (ref 74–99)
METER GLUCOSE: 146 MG/DL (ref 74–99)
METER GLUCOSE: 154 MG/DL (ref 74–99)
METER GLUCOSE: 180 MG/DL (ref 74–99)

## 2022-03-30 PROCEDURE — 88311 DECALCIFY TISSUE: CPT

## 2022-03-30 PROCEDURE — 2780000010 HC IMPLANT OTHER: Performed by: NEUROLOGICAL SURGERY

## 2022-03-30 PROCEDURE — 6370000000 HC RX 637 (ALT 250 FOR IP): Performed by: NEUROLOGICAL SURGERY

## 2022-03-30 PROCEDURE — C1729 CATH, DRAINAGE: HCPCS | Performed by: NEUROLOGICAL SURGERY

## 2022-03-30 PROCEDURE — 3E0U0GB INTRODUCTION OF RECOMBINANT BONE MORPHOGENETIC PROTEIN INTO JOINTS, OPEN APPROACH: ICD-10-PCS | Performed by: NEUROLOGICAL SURGERY

## 2022-03-30 PROCEDURE — 0SG3071 FUSION OF LUMBOSACRAL JOINT WITH AUTOLOGOUS TISSUE SUBSTITUTE, POSTERIOR APPROACH, POSTERIOR COLUMN, OPEN APPROACH: ICD-10-PCS | Performed by: NEUROLOGICAL SURGERY

## 2022-03-30 PROCEDURE — 2580000003 HC RX 258: Performed by: NURSE ANESTHETIST, CERTIFIED REGISTERED

## 2022-03-30 PROCEDURE — 61783 SCAN PROC SPINAL: CPT | Performed by: PHYSICIAN ASSISTANT

## 2022-03-30 PROCEDURE — 8E0WXBF COMPUTER ASSISTED PROCEDURE OF TRUNK REGION, WITH FLUOROSCOPY: ICD-10-PCS | Performed by: NEUROLOGICAL SURGERY

## 2022-03-30 PROCEDURE — 61783 SCAN PROC SPINAL: CPT | Performed by: NEUROLOGICAL SURGERY

## 2022-03-30 PROCEDURE — 22633 ARTHRD CMBN 1NTRSPC LUMBAR: CPT | Performed by: PHYSICIAN ASSISTANT

## 2022-03-30 PROCEDURE — 2500000003 HC RX 250 WO HCPCS: Performed by: ANESTHESIOLOGY

## 2022-03-30 PROCEDURE — 6360000002 HC RX W HCPCS: Performed by: NEUROLOGICAL SURGERY

## 2022-03-30 PROCEDURE — 88304 TISSUE EXAM BY PATHOLOGIST: CPT

## 2022-03-30 PROCEDURE — 7100000000 HC PACU RECOVERY - FIRST 15 MIN: Performed by: NEUROLOGICAL SURGERY

## 2022-03-30 PROCEDURE — 6360000002 HC RX W HCPCS: Performed by: ANESTHESIOLOGY

## 2022-03-30 PROCEDURE — 22633 ARTHRD CMBN 1NTRSPC LUMBAR: CPT | Performed by: NEUROLOGICAL SURGERY

## 2022-03-30 PROCEDURE — 2709999900 HC NON-CHARGEABLE SUPPLY: Performed by: NEUROLOGICAL SURGERY

## 2022-03-30 PROCEDURE — 95908 NRV CNDJ TST 3-4 STUDIES: CPT | Performed by: AUDIOLOGIST

## 2022-03-30 PROCEDURE — 2500000003 HC RX 250 WO HCPCS: Performed by: NEUROLOGICAL SURGERY

## 2022-03-30 PROCEDURE — 95940 IONM IN OPERATNG ROOM 15 MIN: CPT | Performed by: AUDIOLOGIST

## 2022-03-30 PROCEDURE — 3600000015 HC SURGERY LEVEL 5 ADDTL 15MIN: Performed by: NEUROLOGICAL SURGERY

## 2022-03-30 PROCEDURE — 3209999900 FLUORO FOR SURGICAL PROCEDURES

## 2022-03-30 PROCEDURE — 3600000005 HC SURGERY LEVEL 5 BASE: Performed by: NEUROLOGICAL SURGERY

## 2022-03-30 PROCEDURE — A4217 STERILE WATER/SALINE, 500 ML: HCPCS | Performed by: NEUROLOGICAL SURGERY

## 2022-03-30 PROCEDURE — C1821 INTERSPINOUS IMPLANT: HCPCS | Performed by: NEUROLOGICAL SURGERY

## 2022-03-30 PROCEDURE — 22840 INSERT SPINE FIXATION DEVICE: CPT | Performed by: PHYSICIAN ASSISTANT

## 2022-03-30 PROCEDURE — 63052 LAM FACETC/FRMT ARTHRD LUM 1: CPT | Performed by: NEUROLOGICAL SURGERY

## 2022-03-30 PROCEDURE — 6360000002 HC RX W HCPCS: Performed by: STUDENT IN AN ORGANIZED HEALTH CARE EDUCATION/TRAINING PROGRAM

## 2022-03-30 PROCEDURE — 3700000001 HC ADD 15 MINUTES (ANESTHESIA): Performed by: NEUROLOGICAL SURGERY

## 2022-03-30 PROCEDURE — 00NY0ZZ RELEASE LUMBAR SPINAL CORD, OPEN APPROACH: ICD-10-PCS | Performed by: NEUROLOGICAL SURGERY

## 2022-03-30 PROCEDURE — 63053 LAM FACTC/FRMT ARTHRD LUM EA: CPT | Performed by: PHYSICIAN ASSISTANT

## 2022-03-30 PROCEDURE — 63052 LAM FACETC/FRMT ARTHRD LUM 1: CPT | Performed by: PHYSICIAN ASSISTANT

## 2022-03-30 PROCEDURE — C1713 ANCHOR/SCREW BN/BN,TIS/BN: HCPCS | Performed by: NEUROLOGICAL SURGERY

## 2022-03-30 PROCEDURE — 22853 INSJ BIOMECHANICAL DEVICE: CPT | Performed by: NEUROLOGICAL SURGERY

## 2022-03-30 PROCEDURE — 82962 GLUCOSE BLOOD TEST: CPT

## 2022-03-30 PROCEDURE — 2580000003 HC RX 258: Performed by: STUDENT IN AN ORGANIZED HEALTH CARE EDUCATION/TRAINING PROGRAM

## 2022-03-30 PROCEDURE — 3700000000 HC ANESTHESIA ATTENDED CARE: Performed by: NEUROLOGICAL SURGERY

## 2022-03-30 PROCEDURE — 2580000003 HC RX 258: Performed by: NEUROLOGICAL SURGERY

## 2022-03-30 PROCEDURE — 0SB40ZZ EXCISION OF LUMBOSACRAL DISC, OPEN APPROACH: ICD-10-PCS | Performed by: NEUROLOGICAL SURGERY

## 2022-03-30 PROCEDURE — 22853 INSJ BIOMECHANICAL DEVICE: CPT | Performed by: PHYSICIAN ASSISTANT

## 2022-03-30 PROCEDURE — G0378 HOSPITAL OBSERVATION PER HR: HCPCS

## 2022-03-30 PROCEDURE — 63053 LAM FACTC/FRMT ARTHRD LUM EA: CPT | Performed by: NEUROLOGICAL SURGERY

## 2022-03-30 PROCEDURE — 6360000002 HC RX W HCPCS: Performed by: NURSE ANESTHETIST, CERTIFIED REGISTERED

## 2022-03-30 PROCEDURE — 22840 INSERT SPINE FIXATION DEVICE: CPT | Performed by: NEUROLOGICAL SURGERY

## 2022-03-30 PROCEDURE — 7100000001 HC PACU RECOVERY - ADDTL 15 MIN: Performed by: NEUROLOGICAL SURGERY

## 2022-03-30 PROCEDURE — 0SG30AJ FUSION OF LUMBOSACRAL JOINT WITH INTERBODY FUSION DEVICE, POSTERIOR APPROACH, ANTERIOR COLUMN, OPEN APPROACH: ICD-10-PCS | Performed by: NEUROLOGICAL SURGERY

## 2022-03-30 PROCEDURE — 2720000010 HC SURG SUPPLY STERILE: Performed by: NEUROLOGICAL SURGERY

## 2022-03-30 PROCEDURE — 2500000003 HC RX 250 WO HCPCS: Performed by: NURSE ANESTHETIST, CERTIFIED REGISTERED

## 2022-03-30 DEVICE — VIASORB STRP 20X50X5MM: Type: IMPLANTABLE DEVICE | Site: BACK | Status: FUNCTIONAL

## 2022-03-30 DEVICE — 5.5MM CURVED ROD, TITANIUM ALLOY, 40MM LENGTH
Type: IMPLANTABLE DEVICE | Status: FUNCTIONAL
Brand: CREO

## 2022-03-30 DEVICE — 5.5MM CURVED ROD, TITANIUM ALLOY, 35MM LENGTH
Type: IMPLANTABLE DEVICE | Status: FUNCTIONAL
Brand: CREO

## 2022-03-30 DEVICE — CREO® THREADED 7.5 X 40MM POLYAXIAL SCREW
Type: IMPLANTABLE DEVICE | Site: BACK | Status: FUNCTIONAL
Brand: CREO

## 2022-03-30 DEVICE — CREO® THREADED 7.5 X 45MM POLYAXIAL SCREW
Type: IMPLANTABLE DEVICE | Site: BACK | Status: FUNCTIONAL
Brand: CREO

## 2022-03-30 DEVICE — SUSTAIN RADIOLUCENT SPACER, OBLIQUE, SMALL, 10X22, 8MM
Type: IMPLANTABLE DEVICE | Site: BACK | Status: FUNCTIONAL
Brand: SUSTAIN

## 2022-03-30 DEVICE — BONE GRAFT KIT 7510200 INFUSE SMALL
Type: IMPLANTABLE DEVICE | Site: BACK | Status: FUNCTIONAL
Brand: INFUSE® BONE GRAFT

## 2022-03-30 DEVICE — THREADED LOCKING CAP, CREO
Type: IMPLANTABLE DEVICE | Site: BACK | Status: FUNCTIONAL
Brand: CREO

## 2022-03-30 RX ORDER — CYCLOBENZAPRINE HCL 10 MG
10 TABLET ORAL 3 TIMES DAILY PRN
Status: DISCONTINUED | OUTPATIENT
Start: 2022-03-30 | End: 2022-04-05 | Stop reason: HOSPADM

## 2022-03-30 RX ORDER — ONDANSETRON 2 MG/ML
4 INJECTION INTRAMUSCULAR; INTRAVENOUS EVERY 6 HOURS PRN
Status: DISCONTINUED | OUTPATIENT
Start: 2022-03-30 | End: 2022-04-05 | Stop reason: HOSPADM

## 2022-03-30 RX ORDER — HYDRALAZINE HYDROCHLORIDE 20 MG/ML
10 INJECTION INTRAMUSCULAR; INTRAVENOUS ONCE
Status: COMPLETED | OUTPATIENT
Start: 2022-03-30 | End: 2022-03-30

## 2022-03-30 RX ORDER — LABETALOL HYDROCHLORIDE 5 MG/ML
5 INJECTION, SOLUTION INTRAVENOUS ONCE
Status: COMPLETED | OUTPATIENT
Start: 2022-03-30 | End: 2022-03-30

## 2022-03-30 RX ORDER — BISACODYL 10 MG
10 SUPPOSITORY, RECTAL RECTAL DAILY PRN
Status: DISCONTINUED | OUTPATIENT
Start: 2022-03-30 | End: 2022-04-01

## 2022-03-30 RX ORDER — OXYCODONE HYDROCHLORIDE 5 MG/1
5 TABLET ORAL EVERY 4 HOURS PRN
Status: DISCONTINUED | OUTPATIENT
Start: 2022-03-30 | End: 2022-04-05 | Stop reason: HOSPADM

## 2022-03-30 RX ORDER — SODIUM CHLORIDE 0.9 % (FLUSH) 0.9 %
5-40 SYRINGE (ML) INJECTION EVERY 12 HOURS SCHEDULED
Status: DISCONTINUED | OUTPATIENT
Start: 2022-03-30 | End: 2022-04-05 | Stop reason: HOSPADM

## 2022-03-30 RX ORDER — ROCURONIUM BROMIDE 10 MG/ML
INJECTION, SOLUTION INTRAVENOUS PRN
Status: DISCONTINUED | OUTPATIENT
Start: 2022-03-30 | End: 2022-03-30 | Stop reason: SDUPTHER

## 2022-03-30 RX ORDER — SODIUM CHLORIDE 0.9 % (FLUSH) 0.9 %
5-40 SYRINGE (ML) INJECTION EVERY 12 HOURS SCHEDULED
Status: DISCONTINUED | OUTPATIENT
Start: 2022-03-30 | End: 2022-03-30 | Stop reason: HOSPADM

## 2022-03-30 RX ORDER — SODIUM CHLORIDE 0.9 % (FLUSH) 0.9 %
5-40 SYRINGE (ML) INJECTION PRN
Status: DISCONTINUED | OUTPATIENT
Start: 2022-03-30 | End: 2022-04-05 | Stop reason: HOSPADM

## 2022-03-30 RX ORDER — OXYCODONE HYDROCHLORIDE 10 MG/1
10 TABLET ORAL EVERY 4 HOURS PRN
Status: DISCONTINUED | OUTPATIENT
Start: 2022-03-30 | End: 2022-04-05 | Stop reason: HOSPADM

## 2022-03-30 RX ORDER — MORPHINE SULFATE 2 MG/ML
2 INJECTION, SOLUTION INTRAMUSCULAR; INTRAVENOUS
Status: DISCONTINUED | OUTPATIENT
Start: 2022-03-30 | End: 2022-04-01

## 2022-03-30 RX ORDER — SODIUM CHLORIDE 9 MG/ML
INJECTION, SOLUTION INTRAVENOUS CONTINUOUS
Status: DISCONTINUED | OUTPATIENT
Start: 2022-03-30 | End: 2022-03-30

## 2022-03-30 RX ORDER — GLYCOPYRROLATE 1 MG/5 ML
SYRINGE (ML) INTRAVENOUS PRN
Status: DISCONTINUED | OUTPATIENT
Start: 2022-03-30 | End: 2022-03-30 | Stop reason: SDUPTHER

## 2022-03-30 RX ORDER — PANTOPRAZOLE SODIUM 40 MG/1
40 TABLET, DELAYED RELEASE ORAL
Status: DISCONTINUED | OUTPATIENT
Start: 2022-03-31 | End: 2022-04-05 | Stop reason: HOSPADM

## 2022-03-30 RX ORDER — ONDANSETRON 2 MG/ML
INJECTION INTRAMUSCULAR; INTRAVENOUS PRN
Status: DISCONTINUED | OUTPATIENT
Start: 2022-03-30 | End: 2022-03-30 | Stop reason: SDUPTHER

## 2022-03-30 RX ORDER — DEXAMETHASONE SODIUM PHOSPHATE 10 MG/ML
INJECTION INTRAMUSCULAR; INTRAVENOUS PRN
Status: DISCONTINUED | OUTPATIENT
Start: 2022-03-30 | End: 2022-03-30 | Stop reason: SDUPTHER

## 2022-03-30 RX ORDER — ONDANSETRON 4 MG/1
4 TABLET, ORALLY DISINTEGRATING ORAL EVERY 8 HOURS PRN
Status: DISCONTINUED | OUTPATIENT
Start: 2022-03-30 | End: 2022-04-05 | Stop reason: HOSPADM

## 2022-03-30 RX ORDER — PHENYLEPHRINE HCL IN 0.9% NACL 1 MG/10 ML
SYRINGE (ML) INTRAVENOUS PRN
Status: DISCONTINUED | OUTPATIENT
Start: 2022-03-30 | End: 2022-03-30 | Stop reason: SDUPTHER

## 2022-03-30 RX ORDER — BUPIVACAINE HYDROCHLORIDE 2.5 MG/ML
INJECTION, SOLUTION EPIDURAL; INFILTRATION; INTRACAUDAL PRN
Status: DISCONTINUED | OUTPATIENT
Start: 2022-03-30 | End: 2022-03-30 | Stop reason: HOSPADM

## 2022-03-30 RX ORDER — SODIUM CHLORIDE 9 MG/ML
INJECTION, SOLUTION INTRAVENOUS PRN
Status: DISCONTINUED | OUTPATIENT
Start: 2022-03-30 | End: 2022-03-30 | Stop reason: HOSPADM

## 2022-03-30 RX ORDER — LIDOCAINE HYDROCHLORIDE AND EPINEPHRINE 10; 10 MG/ML; UG/ML
INJECTION, SOLUTION INFILTRATION; PERINEURAL PRN
Status: DISCONTINUED | OUTPATIENT
Start: 2022-03-30 | End: 2022-03-30 | Stop reason: HOSPADM

## 2022-03-30 RX ORDER — FENTANYL CITRATE 50 UG/ML
INJECTION, SOLUTION INTRAMUSCULAR; INTRAVENOUS PRN
Status: DISCONTINUED | OUTPATIENT
Start: 2022-03-30 | End: 2022-03-30 | Stop reason: SDUPTHER

## 2022-03-30 RX ORDER — GABAPENTIN 300 MG/1
300 CAPSULE ORAL 2 TIMES DAILY
Status: DISCONTINUED | OUTPATIENT
Start: 2022-03-30 | End: 2022-04-05 | Stop reason: HOSPADM

## 2022-03-30 RX ORDER — VANCOMYCIN HYDROCHLORIDE 500 MG/10ML
INJECTION, POWDER, LYOPHILIZED, FOR SOLUTION INTRAVENOUS PRN
Status: DISCONTINUED | OUTPATIENT
Start: 2022-03-30 | End: 2022-03-30 | Stop reason: HOSPADM

## 2022-03-30 RX ORDER — ACETAMINOPHEN 325 MG/1
650 TABLET ORAL EVERY 6 HOURS
Status: DISCONTINUED | OUTPATIENT
Start: 2022-03-30 | End: 2022-04-05 | Stop reason: HOSPADM

## 2022-03-30 RX ORDER — NEOSTIGMINE METHYLSULFATE 1 MG/ML
INJECTION, SOLUTION INTRAVENOUS PRN
Status: DISCONTINUED | OUTPATIENT
Start: 2022-03-30 | End: 2022-03-30 | Stop reason: SDUPTHER

## 2022-03-30 RX ORDER — SODIUM CHLORIDE 9 MG/ML
25 INJECTION, SOLUTION INTRAVENOUS PRN
Status: DISCONTINUED | OUTPATIENT
Start: 2022-03-30 | End: 2022-03-30 | Stop reason: HOSPADM

## 2022-03-30 RX ORDER — LOSARTAN POTASSIUM 25 MG/1
25 TABLET ORAL DAILY
Status: DISCONTINUED | OUTPATIENT
Start: 2022-03-30 | End: 2022-04-05 | Stop reason: HOSPADM

## 2022-03-30 RX ORDER — SENNA AND DOCUSATE SODIUM 50; 8.6 MG/1; MG/1
1 TABLET, FILM COATED ORAL 2 TIMES DAILY
Status: DISCONTINUED | OUTPATIENT
Start: 2022-03-30 | End: 2022-04-05 | Stop reason: HOSPADM

## 2022-03-30 RX ORDER — SODIUM CHLORIDE 0.9 % (FLUSH) 0.9 %
5-40 SYRINGE (ML) INJECTION PRN
Status: DISCONTINUED | OUTPATIENT
Start: 2022-03-30 | End: 2022-03-30 | Stop reason: HOSPADM

## 2022-03-30 RX ORDER — PROMETHAZINE HYDROCHLORIDE 25 MG/ML
6.25 INJECTION, SOLUTION INTRAMUSCULAR; INTRAVENOUS ONCE
Status: COMPLETED | OUTPATIENT
Start: 2022-03-30 | End: 2022-03-30

## 2022-03-30 RX ORDER — NICOTINE POLACRILEX 4 MG
15 LOZENGE BUCCAL PRN
Status: DISCONTINUED | OUTPATIENT
Start: 2022-03-30 | End: 2022-04-05 | Stop reason: HOSPADM

## 2022-03-30 RX ORDER — ONDANSETRON 2 MG/ML
4 INJECTION INTRAMUSCULAR; INTRAVENOUS
Status: COMPLETED | OUTPATIENT
Start: 2022-03-30 | End: 2022-03-30

## 2022-03-30 RX ORDER — DIPHENHYDRAMINE HCL 25 MG
25 TABLET ORAL EVERY 6 HOURS PRN
Status: DISCONTINUED | OUTPATIENT
Start: 2022-03-30 | End: 2022-04-05 | Stop reason: HOSPADM

## 2022-03-30 RX ORDER — ZOLPIDEM TARTRATE 5 MG/1
10 TABLET ORAL EVERY EVENING
Status: DISCONTINUED | OUTPATIENT
Start: 2022-03-30 | End: 2022-04-05 | Stop reason: HOSPADM

## 2022-03-30 RX ORDER — SODIUM CHLORIDE 9 MG/ML
INJECTION, SOLUTION INTRAVENOUS CONTINUOUS
Status: DISCONTINUED | OUTPATIENT
Start: 2022-03-30 | End: 2022-04-05 | Stop reason: HOSPADM

## 2022-03-30 RX ORDER — DOCUSATE SODIUM 100 MG/1
200 CAPSULE, LIQUID FILLED ORAL 2 TIMES DAILY
Status: DISCONTINUED | OUTPATIENT
Start: 2022-03-30 | End: 2022-04-01

## 2022-03-30 RX ORDER — MORPHINE SULFATE 4 MG/ML
4 INJECTION, SOLUTION INTRAMUSCULAR; INTRAVENOUS
Status: DISCONTINUED | OUTPATIENT
Start: 2022-03-30 | End: 2022-04-01

## 2022-03-30 RX ORDER — SODIUM PHOSPHATE, DIBASIC AND SODIUM PHOSPHATE, MONOBASIC 7; 19 G/133ML; G/133ML
1 ENEMA RECTAL DAILY PRN
Status: DISCONTINUED | OUTPATIENT
Start: 2022-03-30 | End: 2022-04-01

## 2022-03-30 RX ORDER — POLYETHYLENE GLYCOL 3350 17 G/17G
17 POWDER, FOR SOLUTION ORAL DAILY
Status: DISCONTINUED | OUTPATIENT
Start: 2022-03-30 | End: 2022-04-01

## 2022-03-30 RX ORDER — LIDOCAINE HYDROCHLORIDE 20 MG/ML
INJECTION, SOLUTION INTRAVENOUS PRN
Status: DISCONTINUED | OUTPATIENT
Start: 2022-03-30 | End: 2022-03-30 | Stop reason: SDUPTHER

## 2022-03-30 RX ORDER — DIPHENHYDRAMINE HYDROCHLORIDE 50 MG/ML
25 INJECTION INTRAMUSCULAR; INTRAVENOUS EVERY 6 HOURS PRN
Status: DISCONTINUED | OUTPATIENT
Start: 2022-03-30 | End: 2022-04-05 | Stop reason: HOSPADM

## 2022-03-30 RX ORDER — PROPOFOL 10 MG/ML
INJECTION, EMULSION INTRAVENOUS PRN
Status: DISCONTINUED | OUTPATIENT
Start: 2022-03-30 | End: 2022-03-30 | Stop reason: SDUPTHER

## 2022-03-30 RX ORDER — DEXTROSE MONOHYDRATE 50 MG/ML
100 INJECTION, SOLUTION INTRAVENOUS PRN
Status: DISCONTINUED | OUTPATIENT
Start: 2022-03-30 | End: 2022-04-05 | Stop reason: HOSPADM

## 2022-03-30 RX ORDER — ATORVASTATIN CALCIUM 40 MG/1
40 TABLET, FILM COATED ORAL DAILY
Status: DISCONTINUED | OUTPATIENT
Start: 2022-03-30 | End: 2022-04-05 | Stop reason: HOSPADM

## 2022-03-30 RX ORDER — DEXTROSE MONOHYDRATE 25 G/50ML
12.5 INJECTION, SOLUTION INTRAVENOUS PRN
Status: DISCONTINUED | OUTPATIENT
Start: 2022-03-30 | End: 2022-04-05 | Stop reason: HOSPADM

## 2022-03-30 RX ORDER — SODIUM CHLORIDE 9 MG/ML
INJECTION, SOLUTION INTRAVENOUS CONTINUOUS PRN
Status: DISCONTINUED | OUTPATIENT
Start: 2022-03-30 | End: 2022-03-30 | Stop reason: SDUPTHER

## 2022-03-30 RX ORDER — SODIUM CHLORIDE 9 MG/ML
25 INJECTION, SOLUTION INTRAVENOUS PRN
Status: DISCONTINUED | OUTPATIENT
Start: 2022-03-30 | End: 2022-04-05 | Stop reason: HOSPADM

## 2022-03-30 RX ADMIN — Medication 100 MCG: at 06:56

## 2022-03-30 RX ADMIN — Medication 100 MCG: at 08:46

## 2022-03-30 RX ADMIN — Medication 100 MCG: at 07:46

## 2022-03-30 RX ADMIN — SODIUM CHLORIDE: 9 INJECTION, SOLUTION INTRAVENOUS at 07:35

## 2022-03-30 RX ADMIN — ONDANSETRON 4 MG: 2 INJECTION INTRAMUSCULAR; INTRAVENOUS at 21:43

## 2022-03-30 RX ADMIN — Medication 100 MCG: at 08:34

## 2022-03-30 RX ADMIN — PROMETHAZINE HYDROCHLORIDE 6.25 MG: 25 INJECTION INTRAMUSCULAR; INTRAVENOUS at 10:45

## 2022-03-30 RX ADMIN — Medication 0.2 MG: at 06:33

## 2022-03-30 RX ADMIN — Medication 10 ML: at 21:43

## 2022-03-30 RX ADMIN — Medication 2000 MG: at 14:00

## 2022-03-30 RX ADMIN — SODIUM CHLORIDE: 9 INJECTION, SOLUTION INTRAVENOUS at 12:39

## 2022-03-30 RX ADMIN — Medication 2000 MG: at 06:37

## 2022-03-30 RX ADMIN — HYDROMORPHONE HYDROCHLORIDE 0.5 MG: 1 INJECTION, SOLUTION INTRAMUSCULAR; INTRAVENOUS; SUBCUTANEOUS at 12:03

## 2022-03-30 RX ADMIN — Medication 100 MCG: at 06:34

## 2022-03-30 RX ADMIN — ROCURONIUM BROMIDE 20 MG: 10 SOLUTION INTRAVENOUS at 06:31

## 2022-03-30 RX ADMIN — OXYCODONE HYDROCHLORIDE 10 MG: 10 TABLET ORAL at 17:33

## 2022-03-30 RX ADMIN — FENTANYL CITRATE 50 MCG: 50 INJECTION, SOLUTION INTRAMUSCULAR; INTRAVENOUS at 09:05

## 2022-03-30 RX ADMIN — ONDANSETRON 4 MG: 2 INJECTION INTRAMUSCULAR; INTRAVENOUS at 15:36

## 2022-03-30 RX ADMIN — Medication 2 MG: at 08:45

## 2022-03-30 RX ADMIN — SODIUM CHLORIDE: 9 INJECTION, SOLUTION INTRAVENOUS at 06:24

## 2022-03-30 RX ADMIN — Medication 1 MG: at 08:19

## 2022-03-30 RX ADMIN — HYDROMORPHONE HYDROCHLORIDE 0.5 MG: 1 INJECTION, SOLUTION INTRAMUSCULAR; INTRAVENOUS; SUBCUTANEOUS at 09:33

## 2022-03-30 RX ADMIN — Medication 100 MCG: at 07:11

## 2022-03-30 RX ADMIN — MORPHINE SULFATE 4 MG: 4 INJECTION, SOLUTION INTRAMUSCULAR; INTRAVENOUS at 21:46

## 2022-03-30 RX ADMIN — ROCURONIUM BROMIDE 20 MG: 10 SOLUTION INTRAVENOUS at 06:48

## 2022-03-30 RX ADMIN — HYDROMORPHONE HYDROCHLORIDE 0.5 MG: 1 INJECTION, SOLUTION INTRAMUSCULAR; INTRAVENOUS; SUBCUTANEOUS at 09:52

## 2022-03-30 RX ADMIN — Medication 2000 MG: at 21:45

## 2022-03-30 RX ADMIN — LABETALOL HYDROCHLORIDE 5 MG: 5 INJECTION INTRAVENOUS at 10:41

## 2022-03-30 RX ADMIN — ONDANSETRON 4 MG: 2 INJECTION INTRAMUSCULAR; INTRAVENOUS at 08:45

## 2022-03-30 RX ADMIN — Medication 100 MCG: at 07:19

## 2022-03-30 RX ADMIN — LIDOCAINE HYDROCHLORIDE 40 MG: 20 INJECTION, SOLUTION INTRAVENOUS at 06:31

## 2022-03-30 RX ADMIN — Medication 100 MCG: at 07:26

## 2022-03-30 RX ADMIN — FENTANYL CITRATE 50 MCG: 50 INJECTION, SOLUTION INTRAMUSCULAR; INTRAVENOUS at 08:59

## 2022-03-30 RX ADMIN — Medication 0.2 MG: at 08:19

## 2022-03-30 RX ADMIN — CYCLOBENZAPRINE 10 MG: 10 TABLET, FILM COATED ORAL at 17:33

## 2022-03-30 RX ADMIN — SODIUM CHLORIDE: 9 INJECTION, SOLUTION INTRAVENOUS at 05:54

## 2022-03-30 RX ADMIN — Medication 100 MCG: at 08:25

## 2022-03-30 RX ADMIN — FENTANYL CITRATE 50 MCG: 50 INJECTION, SOLUTION INTRAMUSCULAR; INTRAVENOUS at 09:04

## 2022-03-30 RX ADMIN — HYDROMORPHONE HYDROCHLORIDE 0.5 MG: 1 INJECTION, SOLUTION INTRAMUSCULAR; INTRAVENOUS; SUBCUTANEOUS at 09:41

## 2022-03-30 RX ADMIN — ZOLPIDEM TARTRATE 10 MG: 5 TABLET, FILM COATED ORAL at 22:04

## 2022-03-30 RX ADMIN — DEXAMETHASONE SODIUM PHOSPHATE 10 MG: 10 INJECTION INTRAMUSCULAR; INTRAVENOUS at 06:44

## 2022-03-30 RX ADMIN — Medication 0.4 MG: at 08:45

## 2022-03-30 RX ADMIN — HYDRALAZINE HYDROCHLORIDE 10 MG: 20 INJECTION INTRAMUSCULAR; INTRAVENOUS at 09:30

## 2022-03-30 RX ADMIN — ONDANSETRON 4 MG: 2 INJECTION INTRAMUSCULAR; INTRAVENOUS at 09:36

## 2022-03-30 RX ADMIN — PROPOFOL 200 MG: 10 INJECTION, EMULSION INTRAVENOUS at 06:31

## 2022-03-30 RX ADMIN — FENTANYL CITRATE 100 MCG: 50 INJECTION, SOLUTION INTRAMUSCULAR; INTRAVENOUS at 06:31

## 2022-03-30 RX ADMIN — MORPHINE SULFATE 2 MG: 2 INJECTION, SOLUTION INTRAMUSCULAR; INTRAVENOUS at 14:09

## 2022-03-30 RX ADMIN — Medication 100 MCG: at 07:51

## 2022-03-30 ASSESSMENT — PULMONARY FUNCTION TESTS
PIF_VALUE: 16
PIF_VALUE: 18
PIF_VALUE: 17
PIF_VALUE: 19
PIF_VALUE: 19
PIF_VALUE: 18
PIF_VALUE: 19
PIF_VALUE: 18
PIF_VALUE: 19
PIF_VALUE: 2
PIF_VALUE: 2
PIF_VALUE: 19
PIF_VALUE: 20
PIF_VALUE: 18
PIF_VALUE: 19
PIF_VALUE: 19
PIF_VALUE: 20
PIF_VALUE: 18
PIF_VALUE: 28
PIF_VALUE: 2
PIF_VALUE: 21
PIF_VALUE: 16
PIF_VALUE: 19
PIF_VALUE: 18
PIF_VALUE: 17
PIF_VALUE: 19
PIF_VALUE: 22
PIF_VALUE: 19
PIF_VALUE: 16
PIF_VALUE: 19
PIF_VALUE: 18
PIF_VALUE: 18
PIF_VALUE: 19
PIF_VALUE: 13
PIF_VALUE: 15
PIF_VALUE: 18
PIF_VALUE: 21
PIF_VALUE: 17
PIF_VALUE: 20
PIF_VALUE: 18
PIF_VALUE: 0
PIF_VALUE: 18
PIF_VALUE: 16
PIF_VALUE: 18
PIF_VALUE: 3
PIF_VALUE: 19
PIF_VALUE: 18
PIF_VALUE: 15
PIF_VALUE: 1
PIF_VALUE: 19
PIF_VALUE: 18
PIF_VALUE: 17
PIF_VALUE: 18
PIF_VALUE: 18
PIF_VALUE: 17
PIF_VALUE: 19
PIF_VALUE: 21
PIF_VALUE: 18
PIF_VALUE: 19
PIF_VALUE: 18
PIF_VALUE: 19
PIF_VALUE: 16
PIF_VALUE: 19
PIF_VALUE: 18
PIF_VALUE: 16
PIF_VALUE: 19
PIF_VALUE: 3
PIF_VALUE: 19
PIF_VALUE: 2
PIF_VALUE: 19
PIF_VALUE: 18
PIF_VALUE: 20
PIF_VALUE: 18
PIF_VALUE: 20
PIF_VALUE: 18
PIF_VALUE: 20
PIF_VALUE: 19
PIF_VALUE: 21
PIF_VALUE: 20
PIF_VALUE: 18
PIF_VALUE: 18
PIF_VALUE: 19
PIF_VALUE: 18
PIF_VALUE: 16
PIF_VALUE: 18
PIF_VALUE: 19
PIF_VALUE: 19
PIF_VALUE: 18
PIF_VALUE: 17
PIF_VALUE: 19
PIF_VALUE: 20
PIF_VALUE: 18
PIF_VALUE: 20
PIF_VALUE: 17
PIF_VALUE: 19
PIF_VALUE: 21
PIF_VALUE: 21
PIF_VALUE: 19
PIF_VALUE: 17
PIF_VALUE: 19
PIF_VALUE: 19
PIF_VALUE: 5
PIF_VALUE: 23
PIF_VALUE: 14
PIF_VALUE: 19
PIF_VALUE: 7
PIF_VALUE: 23
PIF_VALUE: 20
PIF_VALUE: 19
PIF_VALUE: 20
PIF_VALUE: 18
PIF_VALUE: 18
PIF_VALUE: 19
PIF_VALUE: 20
PIF_VALUE: 18
PIF_VALUE: 20
PIF_VALUE: 19
PIF_VALUE: 20
PIF_VALUE: 1
PIF_VALUE: 19
PIF_VALUE: 16
PIF_VALUE: 20
PIF_VALUE: 19
PIF_VALUE: 20
PIF_VALUE: 18
PIF_VALUE: 19
PIF_VALUE: 21
PIF_VALUE: 13
PIF_VALUE: 19
PIF_VALUE: 21
PIF_VALUE: 20
PIF_VALUE: 19
PIF_VALUE: 19
PIF_VALUE: 18
PIF_VALUE: 18
PIF_VALUE: 20
PIF_VALUE: 19

## 2022-03-30 ASSESSMENT — PAIN DESCRIPTION - PROGRESSION
CLINICAL_PROGRESSION: NOT CHANGED
CLINICAL_PROGRESSION: NOT CHANGED

## 2022-03-30 ASSESSMENT — PAIN DESCRIPTION - DESCRIPTORS
DESCRIPTORS: ACHING;DISCOMFORT
DESCRIPTORS: ACHING;DISCOMFORT;SORE
DESCRIPTORS: ACHING;CONSTANT;DISCOMFORT
DESCRIPTORS: ACHING;DISCOMFORT
DESCRIPTORS: ACHING;DISCOMFORT;SORE
DESCRIPTORS: ACHING;DISCOMFORT

## 2022-03-30 ASSESSMENT — PAIN DESCRIPTION - LOCATION
LOCATION: BACK
LOCATION: BACK
LOCATION: BACK;LEG
LOCATION: BACK
LOCATION: BACK;LEG
LOCATION: BACK;LEG
LOCATION: BACK
LOCATION: BACK

## 2022-03-30 ASSESSMENT — PAIN SCALES - GENERAL
PAINLEVEL_OUTOF10: 10
PAINLEVEL_OUTOF10: 6
PAINLEVEL_OUTOF10: 5
PAINLEVEL_OUTOF10: 9
PAINLEVEL_OUTOF10: 9
PAINLEVEL_OUTOF10: 8
PAINLEVEL_OUTOF10: 10
PAINLEVEL_OUTOF10: 9

## 2022-03-30 ASSESSMENT — PAIN - FUNCTIONAL ASSESSMENT
PAIN_FUNCTIONAL_ASSESSMENT: 0-10
PAIN_FUNCTIONAL_ASSESSMENT: PREVENTS OR INTERFERES SOME ACTIVE ACTIVITIES AND ADLS
PAIN_FUNCTIONAL_ASSESSMENT: PREVENTS OR INTERFERES SOME ACTIVE ACTIVITIES AND ADLS

## 2022-03-30 ASSESSMENT — PAIN DESCRIPTION - PAIN TYPE
TYPE: SURGICAL PAIN

## 2022-03-30 ASSESSMENT — PAIN DESCRIPTION - FREQUENCY
FREQUENCY: CONTINUOUS

## 2022-03-30 ASSESSMENT — PAIN DESCRIPTION - ONSET
ONSET: ON-GOING

## 2022-03-30 ASSESSMENT — PAIN DESCRIPTION - ORIENTATION
ORIENTATION: MID;LOWER
ORIENTATION: LOWER;MID
ORIENTATION: LOWER;MID
ORIENTATION: LOWER;MID;RIGHT

## 2022-03-30 NOTE — PROGRESS NOTES
Dr. Shital Painter at bedside  Electronically signed by Josephine Cunningham RN on 3/30/2022 at 9:48 AM

## 2022-03-30 NOTE — H&P
I have examined the patient and reviewed the H and P and no changes are noted.   Left leg is the worse    Tata Shell MD

## 2022-03-30 NOTE — PROGRESS NOTES
Call placed to Dr. Josy Ramesh and notified of current blood pressure of 190/75 and heart rate of 80. Order obtained for hydralazine 10mg IV once.   Electronically signed by Jacob Lucero RN on 3/30/2022 at 9:23 AM

## 2022-03-30 NOTE — PROGRESS NOTES
Consult received. Patient remains in the OR at this time. Dr. Fidelina Odell to evaluate patient's appropriateness for ARU once therapy evaluations are completed.     Linda Fang RN, Pre-screener for Acute Rehab  P: [phone]

## 2022-03-30 NOTE — PROGRESS NOTES
INTRAOPERATIVE MONITORING EMG REPORT    Diagnosis: stenosis  Procedure: PLIF L5/S1   Anesthesia: isoflurane  Surgeon: Vinh Kang M.D. Intra-op Monitorin.75  hours    Procedure:     EMG recording electrodes were placed over the vastus medialis, vastus lateralis, anterior tibialis, and medial gastrocnemius   musles for recording spontaneous EMG activity. A silent control was performed to test the integrity of the system prior to the procedure. Results:  Spontaneous EMG was quiet throughout most of the decompression. EMG was noted from the left and right AT during L5/S1 cage trial from the left side. Activity quieted. EMG from the left and right AT was also noted during cage placement at the right L5/S1. EMG continued during left L5/S1 cage placement. EMG then quieted prior to screw testing. EMG was quiet during cage placement and at closing.       Evoked EMG:   LEFT    Direct Nerve (mA)            Screw (mA)   L5                                                      24  S1                                                      >30        RIGHT     L5                                                      >30  S1                                                      >30

## 2022-03-30 NOTE — PROGRESS NOTES
Call placed to Dr. Tamara Paez and notified that patient had received zofran earlier at 2921. Patient is currently dry heaving. Patient's blood pressure is also 194/92 and heart rate is 118. Order obtained for labetalol 5mg IV and phenergan 6.25mg IM.   Electronically signed by Carrington Mas RN on 3/30/2022 at 10:36 AM

## 2022-03-30 NOTE — PROGRESS NOTES
Message sent to Dr. Henri Leung and notified of [facility] medical management consult from Dr. Yu Donahue. Pateint had L5-S1 PLIF. PCP is Dr. Sharon Díaz"  Electronically signed by Jim Prasad RN on 3/30/2022 at 12:32 PM     Per Dr. Pollo Ballard, medical management consult is to go to Dr. Criss Thompson. Call placed to Dr. Betty Govea answering service and notified of new medical management consult.   Electronically signed by Jim Prasad RN on 3/30/2022 at 1:18 PM

## 2022-03-30 NOTE — ANESTHESIA PRE PROCEDURE
Department of Anesthesiology  Preprocedure Note       Name:  Dayron Cardenas   Age:  64 y.o.  :  1945                                          MRN:  [id number]         Date:  3/30/2022      Surgeon: Jian Eisenberg):  Radha Marie MD    Procedure: Procedure(s):  L5-S1 POSTERIOR LUMBAR INTERBODY FUSION - O ARM, C ARM, JAYDEN TABLE, CELL SAVER, PLATELET GELL, AUDIOLOGY, CAGES, PLATES, GLOBUS    Medications prior to admission:   Prior to Admission medications    Medication Sig Start Date End Date Taking? Authorizing Provider   traMADol (ULTRAM) 50 MG tablet Take 1 tablet by mouth every 8 hours as needed for Pain for up to 30 days. 3/7/22 4/6/22  Joan Buclissett, DO   zolpidem (AMBIEN) 10 MG tablet Take 1 tablet by mouth every evening for 30 days. 12/29/21 3/14/22  Joan Cade, DO   losartan (COZAAR) 25 MG tablet Take 1 tablet by mouth daily 21   Cal Will, DO   metoprolol tartrate (LOPRESSOR) 25 MG tablet Take 0.5 tablets by mouth 2 times daily 21   Cal Will, DO   clopidogrel (PLAVIX) 75 MG tablet Take 1 tablet by mouth daily 21   Cal Will, DO   gabapentin (NEURONTIN) 300 MG capsule Take 300 mg by mouth 2 times daily.      Historical Provider, MD   atorvastatin (LIPITOR) 40 MG tablet Take 1 tablet by mouth daily 20   Joan Cade, DO   empagliflozin (JARDIANCE) 25 MG tablet Take 25 mg by mouth daily 20   Joan Cade, DO   pantoprazole (PROTONIX) 40 MG tablet TK 1 T PO QD 16   Historical Provider, MD       Current medications:    Current Facility-Administered Medications   Medication Dose Route Frequency Provider Last Rate Last Admin    0.9 % sodium chloride infusion  25 mL IntraVENous PRN DANIELA Pedro        0.9 % sodium chloride infusion   IntraVENous Continuous DANIELA Pedro 125 mL/hr at 22 0554 New Bag at 22 0554    ceFAZolin (ANCEF) 2000 mg in sterile water 20 mL IV syringe  2,000 mg IntraVENous See Admin Instructions Zarina Syed DANIELA Chan        sodium chloride flush 0.9 % injection 5-40 mL  5-40 mL IntraVENous 2 times per day DANIELA Lubin        sodium chloride flush 0.9 % injection 5-40 mL  5-40 mL IntraVENous PRN DANIELA Lubin           Allergies:  No Known Allergies    Problem List:    Patient Active Problem List   Diagnosis Code    Coronary artery disease involving native coronary artery of native heart without angina pectoris I25.10    Carotid artery stenosis, asymptomatic, bilateral I65.23    Hx of CABG Z95.1    Essential hypertension I10    Mixed hyperlipidemia E78.2    PFO (patent foramen ovale) Q21.1    Status post placement of implantable loop recorder Z95.818    Lumbar disc disease M51.9    Benign prostatic hyperplasia without lower urinary tract symptoms N40.0    Dupuytren's disease of palm of left hand M72.0    Primary insomnia F51.01    Carotid atherosclerosis, bilateral I65.23    Type 2 diabetes mellitus without complication, without long-term current use of insulin (HCC) E11.9    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)        Past Surgical History:        Procedure Laterality Date    ANKLE FRACTURE SURGERY Left 2020    BACK SURGERY      CARDIAC CATHETERIZATION  4/26/2016    Dr Jillian Joaquin GRAFT  4/27/16    x3    DIAGNOSTIC CARDIAC CATH LAB PROCEDURE  04/26/2016    INSERTABLE CARDIAC MONITOR Left 10/11/2016    LINQ loop recorder, [employer], Dr. Maylin Romero  03/19/2021    removal       Social History: Normal right atrium size. Mitral Valve   Structurally normal mitral valve. Trace mitral regurgitation. Tricuspid Valve   Moderate tricuspid regurgitation. RVSP is 28 mmHg. Aortic Valve   Focal calcification of the aortic valve . No hemodynamically significant aortic stenosis is present. No evidence of aortic valve regurgitation. Pulmonic Valve   The pulmonic valve was not well visualized. Pericardial Effusion   No evidence of pericardial effusion. Aorta   Aortic root dimension within normal limits. Miscellaneous   The inferior vena cava diameter is normal with normal respiratory   variation. Conclusions      Summary   Normal left ventricle size and systolic function. The left atrium is mildly dilated. Trace mitral regurgitation. Moderate tricuspid regurgitation. RVSP is 28 mmHg.       Signature      ----------------------------------------------------------------   Electronically signed by Michael VásquezInterpreting   physician) on 01/19/2018 05:04 PM   ----------------------------------------------------------------      Anesthesia Evaluation  Patient summary reviewed and Nursing notes reviewed no history of anesthetic complications:   Airway: Mallampati: II  TM distance: >3 FB   Neck ROM: full  Mouth opening: > = 3 FB Dental:    (+) edentulous      Pulmonary:normal exam  breath sounds clear to auscultation  (+) COPD:                            ROS comment: Former smoker   Cardiovascular:    (+) hypertension:, valvular problems/murmurs (PFO):, past MI (NSTEMI):, CAD:, CABG/stent (CABG x32):, hyperlipidemia    (-) no pulmonary hypertension    ECG reviewed  Rhythm: regular  Rate: normal  Echocardiogram reviewed  Stress test reviewed  Cleared by cardiology     Beta Blocker:  Dose within 24 Hrs         Neuro/Psych:   (+) CVA (cryptogenic CVA):, neuromuscular disease:, depression/anxiety              ROS comment: Lumbar disc disease    For L5-S1 PLIF today 3/30/22 GI/Hepatic/Renal: Neg GI/Hepatic/Renal ROS            Endo/Other:    (+) Diabetes (BG-108)Type II DM, , blood dyscrasia: anticoagulation therapy:., .          Pt had no PAT visit       Abdominal:             Vascular:   + PVD, aortic or cerebral, . ROS comment: Bilateral carotid stenosis  . Other Findings:           Anesthesia Plan      general     ASA 4       Induction: intravenous. arterial line and BIS  MIPS: Postoperative opioids intended, Prophylactic antiemetics administered and Postoperative trial extubation. Anesthetic plan and risks discussed with patient. Use of blood products discussed with patient whom consented to blood products. Plan discussed with attending.                 Rivera Jain, DINAH - CRNA   3/30/2022

## 2022-03-30 NOTE — BRIEF OP NOTE
Brief Postoperative Note      Patient: Dennise Shaver  YOB: 1945  MRN: [id number]    Date of Procedure: 3/30/2022    Pre-Op Diagnosis: LUMBAR STENOSIS    Post-Op Diagnosis: Same       Procedure(s):  L5-S1 POSTERIOR LUMBAR INTERBODY FUSION - O ARM, C ARM, JAYDEN TABLE, CELL SAVER, PLATELET GELL, AUDIOLOGY, CAGES, PLATES, GLOBUS    Surgeon(s):  Johanna Craig MD    Assistant:  Physician Assistant: DANIELA Zhong    Anesthesia: General    Estimated Blood Loss (mL): less than 501     Complications: None    Specimens:   ID Type Source Tests Collected by Time Destination   A : LUMBAR DISC  Tissue Tissue SURGICAL PATHOLOGY Johanna Craig MD 3/30/2022 0825        Implants:  Implant Name Type Inv. Item Serial No.  Lot No. LRB No. Used Action   VIASORB STRP [id number] - [id number]  VIASORB STRP [id number] Oilex  N/A 1 Implanted   SCREW SPNL L40MM DIA7. 5MM THORLUM THRD PREASSEMBLED FOR - [id number]  SCREW SPNL L40MM DIA7. 5MM THORLUM THRD PREASSEMBLED FOR  Oilex  N/A 2 Implanted   SCREW SPNL L45MM DIA7. [address] NONCANNULATED KEYSHAWN - [id number]  SCREW SPNL L45MM DIA7. 5MM THORLUM PEDCL NONCANNULATED KEYSHAWN  Oilex  N/A 2 Implanted   CAP SPNL THORLUM KEYSHAWN THRD CREO - [id number]  CAP SPNL THORLUM KEYSHAWN THRD CREO  [employer]  N/A 4 Implanted   KIT GRAFT BONE SM RHBMP-2 4.2MG INJ 5ML CONTAIN NDL 20GA - [id number]  KIT GRAFT BONE SM RHBMP-2 4.2MG INJ 5ML CONTAIN NDL 20GA  olook-WD [id number] N/A 1 Implanted   SPACER SPNL SM [id number] THORLUM PEEK OBLQ RADLUC CNVX - [id number]  SPACER SPNL SM [id number] THORLUM PEEK OBLQ RADLUC CNVX  Oilex  N/A 2 Implanted         Drains:   Closed/Suction Drain Midline Back Accordion 10 Welsh (Active)       Urethral Catheter Non-latex 16 fr (Active)       Findings: see dictated op note    Electronically signed by Milad Alcantar MD on 3/30/2022 at 9:02 AM

## 2022-03-30 NOTE — PROGRESS NOTES
Pia Escobedo was ordered [facility] which is a nonformulary medication. This medication will need to be supplied by the patient as the pharmacy does not carry this non-formulary medication. If the medication has not been administered by 1400 on the following day from the time the order was placed, a pharmacist will follow-up with the nurse of the patient to assess the capability of the patient to bring in the medication. If it is determined that the patient cannot supply the medication and it is not available to be dispensed from the pharmacy, the provider will be notified. Jodie Dean, CamposD, [facility], BCPS 3/30/2022 5:34 PM

## 2022-03-31 LAB
ANION GAP SERPL CALCULATED.3IONS-SCNC: 9 MMOL/L (ref 7–16)
BUN BLDV-MCNC: 18 MG/DL (ref 6–23)
CALCIUM SERPL-MCNC: 8.7 MG/DL (ref 8.6–10.2)
CHLORIDE BLD-SCNC: 104 MMOL/L (ref 98–107)
CO2: 23 MMOL/L (ref 22–29)
CREAT SERPL-MCNC: 1 MG/DL (ref 0.7–1.2)
GFR AFRICAN AMERICAN: >60
GFR NON-AFRICAN AMERICAN: >60 ML/MIN/1.73
GLUCOSE BLD-MCNC: 135 MG/DL (ref 74–99)
HCT VFR BLD CALC: 40.1 % (ref 37–54)
HEMOGLOBIN: 13.1 G/DL (ref 12.5–16.5)
MCH RBC QN AUTO: 31.1 PG (ref 26–35)
MCHC RBC AUTO-ENTMCNC: 32.7 % (ref 32–34.5)
MCV RBC AUTO: 95.2 FL (ref 80–99.9)
METER GLUCOSE: 111 MG/DL (ref 74–99)
METER GLUCOSE: 118 MG/DL (ref 74–99)
METER GLUCOSE: 119 MG/DL (ref 74–99)
METER GLUCOSE: 131 MG/DL (ref 74–99)
PDW BLD-RTO: 14.8 FL (ref 11.5–15)
PLATELET # BLD: 199 E9/L (ref 130–450)
PMV BLD AUTO: 12.1 FL (ref 7–12)
POTASSIUM SERPL-SCNC: 4.6 MMOL/L (ref 3.5–5)
RBC # BLD: 4.21 E12/L (ref 3.8–5.8)
SODIUM BLD-SCNC: 136 MMOL/L (ref 132–146)
WBC # BLD: 17.9 E9/L (ref 4.5–11.5)

## 2022-03-31 PROCEDURE — 85027 COMPLETE CBC AUTOMATED: CPT

## 2022-03-31 PROCEDURE — 97535 SELF CARE MNGMENT TRAINING: CPT

## 2022-03-31 PROCEDURE — 1200000000 HC SEMI PRIVATE

## 2022-03-31 PROCEDURE — 2700000000 HC OXYGEN THERAPY PER DAY

## 2022-03-31 PROCEDURE — 2500000003 HC RX 250 WO HCPCS: Performed by: NEUROLOGICAL SURGERY

## 2022-03-31 PROCEDURE — 96376 TX/PRO/DX INJ SAME DRUG ADON: CPT

## 2022-03-31 PROCEDURE — 82962 GLUCOSE BLOOD TEST: CPT

## 2022-03-31 PROCEDURE — 97161 PT EVAL LOW COMPLEX 20 MIN: CPT

## 2022-03-31 PROCEDURE — 6370000000 HC RX 637 (ALT 250 FOR IP): Performed by: NEUROLOGICAL SURGERY

## 2022-03-31 PROCEDURE — 80048 BASIC METABOLIC PNL TOTAL CA: CPT

## 2022-03-31 PROCEDURE — 96374 THER/PROPH/DIAG INJ IV PUSH: CPT

## 2022-03-31 PROCEDURE — 6360000002 HC RX W HCPCS: Performed by: NEUROLOGICAL SURGERY

## 2022-03-31 PROCEDURE — 2580000003 HC RX 258: Performed by: NEUROLOGICAL SURGERY

## 2022-03-31 PROCEDURE — 97530 THERAPEUTIC ACTIVITIES: CPT

## 2022-03-31 PROCEDURE — 97165 OT EVAL LOW COMPLEX 30 MIN: CPT

## 2022-03-31 PROCEDURE — 36415 COLL VENOUS BLD VENIPUNCTURE: CPT

## 2022-03-31 PROCEDURE — 96375 TX/PRO/DX INJ NEW DRUG ADDON: CPT

## 2022-03-31 RX ADMIN — ZOLPIDEM TARTRATE 10 MG: 5 TABLET, FILM COATED ORAL at 20:07

## 2022-03-31 RX ADMIN — PANTOPRAZOLE SODIUM 40 MG: 40 TABLET, DELAYED RELEASE ORAL at 06:20

## 2022-03-31 RX ADMIN — ONDANSETRON 4 MG: 2 INJECTION INTRAMUSCULAR; INTRAVENOUS at 20:38

## 2022-03-31 RX ADMIN — Medication 2000 MG: at 16:02

## 2022-03-31 RX ADMIN — Medication 10 ML: at 08:44

## 2022-03-31 RX ADMIN — GABAPENTIN 300 MG: 300 CAPSULE ORAL at 08:38

## 2022-03-31 RX ADMIN — CYCLOBENZAPRINE 10 MG: 10 TABLET, FILM COATED ORAL at 13:08

## 2022-03-31 RX ADMIN — MORPHINE SULFATE 4 MG: 4 INJECTION, SOLUTION INTRAMUSCULAR; INTRAVENOUS at 06:21

## 2022-03-31 RX ADMIN — ONDANSETRON 4 MG: 2 INJECTION INTRAMUSCULAR; INTRAVENOUS at 08:38

## 2022-03-31 RX ADMIN — ACETAMINOPHEN 650 MG: 325 TABLET ORAL at 17:19

## 2022-03-31 RX ADMIN — METOPROLOL TARTRATE 12.5 MG: 25 TABLET, FILM COATED ORAL at 20:07

## 2022-03-31 RX ADMIN — OXYCODONE HYDROCHLORIDE 10 MG: 10 TABLET ORAL at 23:35

## 2022-03-31 RX ADMIN — ACETAMINOPHEN 650 MG: 325 TABLET ORAL at 13:08

## 2022-03-31 RX ADMIN — CYCLOBENZAPRINE 10 MG: 10 TABLET, FILM COATED ORAL at 03:06

## 2022-03-31 RX ADMIN — LOSARTAN POTASSIUM 25 MG: 25 TABLET, FILM COATED ORAL at 08:38

## 2022-03-31 RX ADMIN — OXYCODONE HYDROCHLORIDE 10 MG: 10 TABLET ORAL at 08:38

## 2022-03-31 RX ADMIN — DOCUSATE SODIUM 200 MG: 100 CAPSULE, LIQUID FILLED ORAL at 20:07

## 2022-03-31 RX ADMIN — METOPROLOL TARTRATE 12.5 MG: 25 TABLET, FILM COATED ORAL at 08:38

## 2022-03-31 RX ADMIN — BISACODYL 5 MG: 5 TABLET, COATED ORAL at 08:38

## 2022-03-31 RX ADMIN — Medication 2000 MG: at 23:35

## 2022-03-31 RX ADMIN — POLYETHYLENE GLYCOL 3350 17 G: 17 POWDER, FOR SOLUTION ORAL at 08:37

## 2022-03-31 RX ADMIN — SENNOSIDES AND DOCUSATE SODIUM 1 TABLET: 8.6; 5 TABLET ORAL at 20:07

## 2022-03-31 RX ADMIN — MORPHINE SULFATE 4 MG: 4 INJECTION, SOLUTION INTRAMUSCULAR; INTRAVENOUS at 00:41

## 2022-03-31 RX ADMIN — GABAPENTIN 300 MG: 300 CAPSULE ORAL at 20:07

## 2022-03-31 RX ADMIN — ATORVASTATIN CALCIUM 40 MG: 40 TABLET, FILM COATED ORAL at 08:38

## 2022-03-31 RX ADMIN — CYCLOBENZAPRINE 10 MG: 10 TABLET, FILM COATED ORAL at 23:35

## 2022-03-31 RX ADMIN — OXYCODONE HYDROCHLORIDE 10 MG: 10 TABLET ORAL at 13:08

## 2022-03-31 RX ADMIN — SENNOSIDES AND DOCUSATE SODIUM 1 TABLET: 8.6; 5 TABLET ORAL at 08:44

## 2022-03-31 RX ADMIN — DOCUSATE SODIUM 200 MG: 100 CAPSULE, LIQUID FILLED ORAL at 08:38

## 2022-03-31 RX ADMIN — ACETAMINOPHEN 650 MG: 325 TABLET ORAL at 06:20

## 2022-03-31 RX ADMIN — Medication 2000 MG: at 06:20

## 2022-03-31 RX ADMIN — ACETAMINOPHEN 650 MG: 325 TABLET ORAL at 23:36

## 2022-03-31 RX ADMIN — Medication 10 ML: at 20:07

## 2022-03-31 RX ADMIN — OXYCODONE HYDROCHLORIDE 10 MG: 10 TABLET ORAL at 03:06

## 2022-03-31 ASSESSMENT — PAIN SCALES - GENERAL
PAINLEVEL_OUTOF10: 7
PAINLEVEL_OUTOF10: 10
PAINLEVEL_OUTOF10: 8
PAINLEVEL_OUTOF10: 9
PAINLEVEL_OUTOF10: 4
PAINLEVEL_OUTOF10: 7
PAINLEVEL_OUTOF10: 6
PAINLEVEL_OUTOF10: 3
PAINLEVEL_OUTOF10: 4

## 2022-03-31 ASSESSMENT — PAIN DESCRIPTION - DESCRIPTORS
DESCRIPTORS: ACHING;DISCOMFORT

## 2022-03-31 ASSESSMENT — PAIN DESCRIPTION - PAIN TYPE
TYPE: SURGICAL PAIN

## 2022-03-31 ASSESSMENT — PAIN DESCRIPTION - ORIENTATION: ORIENTATION: LEFT

## 2022-03-31 ASSESSMENT — PAIN DESCRIPTION - LOCATION
LOCATION: BACK

## 2022-03-31 NOTE — PROGRESS NOTES
Met with patient in his room to discuss ARU and discharge planning. Patient lives with his wife. Explained ARU program and that length of stay is dependent on patient's progress, therapy/physician recommendations, and how long insurance will approve for. Patient verbalized his understanding. Patient is agreeable to [facility] ARU if approved by PMR physician and insurance. All questions answered at this time. Will review with Dr. Jemma Lam today for final determination.     Dileep Mendoza RN, Pre-screener for Acute Rehab  P: [phone]

## 2022-03-31 NOTE — CARE COORDINATION
Care Coordination  Met with patient and family at bedside to discuss transition care planning. Patient lives in a 1 story home with his wife and has 3 steps to get into the home. Dme is a wheeled walker and a cane. He has no history of skilled care or home care. [facility] and [facility]. The patient is post op day 1 L5-S1 posterior lumbar interbody fusion. Physicial Therapy ampa is 17/24 and he walked 35 feet with a wheeled walker, occupation therapy [facility]  16/24. Patient has a back brace on. Acute rehab ordered await their eval.  If plan is home he has a wheeled walker and I went over home care list and he chose [facility] home care referral was made to [address] [place] [address]. The Plan for Transition of Care is related to the following treatment goals: home care    The Patient and/or patient representative patient was provided with a choice of provider and agrees   with the discharge plan. [x] Yes [] No    Freedom of choice list was provided with basic dialogue that supports the patient's individualized plan of care/goals, treatment preferences and shares the quality data associated with the providers.  [x] Yes [] No

## 2022-03-31 NOTE — OP NOTE
[address]                  [address] [facility],  [facility]                                OPERATIVE REPORT    PATIENT NAME: Donovan Alberto                    :        1945  MED REC NO:   [id number]                            ROOM:       5208  ACCOUNT NO:   [de-identified]                           ADMIT DATE: 2022  PROVIDER:     Arnoldo Danielson MD    DATE OF PROCEDURE:  2022    PREOPERATIVE DIAGNOSIS:  L5-S1 severe foraminal stenosis. POSTOPERATIVE DIAGNOSIS:  Severe L5-S1 foraminal stenosis. OPERATIONS PERFORMED:  1.  Bilateral segmental arthrodesis and fusion at L5-S1 with use of  bilateral L5 and S1 pedicle screws with use of allograft in the form of  BioZorb strips, locally harvested autograft and recombinant BMP-2  (Infuse) for posterolateral fusion at L5-S1.  2.  360-degree fusion with posterior lumbar interbody fusion at L5-S1  with Globus Sustain PEEK interbody spacers packed with recombinant BMP-2  (Infuse). 3.  Bilateral L5 and S1 laminectomy with bilateral L5-S1 medial  facetectomy with bilateral L5 and S1 foraminotomy and bilateral L5-S1  diskectomy. 4.  Use of O-arm assisted navigation with placement of pedicle screws. 5.  Use of free-running EMG to test pedicle screw heads. 6.  Use of intraoperative fluoroscopy interpreted by myself, the  surgeon. 7.  AS modifier for Sylvester Vincent PA-C, who assisted with primary  exposure and primary closure. ANESTHESIA:  Generalized endotracheal anesthesia. SURGEON:  Arnoldo Danielson MD    ASSISTANT:  Sylvester Vincent PA-C    COMPLICATIONS:  None. ESTIMATED BLOOD LOSS:  100 mL. SPECIMEN:  Disk. OPERATIVE INDICATIONS:  The patient is a 68-year-old gentleman who  presented to the office complaining of back pain that radiated into his  legs. He had an MRI that showed that he had severe foraminal stenosis  at L5-S1.   He has failed conservative therapy and after risks, benefits  and alternatives were discussed with the patient, it was determined that  he would undergo the above-listed procedure. Of note, Genny Lau PA-C's services were required as he was the only qualified assistant to  assist with primary exposure and primary closure. OPERATIVE PROCEDURE:  The patient was brought into the operating room. A timeout was performed where he was identified by his name, medical  record number and the operative procedure which he was about to undergo. Next, induction of generalized endotracheal anesthesia was then  commenced. Upon completion of induction of generalized endotracheal  anesthesia, he received preoperative antibiotics. Strong catheter was  placed. Monitoring electrodes were placed into the muscle groups in his  lower extremities for free-running EMG testing. He was then flipped  into prone position on a John table. All pressure points were  padded. His lumbosacral region was prepped and draped in the usual  sterile fashion. After this was done, a #10 blade was used to make a  skin incision. Monopolar cautery was used to dissect through the  subcutaneous tissue. I placed a self-retaining Weitlaner retractor into  the wound. Next, I opened up the lumbodorsal fascia sharply with  monopolar cautery and exposed the spinous processes at L5 and S1. Once I had exposed the spinous processes at L5 and S1, I then proceeded  to perform a subperiosteal dissection to expose the bilateral lamina at  L5 and the bilateral transverse processes at L5. I also exposed the  bilateral lamina at S1 with the sacral ala. Next, I attached the O-arm  reference frame to the S1 spinous process. Using O-arm assisted  navigation, I placed bilateral L5 and S1 pedicle screws. I performed  another O-arm spin that showed that the screws were within the pedicles. I used a handheld stimulator to test the pedicle screw heads. There was  no evidence of pedicle breach.   I then removed the O-arm from the field. I placed self-retaining angled cerebellar retractors into the wound. I  used a Leksell rongeur to bite up the spinous process at L5 and S1. I  used a high-speed bebe to thin out the lamina bilaterally at L5 and S1  and I then used #4 Kerrison punch to start my central decompression. I  performed bilateral L5 and S1 laminectomy. I then focused my attention to the lateral recesses. I used #3 Kerrison  punch to perform bilateral L5-S1 medial facetectomy and bilateral L5 and  S1 foraminotomy. After this was done, I identified the L5-S1 disk space  on the left, retracted the thecal sac medially, performed an annulotomy  with a #11 blade. I removed disk material with pituitary rongeurs and  curettes. I inserted a #8 shaver that was rotated 360 degrees and after  this was done, I then placed an 8 x 10 x 22 Globus trial followed by an  8 x 10 x 22 Globus PEEK interbody spacer packed with recombinant BMP-2  (Infuse). I then went over the patient's right side, identified the L5-S1 disk  space, performed an annulotomy with a #11 blade. I removed disk  material with pituitary rongeurs and curettes. I prepared both the  superior and inferior endplates. I placed #8 shaver that was rotated  360 degrees and after this was done, I placed an 8 x 10 x 22 Globus  trial followed by an 8 x 10 x 22 Globus PEEK interbody spacer packed  with recombinant BMP-2 (Infuse). After this was done, I then placed  rods into the screw heads. I locked the rods now using locking caps in  a torque/countertorque mechanism. I used intraoperative fluoroscopy to  inspect the construct, it appeared sound. I then irrigated the wound copiously with antibiotic-impregnated saline. I used a high-speed bebe to decorticate the transverse processes  bilaterally at L5 and the sacral ala.   I then laid out my bone grafting  material in the lateral gutters which consisted of locally harvested  autograft plus allograft in the form of BioZorb strips and recombinant  BMP-2 (Infuse). I placed a Hemovac drain in the wound. I then  proceeded to close the wound in layers using 0 Vicryl for the fascia,  2-0 Vicryl for the subcutaneous layer and staples for the skin. A dry  sterile dressing was placed over this. The patient was then  subsequently flipped into supine position on his hospital bed, was  extubated and transported to the postanesthesia care unit in stable  condition. There were no complications. Counts were correct. I was  present for the entire case.         Koki Dennis MD    D: 03/30/2022 19:16:09       T: 03/30/2022 19:18:49     [email or web address]  Job#: [id number]     Doc#: [id number]    CC:

## 2022-03-31 NOTE — PROGRESS NOTES
Physical Therapy  Physical Therapy Initial Assessment     Name: Kimberly Cheung  : 1945  MRN: [id number]      Date of Service: 3/31/2022    Evaluating PT:  Gurwinder Atkins PT, DPT [id number]     Room #:  9828/6505-O  Diagnosis:  Lumbar stenosis with neurogenic claudication [M48.062]  Reason for admission: back pain  Precautions:  Falls, spinal neutral, LSO, hemovac   Procedure/Surgery:  3/30 L5-S1 PLIF  Equipment Recommendations:  FWW     SUBJECTIVE:  Pt lives with his wife in a 1 story home with 4 DESTINEY either front or back with 0 or 1 rail respectively. Pt ambulated with no AD PTA. OBJECTIVE:   Initial Evaluation  Date: 3/31 Treatment   Short Term/ Long Term   Goals   AM-PAC 6 Clicks      Was pt agreeable to Eval/treatment? Yes      Does pt have pain?  5/10 abdomen      Bed Mobility  Rolling: Graham  Supine to sit: Graham  Sit to supine: NT  Scooting: Graham  Independent    Transfers Sit to stand: Graham  Stand to sit: Graham  Stand pivot: NT  Independent    Ambulation    35 feet with [employer] Graham    >200 feet with AAD Mod I vs independent   Stair negotiation: ascended and descended  NT  4 steps with 1 rail Mod I      LE ROM: WFL    LE Strength:   knee ext: 5/5  ankle DF: 5/5    Balance:   Sitting static: Independent  Sitting dynamic: Supervision  Standing static: SBA  Standing dynamic: Graham [employer]    -Pt is A & O x 3  -Sensation:  Pt denies numbness and tingling to extremities  -Edema:  unremarkable     Therapeutic Exercises:  Functional activity     Patient education  Pt educated on safety, sequencing of transfers, and role of PT    Patient response to education:   Pt verbalized understanding Pt demonstrated skill Pt requires further education in this area   Yes  Yes  Yes      ASSESSMENT:  Conditions Requiring Skilled Therapeutic Intervention:  []Decreased strength     []Decreased ROM  [x]Decreased functional mobility  [x]Decreased balance   [x]Decreased endurance   []Decreased posture  []Decreased sensation  []Decreased coordination   []Decreased vision  [x]Decreased safety awareness   [x]Increased pain       Comments:  Pt received supine in bed and agreeable to PT session   Educated on spinal precautions and use of brace with good understanding. He requires light assist and guidance for all mobility. he complains of pain with all transfers which settles slightly with time to acclimate. His gait is slow and hesitant but overall stable. He requires persistent cueing for ww approximation. Sitting in chair to finish   Pt with all needs met and call light in reach. Pt would benefit from continued PT POC to address functional deficits described above. Treatment:  Patient practiced and was instructed in the following treatment:     Therapeutic activity  o Patient education provided continuously throughout session for sequencing, safety maintenance, and improving any deficits found during the evaluation. o Bed mobility training - pt given verbal and tactile cues to facilitate proper sequencing and safety during rolling and supine>sit as well as provided with physical assistance to complete task    o STS and pivot transfer training - pt educated on proper hand and foot placement, safety and sequencing, and use of proper technique to safely complete sit<>stand and pivot transfers with hands on assistance to complete task safely   o Gait training- pt was given verbal and tactile cues to facilitate improved speed and balance during ambulation as well as provided with physical assistance. Pt's/ family goals   1. Return home     Patient and or family understand(s) diagnosis, prognosis, and plan of care. Yes     Prognosis is good for reaching above PT goals.     PHYSICAL THERAPY PLAN OF CARE:    PT POC is established based on physician order and patient diagnosis     Referring provider/PT Order: 03/30/22 1615   PT eval and treat Start: 03/30/22 1615, End: 03/30/22 1615, ONE TIME, Standing Count: 1 Occurrences, R Arnoldo Danielson MD    Diagnosis:  Lumbar stenosis with neurogenic claudication [M48.062]  Specific instructions for next treatment:  Progress gait and stairs as able. Current Treatment Recommendations:   [] Strengthening to improve independence with functional mobility   [] ROM to improve independence with functional mobility   [x] Balance Training to improve static/dynamic balance and to reduce fall risk  [x] Endurance Training to improve activity tolerance during functional mobility   [x] Transfer Training to improve safety and independence with all functional transfers   [x] Gait Training to improve gait mechanics, endurance and asses need for appropriate assistive device  [x] Stair Training in preparation for safe discharge home and/or into the community   [x] Positioning to prevent skin breakdown and contractures  [x] Safety and Education Training   [] Patient/Caregiver Education   [] HEP  [] Other     PT long term treatment goals are located in above grid    Frequency of treatments: 2-5x/week x 1-2 weeks. Time in  0730  Time out  0755    Total Treatment Time  10 minutes     Evaluation Time includes thorough review of current medical information, gathering information on past medical history/social history and prior level of function, completion of standardized testing/informal observation of tasks, assessment of data and education on plan of care and goals.     CPT codes:  [x] Low Complexity PT evaluation 81955  [] Moderate Complexity PT evaluation 84883  [] High Complexity PT evaluation 17091  [] PT Re-evaluation 71862  [] Gait training 23678 - minutes  [] Manual therapy 76021 - minutes  [x] Therapeutic activities 12334 10 minutes  [] Therapeutic exercises 25619 - minutes  [] Neuromuscular reeducation 70311 - minutes     [place], PT, DPT  [id number]

## 2022-03-31 NOTE — PROGRESS NOTES
Acute Rehab Pre-Admission Screen      Referral date: 3/30/22  Onset/Hospital Admit Date: 3/30/2022  5:17 AM    Current Location: [facility]    Name: Annelise Terrell  YOB: 1945  Age: 64 y.o.   Admitting Diagnosis: Lumbar Stenosis with Neurogenic Claudication   Address: [address] [place] [address], [address] [facility]  Home Phone: [phone] (home)  Cande Lou #:     Sex: male  Race:   Marital Status:    Ethnic/Cultural/Worship Considerations: Taoist    Advanced Directives: [x] Full Code  [] [facility] [] Medications only       [] Living Will  [] DPOA      []Organ donor      [] No mechanical breathing or ventilation     [] no tube feeding, nutrition or hydration      [x] Patient does not have advanced directives or living will     COVERAGE INFORMATION   Primary Insurer: [facility] Medicare  Payor Contact: ***  Phone: ***  FAX:***  Authorization #: [id number]  Secondary Insurer: ***  Medicare #: ***  Medicaid #: ***  Verified coverage: [] Patient  [] Family/caregiver    [x] financial department [] insurance carrier    COVID SCREEN DATE:  Result: (negative, positive)      MEDICAL UPDATE:  History of present admission: ***      PHYSICIAN / REFERRAL INFORMATION  Referring Physician: Bailee Willett MD  Attending Physician: Bailee Willett MD  Primary Care Physician: Ashley Jack DO  Consultants/Opinions (see full consult notes on chart): Neurosurgery (lumbar stenosis with neurogenic claudication, schwannoma)    SOCIAL INFORMATION  Primary  Contact: Isa Hong  Relationship: Child  Primary Phone: [phone]  Secondary  Contact: Caio Mckenzie  Relationship: Child  Primary Phone: [phone]    Previous Community Services: none  Caregiver available: [x] Yes [] No Hours per day available: 24/7  Patient previously employed:  [] Yes [] Part Time [] Full Time [] No [x] Retired  Occupation/Profession: retired from [employer]  Prior living arrangements: [x] Home  [] Assisted living  [] SNF [] Other  Lived with:  [] Alone  [x] Spouse  [] Family  [] Other  Lived with: ***  Contact phone: ***  Home:  1 story home  4 entry steps  Rails: 1   Bedroom: [x] 1st floor  [] 2nd floor    Bathroom:  [x] 1st floor  [] 2nd floor    Prior Functional Level: Independent for: ADLS, IADLS, ambulation with no AD, driving  Assistance for: none  Dependent for: none  Dominant hand: [] Right  [x] Left    Previous Home Equipment:  [] Cane [] Grab bars [] Orthotic / prosthetic   [] Shower chair [] Tub bench  [] 3-in-1 Commode [] Long handle sponge   [] Oxygen [] Sock aide  [] Wheelchair  [] motorized wc/scooter  [] Wheelchair cushion   [] Crutches [] Long handle shoehorn  [] Reachers [] Toilet seat elevator [] Rollator  [] Mckenna Josr (wheeled)   [] Mckenna Josr (standard) [] Mechanical lift    [x] None of the above     Has patient had 2 or more falls in the past year or any fall with injury in the past year? [] yes   [] no   [x]unknown    Has patient had major surgery during past 100 days prior to admission?    [x] yes   [] no Type/ Date: L5-S1 posterior lumbar interbody fusion on 3/30/22    Surgical History:  Past Surgical History:   Procedure Laterality Date    ANKLE FRACTURE SURGERY Left 2020    BACK SURGERY      CARDIAC CATHETERIZATION  4/26/2016    Dr Barksdale Hidden GRAFT  4/27/16    x3    DIAGNOSTIC CARDIAC CATH LAB PROCEDURE  04/26/2016    INSERTABLE CARDIAC MONITOR Left 10/11/2016    LINQ loop recorder, [employer], Dr. Milton Pastrana  03/19/2021    removal    LUMBAR SPINE SURGERY N/A 3/30/2022    L5-S1 POSTERIOR LUMBAR INTERBODY FUSION - O ARM, C ARM, JAYDEN TABLE, CELL SAVER, PLATELET GELL, AUDIOLOGY, CAGES, PLATES, GLOBUS performed by Vinh Kang MD at [facility] OR       Past Medical:  Past Medical History:   Diagnosis Date    Anxiety     Asymptomatic bilateral carotid artery stenosis     [facility]el [facility]     CAD (coronary artery disease)     [facility]ch[facility]     Cryptogenic stroke ([facility]     Diabetes mellitus ([facility]     Hyperlipidemia     Hypertension     NSTEMI (non-ST elevated myocardial infarction) ([facility]        Current Co-morbidities:  [] Alzheimer's   [] Dysphasia    [] Parkinsonism  [] Amputation   [] GERD   [] Peripheral artery disease   [] Anemia      [] Encephalopathy  [] Peripheral vascular disease  [x] Anxiety   [] Gangrene   [] Pneumonia  [] Aphasia   [] Gout   [] Polyneuropathy  [] Asthma   [] Heart Failure (diastolic) [] Post-polio syndrome  [] Atrial fibrillation  [] Heart Failure (left-sided) [] Pseudomonas enteritis   [] Blind   [] Heart Failure (right-sided) [] Pulmonary embolism  [] Cellulitis     [] Heart Failure (systolic) [] Renal dialysis  [] Clostridium difficile  [] Hemiparesis  [] Renal failure  [] Congestive heart failure [x] Hypertension  [] Rheumatoid arthritis  [x] COPD   [] Hypotension  [] Seizure disorder   [x] Coronary Artery Disease [] Hypothyroidism  [] Septicemia   [] Deaf   [x] Hyperlipidemia  [] Sleep apnea  [] Depression   [] Morbid obesity  [] Spinal cord injury  [x] Diabetes   [] MRSA   [] Stroke  [] Diabetic nephropathy [x] Myocardial infarction (prior) [] Tracheostomy  [] Diabetic neuropathy [] Osteoarthritis  [] Traumatic brain injury   [] Diabetic retinopathy [] Osteoporosis  [] Urinary tract infection  [] DVT   [] Pancytopenia  [] Vocal cord paralysis  [x] Lumbar Spinal stenosis  [] Kidney disease  [] VRE  [x] Post op - L5-S1 PLIF [x] Neurogenic Claudication []        Medical/Functional Conditions requiring inpatient rehabilitation: Patient has functional deficits in ADLS, mobility, speech, swallow and cognition, impaired balance, and needs ongoing medical management.    Requires multidisciplinary treatment including PM&R physician daily care, 24 hour rehabilitation nursing, physical therapy, occupational therapy, rehabilitation psychology, recreation therapy and rehabilitation social work, nutrition services due to new deficits. Risk for Medical/Clinical Complications: Falls, injury, pain, skin breakdown, abnormal vitals, abnormal labs, DVT, PE, pneumonia, decreased mobility, neuro changes. CLINICAL DATA:     Height : 5'5\"     Weight:  139lbs   BMI: 23       Date: *** Date: *** Date: ***   temperature      pulse      respirations      Blood pressure      Pulse oximeter         ALLERGIES: Patient has no known allergies. DIET : ADULT DIET; Regular; 4 carb choices (60 gm/meal)    Current Lab and Diagnostic Tests:   Recent Results (from the past 24 hour(s))   POCT Glucose    Collection Time: 03/30/22  4:43 PM   Result Value Ref Range    Meter Glucose 154 (H) 74 - 99 mg/dL   POCT Glucose    Collection Time: 03/30/22  9:45 PM   Result Value Ref Range    Meter Glucose 146 (H) 74 - 99 mg/dL   Basic Metabolic Panel    Collection Time: 03/31/22  6:51 AM   Result Value Ref Range    Sodium 136 132 - 146 mmol/L    Potassium 4.6 3.5 - 5.0 mmol/L    Chloride 104 98 - 107 mmol/L    CO2 23 22 - 29 mmol/L    Anion Gap 9 7 - 16 mmol/L    Glucose 135 (H) 74 - 99 mg/dL    BUN 18 6 - 23 mg/dL    CREATININE 1.0 0.7 - 1.2 mg/dL    GFR Non-African American >60 >=60 mL/min/1.73    GFR African American >60     Calcium 8.7 8.6 - 10.2 mg/dL   CBC    Collection Time: 03/31/22  6:51 AM   Result Value Ref Range    WBC 17.9 (H) 4.5 - 11.5 E9/L    RBC 4.21 3.80 - 5.80 E12/L    Hemoglobin 13.1 12.5 - 16.5 g/dL    Hematocrit 40.1 37.0 - 54.0 %    MCV 95.2 80.0 - 99.9 fL    MCH 31.1 26.0 - 35.0 pg    MCHC 32.7 32.0 - 34.5 %    RDW 14.8 11.5 - 15.0 fL    Platelets 069 982 - 440 E9/L    MPV 12.1 (H) 7.0 - 12.0 fL   POCT Glucose    Collection Time: 03/31/22  8:20 AM   Result Value Ref Range    Meter Glucose 119 (H) 74 - 99 mg/dL   POCT Glucose    Collection Time: 03/31/22 11:46 AM   Result Value Ref Range    Meter Glucose 118 (H) 74 - 99 mg/dL     XR CHEST (2 VW)    Result Date: 3/24/2022  Minimal scarring at the right base. Additional labs or diagnostic studies needed before admission to rehabilitation unit:  none    Medications:   atorvastatin  40 mg Oral Daily    empagliflozin  25 mg Oral Daily    gabapentin  300 mg Oral BID    losartan  25 mg Oral Daily    metoprolol tartrate  12.5 mg Oral BID    pantoprazole  40 mg Oral QAM AC    zolpidem  10 mg Oral QPM    sodium chloride flush  5-40 mL IntraVENous 2 times per day    acetaminophen  650 mg Oral Q6H    ceFAZolin (ANCEF) IVPB  2,000 mg IntraVENous Q8H    polyethylene glycol  17 g Oral Daily    bisacodyl  5 mg Oral Daily    sennosides-docusate sodium  1 tablet Oral BID    docusate sodium  200 mg Oral BID    insulin lispro  0-12 Units SubCUTAneous TID WC    insulin lispro  0-6 Units SubCUTAneous Nightly      sodium chloride      sodium chloride 50 mL/hr at 03/30/22 1239    dextrose       sodium chloride flush, sodium chloride, ondansetron **OR** ondansetron, oxyCODONE **OR** oxyCODONE HCl, morphine **OR** morphine, cyclobenzaprine, diphenhydrAMINE **OR** diphenhydrAMINE, bisacodyl, fleet, benzocaine-menthol, glucose, dextrose, glucagon (rDNA), dextrose, promethazine    SPECIAL PRECAUTIONS: [] No current precautions  [] Cardiac  [] Renal [] Sternal [] Respiratory      [] Neurological           [] Hip  [x] Spinal [] Seizure  [] Aspiration  [] Isolation precautions:    [] Contact   [] Respiratory   [] Protective     [] Droplet    [x] Weight Bearing precautions:         [] Non Weight Bearing:         [] Toe Touch Weight Bearing:        [] Partial Weight Bearing:         [x] Weight Bearing as Tolerated:         [x] Fall Risk:   [] Recent history of falls [x] Falls risk level (Mazariegos Scale): high      [] Bed Alarm    [x] Do not leave alone in the bathroom    [] Chair Alarm    [] Cognitive impairment      [] One to One supervision  [] Sitter / Tele sitter   [] Safety enclosure bed  [x] Decreased balance     SPECIAL REHABILITATION NEEDS:   [] IV Therapy: [] PRN Adapter [] Midline  [] PICC      [] Central Line    [] TPN       [] Oxygen: [] Trach [] Bi-PAP [] CPAP  [] Nasal cannula  [] Liters:    [x] Wound Care:   [] Pressure ulcers (stage and location)   [] Wound vac   [x] Wound or incision care - medial back incision    [x] Pain Management (level of pain, meds): Tylenol every 6 hours, Flexeril PRN, Oxycodone IR PRN     [] Incontinence Bladder [] Strong  Insertion date:   [] Incontinence Bowel    [] Last bowel movement: ***    Substance use history: [x] Yes  [] No   [x] Tobacco (former smoker)  [] Alcohol  [] Other     [] Ethnic  [] Cultural  [] Spiritual  [x] Language [] Needs  [x] Other than English - Greek is preferred  [] Hearing Impaired  [] Visually Impaired  [] Speaking Impaired  [] Blind  [] Special equipment:  [] Devices/Splints  [] Type   [x] Brace   [x] Type - LSO brace  [] Bariatric bed  [] Extra wide commode  [] Extra wide wheelchair [] Extra wide walker  [] Tone walker  [] Tone wheelchair  [] Transfer lift    [] Other equipment    FUNCTIONAL STATUS PT / Wets / Jaclyn Esteves:  FIM / EVAL Discipline Initial: 3/31/22 Follow Up: *** Current: ***   Eating OT Independent     Grooming OT Set up     Bathing OT Moderate assist     Dressing Upper Extremity OT Maximal assist     Dressing Lower Extremity OT Maximal assist     Toileting OT NT     Toilet Transfers OT NT     Tub/Shower Transfers OT NT     Homemaking OT NT     Bed Mobility PT Minimal assist     Bed/Wheelchair Transfers PT Minimal assist     Locomotion Walk / Wheelchair  Device:  Distance: PT Minimal assist  35 ft with [employer]     Endurance PT -     Expression SP -     Social Interaction SP -     Problem Solving SP Good     Memory SP Good     Comprehension SP Fair +     Swallowing SP Good     Bowel Management NSG None charted     Bladder Management NSG Urinary catheter       Comments on Functional Status: Able to tolerate 3 hours of therapy per day split into four 45 minute sessions. Spinal precautions.  LSO brace. [x] Able to participate a minimum of 3 hours per day of therapy intervention. Required treatments/services: [x] Rehabilitation nursing [] Dietitian / nurtition                 [x] Case management  [] Respiratory Therapy      [x] Social work   [] Other    Required Therapy:  Therapy Hours per Day Days per Week Therapeutic Interventions Required   [x] Physical Therapy 1.5 5-7 Gait, transfers, Safety, strength, education, endurance   [x] Occupational Therapy 1.5 5-7 ADLs, IADLs, Safety, strength, education, endurance   [] Speech Pathology      [] Prosthetics / Orthotics       []         Anticipated Discharge Plan:   Anticipated DME Needs:  [x] Home     [] Commode   [] Alone    [] Wheelchair   [] Supervised    [] Mariama Shaffer   [] Assist    [] Oxygen        [] Hospital Bed  [] Assisted Living    [] Ramp        [x] To Be Determined    Anticipated Home Health Services:  Anticipated Outpatient Services:  [] PT       [] PT  [] OT      [] OT  [] Speech     [] Speech  [] Nursing     [] Dialysis  [] Aide      [x] To Be Determined  [x] To Be Determined    Anticipated support group:  [] Amputation  [] Multiple Sclerosis  [] Stroke  [] Brain Injury  [] Spinal cord injury  [] Other    Barriers to discharge: Impaired self care, impaired mobility    Discharge Support: [] Patient lives alone and does not have a caregiver available     [] Patient has a caregiver available     [] Discharge plan has been verified with patient's caregiver      [] Caregiver is in agreement with the discharge plan     Expected functional status for safe discharge: Modified independent    Patient/support person goals:  To go home    Expected length of stay: 7-10 days    Discussed expected length of stay and agreeable to IRF plan: [x] Yes   [] No    Impairment Group Category: ***    Etiological Diagnosis: Lumbar Stenosis with Neurogenic Claudication    Primary Rehabilitation Diagnosis: Lumbar Stenosis with Neurogenic Claudication    Electronically signed by Derek Saunders RN on 3/31/2022 at 12:52 PM    Prescreen completed __________________________________ (signature of prescreener)    Date:   *** Time:  ***    JUSTIFICATION FOR ADMISSION TO ACUTE REHABILITATION:  Patient has suffered decline in functional abilities for gait, transfers, ADL's and IADL's as well as endurance. Patient has functional deficits requiring intensive therapy across multiple disciplines in order to return home safely. Patient will need physician oversight for respiratory issues, abnormal vital signs, nutritional and hydration status, safety issues, medications and therapy modalities. PT and OT will work on deficits as noted in evaluations. Case management and social work will provide services for DME and management of a safe discharge home.       RECOMMEND LEVEL OF CARE  Recommend inpatient rehabilitation: [] Yes   [] No  If no indicate reason:  [] Functional level too high  [] Unmotivated  [] No insurance carrier approval [] Unlikely to return to community  [] No medical necessity  [] Patient or family chose other facility  [] Too medically complex  [] Inadequate discharge plan  [] Rehabilitation bed unavailable [] Functional level too low  [] patient or family refused ARU    If patient not accepted for IRF admission, recommended level of care:  [] [address]  [] [facility]  [] [place]   [] Home Care  [] Other      [] LTAC       Physician Assigned:  [x] Dr. Shannan Wyman         [] Dr. Graham Bean              [] Dr. Madhu Lopes [] Dr. Suzette Messer  [] Dr. Alberto Jack REVIEW: ____________________________________________________________________  ____________________________________________________________________  ____________________________________________________________________  ____________________________________________________________________  ____________________________________________________________________      Physician Signature:_____________________________________    Print Signature:_________________________________________    Date:   *** Time:    ***    PRE-SCREEN ASSESSMENT UPDATE (if not admitted within 48 hours of initial pre-screen)    Medical Update/Changes: Prescreen updated to reflect current data since initiated. Functional Update/Changes: Therapy notes updated on prescreen graph to reflect current status.     Reviewer Signature:_____________________________________    Date:  *** Time: ***    PHYSICIAN ADMISSION DETERMINATION AND REVIEW UPDATE:     ____________________________________________________________________  ____________________________________________________________________  ____________________________________________________________________  ____________________________________________________________________  ____________________________________________________________________    Physician Signature:_____________________________________    Print Signature:_________________________________________    Date: ***    Time:  ***      *** Be sure all blanks are filled in and then delete this phrase

## 2022-03-31 NOTE — PRE-CERTIFICATION NOTE
Case reviewed with Dr. Aleida Abbott. Patient is appropriate for ARU. Precert initiated and clinicals faxed to [facility] at this time.     Camilo Leary RN, Pre-screener for Acute Rehab  P: [phone]

## 2022-03-31 NOTE — PROGRESS NOTES
Department of Neurosurgery  Progress Note    CHIEF COMPLAINT: s/p lumbar fusion    SUBJECTIVE:  roma op site pain ok    REVIEW OF SYSTEMS :  Constitutional: Negative for chills and fever. Neurological: Negative for dizziness, tremors and speech change.      OBJECTIVE:   VITALS:  BP (!) 150/59   Pulse 76   Temp 98.1 °F (36.7 °C) (Temporal)   Resp 16   Ht 5' 5\" (1.651 m)   Wt 139 lb (63 kg)   SpO2 97%   BMI 23.13 kg/m²   PHYSICAL:  CONSTITUTIONAL:  awake, alert, cooperative, no apparent distress, and appears stated age    DATA:  CBC:   Lab Results   Component Value Date    WBC 17.9 03/31/2022    RBC 4.21 03/31/2022    HGB 13.1 03/31/2022    HCT 40.1 03/31/2022    MCV 95.2 03/31/2022    MCH 31.1 03/31/2022    MCHC 32.7 03/31/2022    RDW 14.8 03/31/2022     03/31/2022    MPV 12.1 03/31/2022     BMP:    Lab Results   Component Value Date     03/31/2022    K 4.6 03/31/2022    K 3.9 03/24/2022     03/31/2022    CO2 23 03/31/2022    BUN 18 03/31/2022    LABALBU 3.8 11/05/2021    LABALBU 3.9 03/17/2011    CREATININE 1.0 03/31/2022    CALCIUM 8.7 03/31/2022    GFRAA >60 03/31/2022    LABGLOM >60 03/31/2022    GLUCOSE 135 03/31/2022    GLUCOSE 109 03/21/2011     PT/INR:    Lab Results   Component Value Date    PROTIME 11.1 03/24/2022    PROTIME 11.8 03/17/2011    INR 1.0 03/24/2022     PTT:    Lab Results   Component Value Date    APTT 30.2 07/17/2020   [APTT}    Current Inpatient Medications  Current Facility-Administered Medications: atorvastatin (LIPITOR) tablet 40 mg, 40 mg, Oral, Daily  empagliflozin (JARDIANCE) tablet TABS 25 mg, 25 mg, Oral, Daily  gabapentin (NEURONTIN) capsule 300 mg, 300 mg, Oral, BID  losartan (COZAAR) tablet 25 mg, 25 mg, Oral, Daily  metoprolol tartrate (LOPRESSOR) tablet 12.5 mg, 12.5 mg, Oral, BID  pantoprazole (PROTONIX) tablet 40 mg, 40 mg, Oral, QAM AC  zolpidem (AMBIEN) tablet 10 mg, 10 mg, Oral, QPM  sodium chloride flush 0.9 % injection 5-40 mL, 5-40 mL, IntraVENous, 2 times per day  sodium chloride flush 0.9 % injection 5-40 mL, 5-40 mL, IntraVENous, PRN  0.9 % sodium chloride infusion, 25 mL, IntraVENous, PRN  acetaminophen (TYLENOL) tablet 650 mg, 650 mg, Oral, Q6H  ondansetron (ZOFRAN-ODT) disintegrating tablet 4 mg, 4 mg, Oral, Q8H PRN **OR** ondansetron (ZOFRAN) injection 4 mg, 4 mg, IntraVENous, Q6H PRN  0.9 % sodium chloride infusion, , IntraVENous, Continuous  ceFAZolin (ANCEF) 2000 mg in sterile water 20 mL IV syringe, 2,000 mg, IntraVENous, Q8H  oxyCODONE (ROXICODONE) immediate release tablet 5 mg, 5 mg, Oral, Q4H PRN **OR** oxyCODONE HCl (OXY-IR) immediate release tablet 10 mg, 10 mg, Oral, Q4H PRN  morphine (PF) injection 2 mg, 2 mg, IntraVENous, Q2H PRN **OR** morphine sulfate (PF) injection 4 mg, 4 mg, IntraVENous, Q2H PRN  cyclobenzaprine (FLEXERIL) tablet 10 mg, 10 mg, Oral, TID PRN  diphenhydrAMINE (BENADRYL) tablet 25 mg, 25 mg, Oral, Q6H PRN **OR** diphenhydrAMINE (BENADRYL) injection 25 mg, 25 mg, IntraVENous, Q6H PRN  polyethylene glycol (GLYCOLAX) packet 17 g, 17 g, Oral, Daily  bisacodyl (DULCOLAX) EC tablet 5 mg, 5 mg, Oral, Daily  sennosides-docusate sodium (SENOKOT-S) 8.6-50 MG tablet 1 tablet, 1 tablet, Oral, BID  bisacodyl (DULCOLAX) suppository 10 mg, 10 mg, Rectal, Daily PRN  fleet rectal enema 1 enema, 1 enema, Rectal, Daily PRN  benzocaine-menthol (CEPACOL SORE THROAT) lozenge 1 lozenge, 1 lozenge, Oral, Q2H PRN  docusate sodium (COLACE) capsule 200 mg, 200 mg, Oral, BID  insulin lispro (HUMALOG) injection vial 0-12 Units, 0-12 Units, SubCUTAneous, TID WC  insulin lispro (HUMALOG) injection vial 0-6 Units, 0-6 Units, SubCUTAneous, Nightly  glucose (GLUTOSE) 40 % oral gel 15 g, 15 g, Oral, PRN  dextrose 50 % IV solution, 12.5 g, IntraVENous, PRN  glucagon (rDNA) injection 1 mg, 1 mg, IntraMUSCular, PRN  dextrose 5 % solution, 100 mL/hr, IntraVENous, PRN  promethazine (PHENERGAN) in sodium chloride 0.9% 50 mL IVPB 25 mg, 25 mg, IntraVENous, Q8H PRN    ASSESSMENT:   · S/p L5-S1 PLIF on 3/30 - stable  · IV pain control  · Drain management    PLAN:  · Admit  · PT/OT  · WBAT with brace  · D/c bernardo   · Pain management      Electronically signed by DANIELA Mitchell on 3/31/2022 at 9:04 AM

## 2022-03-31 NOTE — PROGRESS NOTES
[address]      Date:3/31/2022                                                Patient Name: Troy Sandoval  MRN: [id number]  : 1945  Room: [address] [place] [address]     Evaluating OT:Carla Auguste OTR/L   License #  [id number]       Referring Provider:       Arlen Lara MD       Specific Provider Orders/Date: OT evaluation & treatment        Diagnosis: LUMBAR STENOSIS    Pertinent Medical History:  has a past medical history of Anxiety, Asymptomatic bilateral carotid artery stenosis, Bowel obstruction ([facility] [address] CAD (coronary artery disease), COPD (chronic obstructive pulmonary disease) ([facility] [address] Cryptogenic stroke ([facility] [address] Diabetes mellitus ([facility] [address] Hyperlipidemia, Hypertension, and NSTEMI (non-ST elevated myocardial infarction) ([facility] [address] Surgery: 3-30-22: L5-S1 POSTERIOR LUMBAR INTERBODY FUSION - O ARM, C ARM, JAYDEN TABLE, CELL SAVER, PLATELET GELL, AUDIOLOGY, CAGES, PLATES, GLOBUS    Past Surgical History:  has a past surgical history that includes colectomy; Cardiac catheterization (2016); Coronary artery bypass graft (16); Diagnostic Cardiac Cath Lab Procedure (2016); Insertable Cardiac Monitor (Left, 10/11/2016); back surgery; Insertable Cardiac Monitor (2021); Ankle fracture surgery (Left, ); and Lumbar spine surgery (N/A, 3/30/2022).        Precautions:  Fall Risk, neutral spine (no B,L,T), LSO brace, drain, bernardo, IV      Assessment of current deficits    [x] Functional mobility            [x]ADLs           [x] Strength                  [x]Cognition    [x] Functional transfers          [x] IADLs         [x] Safety Awareness   [x]Endurance    [x] Fine Coordination                         [x] Balance      [] Vision/perception   [x]Sensation      []Gross Motor Coordination             [] ROM           [] Delirium                   [] Motor Control      OT PLAN OF CARE   OT POC based on physician orders, patient diagnosis and results of clinical assessment     Frequency/Duration: 2-4 days/wk for 2 weeks PRN   Specific OT Treatment Interventions to include: Instruction/training on adapted ADL techniques and AE recommendations to increase functional independence within precautions  Training on energy conservation strategies, correct breathing pattern and techniques to improve independence/tolerance for self-care routine  Functional transfer/mobility training/DME recommendations for increased independence, safety, and fall prevention  Patient/Family education to increase follow through with safety techniques and functional independence  Recommendation of environmental modifications for increased safety with functional transfers/mobility and ADLs  Cognitive retraining/development of therapeutic activities to improve problem solving, judgement, memory, and attention for increased safety/participation in ADL/IADL tasks  Therapeutic exercise to improve motor endurance, ROM, and functional strength for ADLs/functional transfers  Therapeutic activities to facilitate/challenge dynamic balance, stand tolerance for increased safety and independence with ADLs  Therapeutic activities to facilitate gross/fine motor skills for increased independence with ADLs  Positioning to improve skin integrity, interaction with environment and functional independence     Recommended Adaptive Equipment:  TBD      Home Living: Pt lives with wife in a 1 story with 4 steps to enter with 1 HR. B&B on main level. Full flight to basement with HR  Bathroom setup: walk in shower, std. commode  Equipment owned: none     Prior Level of Function: Ind. with ADLs , Ind. with IADLs; ambulated no A.D.   Driving: active  Occupation: retired from [employer]     Pain Level: 5/10  Cognition: A&O: abdomen/4; Follows multi- step directions              Memory:  good              Sequencing:  good              Problem solving:  good              Judgement/safety:  fair+                Functional Assessment:  AM-PAC Daily Activity Raw Score: 16/24    Initial Eval Status  Date: 3-31-22 Treatment Status  Date: STGs = LTGs  Time frame: 10-14 days   Feeding Ind. Mod I/ Ind   Grooming Set up   Modified [place]    UB Dressing Max A with gown & LSO   Modified [place]    LB Dressing Max A with figure 4 technique   Modified [place]    Bathing Mod A with sim. task   Modified [place]    Toileting NT, bernardo   Modified [place]    Bed Mobility  Logroll: Min A  Supine to sit: Min A  Sit to supine: NT   Supine to sit: Modified [place]   Sit to supine: Modified [place]    Functional Transfers Min A with sit <> stand, SPT with ww   Modified [place]    Functional Mobility Min A with ww functional home distances   Modified [place]    Balance Sitting:     Static:  Sup    Dynamic:SBA  Standing: Min A       Activity Tolerance Fair with lt. ax. Good with mod. ax. Visual/  Perceptual Glasses: readers          Vitals spO2 91-96% on RA  1. BP: 156/56  2. /67   WFL      Hand Dominance L    AROM (PROM) Strength Additional Info:    RUE  WFL 4/5 good  and wfl FMC/dexterity noted during ADL tasks      LUE WFL 4/5 good  and wfl FMC/dexterity noted during ADL tasks         Hearing: [facility]   Sensation:  No c/o numbness or tingling B UE  Tone: WFL B UE  Edema: none noted B UE     Comments: Upon arrival patient supine in bed, agreeable to OT, cleared by Nursing. Therapist facilitated bed mobility/ADLs/ brace instruction with LSO donning/doffing with focus on safety, technique & precautions. Pt. Instructed RE: safe transfers/mobility, ADLs, role of OT, treatment plan, recs. , prec. At end of session, patient seated in bedside chair, breakfast available, all needs met, RN notified, with call light and phone within reach, all lines and tubes intact.   Overall patient demonstrated decreased strength, balance, independence & safety during completion of ADL/functional transfer/mobility tasks. Pt would benefit from continued skilled OT to increase safety and independence with completion of ADL/IADL tasks for functional independence and quality of life. Treatment: OT treatment provided this date includes:  ·  Instruction/training on safety and adapted techniques for completion of ADLs: to increase [place] in self care  ·  Instruction/training on safe functional mobility/transfer techniques: with focus on safety, technique & precautions  ·  Instruction/training on energy conservation/work simplification for completion of ADLs: techniques to increase [place] with self care ADLs & iADLs, work simplification to improve endurance  ·  Proper Positioning/Alignment: for optimal healing, skin integrity to prevent breakdown, decrease edema  · Skilled monitoring of vitals: to include BP, spO2 & HR during session  · Sitting/standing Balance/Tolerance- to increased balance & activity tolerance during ADLs as well as facilitate proper posture and/or positioning. · Therapeutic exercise- Instruction on B UE ROM exercises to improve strength/function for increased Reynolds with ADLs & iADLs     Rehab Potential: Good for established goals     Patient / Family Goal: to return home      Patient and/or family were instructed on functional diagnosis, prognosis/goals and OT plan of care. Demonstrated good understanding.       Eval Complexity: Low     Time In: 7:30  Time Out: 7:53  Total Treatment Time: 08    Min Units   OT Eval Low 55767  x     OT Eval Medium 13498       OT Eval High 44499       OT Re-Eval [id number]       Therapeutic Ex 71436       Therapeutic Activities 70601       ADL/Self Care 89606  08  1   Orthotic Management 78596       Manual 75762       Neuro Re-Ed 62378       Non-Billable Time          Evaluation Time additionally includes thorough review of current medical information, gathering information on past medical history/social history and prior level of function, interpretation of standardized testing/informal observation of tasks, assessment of data and development of plan of care and goals. Carla Auguste, OTR/L   License #  [id number]

## 2022-04-01 ENCOUNTER — APPOINTMENT (OUTPATIENT)
Dept: GENERAL RADIOLOGY | Age: 77
DRG: 454 | End: 2022-04-01
Attending: NEUROLOGICAL SURGERY
Payer: MEDICARE

## 2022-04-01 LAB
METER GLUCOSE: 113 MG/DL (ref 74–99)
METER GLUCOSE: 119 MG/DL (ref 74–99)
METER GLUCOSE: 121 MG/DL (ref 74–99)
METER GLUCOSE: 138 MG/DL (ref 74–99)

## 2022-04-01 PROCEDURE — 6370000000 HC RX 637 (ALT 250 FOR IP): Performed by: PHYSICIAN ASSISTANT

## 2022-04-01 PROCEDURE — 6370000000 HC RX 637 (ALT 250 FOR IP): Performed by: NEUROLOGICAL SURGERY

## 2022-04-01 PROCEDURE — 97535 SELF CARE MNGMENT TRAINING: CPT

## 2022-04-01 PROCEDURE — 82962 GLUCOSE BLOOD TEST: CPT

## 2022-04-01 PROCEDURE — 74018 RADEX ABDOMEN 1 VIEW: CPT

## 2022-04-01 PROCEDURE — 51798 US URINE CAPACITY MEASURE: CPT

## 2022-04-01 PROCEDURE — 2500000003 HC RX 250 WO HCPCS: Performed by: NEUROLOGICAL SURGERY

## 2022-04-01 PROCEDURE — 6360000002 HC RX W HCPCS: Performed by: NEUROLOGICAL SURGERY

## 2022-04-01 PROCEDURE — 2580000003 HC RX 258: Performed by: NEUROLOGICAL SURGERY

## 2022-04-01 PROCEDURE — 1200000000 HC SEMI PRIVATE

## 2022-04-01 PROCEDURE — 97530 THERAPEUTIC ACTIVITIES: CPT

## 2022-04-01 RX ORDER — SODIUM PHOSPHATE, DIBASIC AND SODIUM PHOSPHATE, MONOBASIC 7; 19 G/133ML; G/133ML
1 ENEMA RECTAL ONCE
Status: COMPLETED | OUTPATIENT
Start: 2022-04-01 | End: 2022-04-01

## 2022-04-01 RX ORDER — POLYETHYLENE GLYCOL 3350 17 G/17G
17 POWDER, FOR SOLUTION ORAL 2 TIMES DAILY
Status: DISCONTINUED | OUTPATIENT
Start: 2022-04-02 | End: 2022-04-05 | Stop reason: HOSPADM

## 2022-04-01 RX ORDER — OXYCODONE HYDROCHLORIDE 5 MG/1
5 TABLET ORAL EVERY 4 HOURS PRN
Qty: 42 TABLET | Refills: 0 | Status: SHIPPED | OUTPATIENT
Start: 2022-04-01 | End: 2022-04-08

## 2022-04-01 RX ORDER — BISACODYL 10 MG
10 SUPPOSITORY, RECTAL RECTAL DAILY
Status: DISCONTINUED | OUTPATIENT
Start: 2022-04-02 | End: 2022-04-05 | Stop reason: HOSPADM

## 2022-04-01 RX ORDER — CYCLOBENZAPRINE HCL 10 MG
10 TABLET ORAL 3 TIMES DAILY PRN
Qty: 30 TABLET | Refills: 0 | Status: SHIPPED | OUTPATIENT
Start: 2022-04-01 | End: 2022-04-13 | Stop reason: SDUPTHER

## 2022-04-01 RX ORDER — SODIUM PHOSPHATE, DIBASIC AND SODIUM PHOSPHATE, MONOBASIC 7; 19 G/133ML; G/133ML
1 ENEMA RECTAL DAILY
Status: DISCONTINUED | OUTPATIENT
Start: 2022-04-02 | End: 2022-04-05 | Stop reason: HOSPADM

## 2022-04-01 RX ORDER — TAMSULOSIN HYDROCHLORIDE 0.4 MG/1
0.4 CAPSULE ORAL DAILY
Status: DISCONTINUED | OUTPATIENT
Start: 2022-04-01 | End: 2022-04-05 | Stop reason: HOSPADM

## 2022-04-01 RX ADMIN — Medication 2000 MG: at 16:16

## 2022-04-01 RX ADMIN — POLYETHYLENE GLYCOL 3350 17 G: 17 POWDER, FOR SOLUTION ORAL at 09:57

## 2022-04-01 RX ADMIN — ATORVASTATIN CALCIUM 40 MG: 40 TABLET, FILM COATED ORAL at 09:56

## 2022-04-01 RX ADMIN — DOCUSATE SODIUM 200 MG: 100 CAPSULE, LIQUID FILLED ORAL at 09:57

## 2022-04-01 RX ADMIN — Medication 2000 MG: at 06:36

## 2022-04-01 RX ADMIN — Medication 10 ML: at 21:25

## 2022-04-01 RX ADMIN — METOPROLOL TARTRATE 12.5 MG: 25 TABLET, FILM COATED ORAL at 09:56

## 2022-04-01 RX ADMIN — GABAPENTIN 300 MG: 300 CAPSULE ORAL at 09:56

## 2022-04-01 RX ADMIN — Medication 10 ML: at 09:56

## 2022-04-01 RX ADMIN — LOSARTAN POTASSIUM 25 MG: 25 TABLET, FILM COATED ORAL at 09:57

## 2022-04-01 RX ADMIN — CYCLOBENZAPRINE 10 MG: 10 TABLET, FILM COATED ORAL at 09:57

## 2022-04-01 RX ADMIN — PANTOPRAZOLE SODIUM 40 MG: 40 TABLET, DELAYED RELEASE ORAL at 06:36

## 2022-04-01 RX ADMIN — TAMSULOSIN HYDROCHLORIDE 0.4 MG: 0.4 CAPSULE ORAL at 16:16

## 2022-04-01 RX ADMIN — SENNOSIDES AND DOCUSATE SODIUM 1 TABLET: 8.6; 5 TABLET ORAL at 09:57

## 2022-04-01 RX ADMIN — ACETAMINOPHEN 650 MG: 325 TABLET ORAL at 12:26

## 2022-04-01 RX ADMIN — MAGNESIUM CITRATE 296 ML: 1.75 LIQUID ORAL at 10:03

## 2022-04-01 RX ADMIN — Medication 2000 MG: at 21:24

## 2022-04-01 RX ADMIN — OXYCODONE HYDROCHLORIDE 10 MG: 10 TABLET ORAL at 06:36

## 2022-04-01 RX ADMIN — ACETAMINOPHEN 650 MG: 325 TABLET ORAL at 16:16

## 2022-04-01 RX ADMIN — SODIUM PHOSPHATE, DIBASIC AND SODIUM PHOSPHATE, MONOBASIC 1 ENEMA: 7; 19 ENEMA RECTAL at 11:58

## 2022-04-01 RX ADMIN — BISACODYL 5 MG: 5 TABLET, COATED ORAL at 09:56

## 2022-04-01 ASSESSMENT — PAIN DESCRIPTION - LOCATION
LOCATION: BACK
LOCATION: BACK

## 2022-04-01 ASSESSMENT — PAIN DESCRIPTION - PAIN TYPE
TYPE: SURGICAL PAIN
TYPE: SURGICAL PAIN

## 2022-04-01 ASSESSMENT — ENCOUNTER SYMPTOMS
CONSTIPATION: 0
ABDOMINAL DISTENTION: 1
COLOR CHANGE: 0
DIARRHEA: 0
VOMITING: 0
ABDOMINAL PAIN: 1
SHORTNESS OF BREATH: 0
NAUSEA: 0

## 2022-04-01 ASSESSMENT — PAIN DESCRIPTION - DESCRIPTORS
DESCRIPTORS: ACHING
DESCRIPTORS: ACHING;DISCOMFORT

## 2022-04-01 ASSESSMENT — PAIN SCALES - GENERAL
PAINLEVEL_OUTOF10: 7
PAINLEVEL_OUTOF10: 3
PAINLEVEL_OUTOF10: 6
PAINLEVEL_OUTOF10: 2
PAINLEVEL_OUTOF10: 0

## 2022-04-01 NOTE — PROGRESS NOTES
Department of Neurosurgery  Progress Note    CHIEF COMPLAINT: s/p lumbar fusion    SUBJECTIVE:  Nemo op site pain okay. Denies BM. No new issues overnight. REVIEW OF SYSTEMS :  Constitutional: Negative for chills and fever. Neurological: Negative for dizziness, tremors and speech change.      OBJECTIVE:   VITALS:  BP (!) 136/58   Pulse 91   Temp 97.6 °F (36.4 °C) (Temporal)   Resp 18   Ht 5' 5\" (1.651 m)   Wt 139 lb (63 kg)   SpO2 91%   BMI 23.13 kg/m²   PHYSICAL:  CONSTITUTIONAL:  awake, alert, cooperative, no apparent distress, and appears stated age    DATA:  CBC:   Lab Results   Component Value Date    WBC 17.9 03/31/2022    RBC 4.21 03/31/2022    HGB 13.1 03/31/2022    HCT 40.1 03/31/2022    MCV 95.2 03/31/2022    MCH 31.1 03/31/2022    MCHC 32.7 03/31/2022    RDW 14.8 03/31/2022     03/31/2022    MPV 12.1 03/31/2022     BMP:    Lab Results   Component Value Date     03/31/2022    K 4.6 03/31/2022    K 3.9 03/24/2022     03/31/2022    CO2 23 03/31/2022    BUN 18 03/31/2022    LABALBU 3.8 11/05/2021    LABALBU 3.9 03/17/2011    CREATININE 1.0 03/31/2022    CALCIUM 8.7 03/31/2022    GFRAA >60 03/31/2022    LABGLOM >60 03/31/2022    GLUCOSE 135 03/31/2022    GLUCOSE 109 03/21/2011     PT/INR:    Lab Results   Component Value Date    PROTIME 11.1 03/24/2022    PROTIME 11.8 03/17/2011    INR 1.0 03/24/2022     PTT:    Lab Results   Component Value Date    APTT 30.2 07/17/2020   [APTT}    Current Inpatient Medications  Current Facility-Administered Medications: fleet rectal enema 1 enema, 1 enema, Rectal, Once  atorvastatin (LIPITOR) tablet 40 mg, 40 mg, Oral, Daily  empagliflozin (JARDIANCE) tablet TABS 25 mg, 25 mg, Oral, Daily  gabapentin (NEURONTIN) capsule 300 mg, 300 mg, Oral, BID  losartan (COZAAR) tablet 25 mg, 25 mg, Oral, Daily  metoprolol tartrate (LOPRESSOR) tablet 12.5 mg, 12.5 mg, Oral, BID  pantoprazole (PROTONIX) tablet 40 mg, 40 mg, Oral, QAM AC  zolpidem (AMBIEN) tablet 10 mg, 10 mg, Oral, QPM  sodium chloride flush 0.9 % injection 5-40 mL, 5-40 mL, IntraVENous, 2 times per day  sodium chloride flush 0.9 % injection 5-40 mL, 5-40 mL, IntraVENous, PRN  0.9 % sodium chloride infusion, 25 mL, IntraVENous, PRN  acetaminophen (TYLENOL) tablet 650 mg, 650 mg, Oral, Q6H  ondansetron (ZOFRAN-ODT) disintegrating tablet 4 mg, 4 mg, Oral, Q8H PRN **OR** ondansetron (ZOFRAN) injection 4 mg, 4 mg, IntraVENous, Q6H PRN  0.9 % sodium chloride infusion, , IntraVENous, Continuous  ceFAZolin (ANCEF) 2000 mg in sterile water 20 mL IV syringe, 2,000 mg, IntraVENous, Q8H  oxyCODONE (ROXICODONE) immediate release tablet 5 mg, 5 mg, Oral, Q4H PRN **OR** oxyCODONE HCl (OXY-IR) immediate release tablet 10 mg, 10 mg, Oral, Q4H PRN  morphine (PF) injection 2 mg, 2 mg, IntraVENous, Q2H PRN **OR** morphine sulfate (PF) injection 4 mg, 4 mg, IntraVENous, Q2H PRN  cyclobenzaprine (FLEXERIL) tablet 10 mg, 10 mg, Oral, TID PRN  diphenhydrAMINE (BENADRYL) tablet 25 mg, 25 mg, Oral, Q6H PRN **OR** diphenhydrAMINE (BENADRYL) injection 25 mg, 25 mg, IntraVENous, Q6H PRN  polyethylene glycol (GLYCOLAX) packet 17 g, 17 g, Oral, Daily  bisacodyl (DULCOLAX) EC tablet 5 mg, 5 mg, Oral, Daily  sennosides-docusate sodium (SENOKOT-S) 8.6-50 MG tablet 1 tablet, 1 tablet, Oral, BID  bisacodyl (DULCOLAX) suppository 10 mg, 10 mg, Rectal, Daily PRN  fleet rectal enema 1 enema, 1 enema, Rectal, Daily PRN  benzocaine-menthol (CEPACOL SORE THROAT) lozenge 1 lozenge, 1 lozenge, Oral, Q2H PRN  docusate sodium (COLACE) capsule 200 mg, 200 mg, Oral, BID  insulin lispro (HUMALOG) injection vial 0-12 Units, 0-12 Units, SubCUTAneous, TID WC  insulin lispro (HUMALOG) injection vial 0-6 Units, 0-6 Units, SubCUTAneous, Nightly  glucose (GLUTOSE) 40 % oral gel 15 g, 15 g, Oral, PRN  dextrose 50 % IV solution, 12.5 g, IntraVENous, PRN  glucagon (rDNA) injection 1 mg, 1 mg, IntraMUSCular, PRN  dextrose 5 % solution, 100 mL/hr, IntraVENous, PRN  promethazine (PHENERGAN) in sodium chloride 0.9% 50 mL IVPB 25 mg, 25 mg, IntraVENous, Q8H PRN    ASSESSMENT:   · S/p L5-S1 PLIF on 3/30 - stable  · IV pain control  · Drain management    PLAN:  · Admit  · PT/OT  · WBAT with brace  · Enema  · Pain management  · Drain care. Plan to remove tomorrow.   · Rehab placement planning  · Follow up in neurosurgery clinic in 2 weeks for staple removal and in 4 weeks with x-rays      Electronically signed by Lamar OfficerJAYDE on 4/1/2022 at 10:12 AM

## 2022-04-01 NOTE — PROGRESS NOTES
Occupational Therapy  OT BEDSIDE TREATMENT NOTE   [facility] [address] [facility]  [address] [place] [address]  Patient Name: Leatha Renteria  MRN: [id number]  : 1945  Room: [address] [place] [address]     Per OT Eval:    Evaluating OT:Carla Auguste, OTR/L   License #  [id number]        Referring Provider:        Patsy Chavez MD        Specific Provider Orders/Date: OT evaluation & treatment        Diagnosis: LUMBAR STENOSIS    Pertinent Medical History:  has a past medical history of Anxiety, Asymptomatic bilateral carotid artery stenosis, Bowel obstruction ([facility] [address] CAD (coronary artery disease), COPD (chronic obstructive pulmonary disease) ([facility] [address] Cryptogenic stroke ([facility] [address] Diabetes mellitus ([facility] [address] Hyperlipidemia, Hypertension, and NSTEMI (non-ST elevated myocardial infarction) ([facility] [address] ZFMBYOL: 27: L5-S1 POSTERIOR LUMBAR INTERBODY FUSION - O ARM, C ARM, JAYDEN TABLE, CELL SAVER, PLATELET GELL, AUDIOLOGY, CAGES, PLATES, GLOBUS    Past Surgical History:  has a past surgical history that includes colectomy; Cardiac catheterization (2016); Coronary artery bypass graft (16); Diagnostic Cardiac Cath Lab Procedure (2016); Insertable Cardiac Monitor (Left, 10/11/2016); back surgery; Insertable Cardiac Monitor (2021); Ankle fracture surgery (Left, ); and Lumbar spine surgery (N/A, 3/30/2022).      Precautions:  Fall Risk, neutral spine (no B,L,T), LSO brace, drain, bernardo, IV      Assessment of current deficits    [x]? ? Functional mobility            [x]? ?ADLs           [x]? ? Strength                  [x]? ? Cognition    [x]? ? Functional transfers          [x]? ? IADLs         [x]? ? Safety Awareness   [x]? ?Endurance    [x]? ? Fine Coordination                         [x]? ? Balance      []? ? Vision/perception   [x]? ? Sensation      []? ?Gross Motor Coordination             []? ? ROM           []? ? Delirium                   []? ? Motor Control    OT PLAN OF CARE   OT POC based on physician orders, patient diagnosis and results of clinical assessment     Frequency/Duration: 2-4 days/wk for 2 weeks PRN   Specific OT Treatment Interventions to include:   Instruction/training on adapted ADL techniques and AE recommendations to increase functional independence within precautions  Training on energy conservation strategies, correct breathing pattern and techniques to improve independence/tolerance for self-care routine  Functional transfer/mobility training/DME recommendations for increased independence, safety, and fall prevention  Patient/Family education to increase follow through with safety techniques and functional independence  Recommendation of environmental modifications for increased safety with functional transfers/mobility and ADLs  Cognitive retraining/development of therapeutic activities to improve problem solving, judgement, memory, and attention for increased safety/participation in ADL/IADL tasks  Therapeutic exercise to improve motor endurance, ROM, and functional strength for ADLs/functional transfers  Therapeutic activities to facilitate/challenge dynamic balance, stand tolerance for increased safety and independence with ADLs  Therapeutic activities to facilitate gross/fine motor skills for increased independence with ADLs  Positioning to improve skin integrity, interaction with environment and functional independence     Recommended Adaptive Equipment:  TBD      Home Living: Pt lives with wife in a 1 story with 4 steps to enter with 1 HR.  B&B on main level. Full flight to basement with HR  Bathroom setup: walk in shower, std. commode  Equipment owned: none     Prior Level of Function: Ind. with ADLs , Ind. with IADLs; ambulated no A.D.   Driving: active  Occupation: retired from [employer]     Pain Level: Pt complained of minimal back pain this session  Cognition: A&O: abdomen/4; Follows multi- step directions              Memory:  good              Sequencing:  good              Problem solving:  good              Judgement/safety:  fair+                Functional Assessment:  AM-PAC Daily Activity Raw Score: 17/24    Initial Eval Status  Date: 3-31-22 Treatment Status  Date: 4/1/22 STGs = LTGs  Time frame: 10-14 days   Feeding Ind.  Independent Mod I/ Ind   Grooming Set up  Setup  Pt washed face, combed hair, brushed teeth seated in chair at sink Modified [place]    UB Dressing Max A with gown & LSO  modA  Pt donned/doffed hospital gown, with assistance to batool LSO brace Modified [place]    LB Dressing Max A with figure 4 technique  Tony-with AE  Pt donned/doffed hospital socks with use of reacher and sockaide seated upright in chair    maxA-noAE  Pt declining need of AE at home, stating wife will assist Modified [place]    Bathing Mod A with sim. task [place]  Pt completed sponge bathing task seated/standing, with pt able to wash of UB, stand and wash of buttocks/cristine area with CGA for safety, with assistance to wash of LE's  Modified [place]    Toileting tova JIMENEZ DNT  Modified [place]    Bed Mobility  Logroll: Min A  Supine to sit: Min A  Sit to supine: NT  DNT  Pt seated upright in chair upon arrival and at end of session Supine to sit: Modified [place]   Sit to supine: Modified [place]    Functional Transfers Min A with sit <> stand, SPT with ww  Tony/CGA  Sit to Stand  Stand to Sit    Cueing for hand placement Modified [place]    Functional Mobility Min A with ww functional home distances min/CGA  Pt ambulated short household distance in room Chair<>Bathroom with w.w.  Modified [place]    Balance Sitting:     Static:  Sup    Dynamic:SBA  Standing: Min A Sitting:  Independent    Standing:  Tony/CGA      Activity Tolerance Fair with lt. ax.  Fair- Good with mod. ax. Visual/  Perceptual Glasses: readers          Vitals spO2 91-96% on RA  1. BP: 156/56  2.  /67   WFL      Hand Dominance L    AROM (PROM) Strength Additional Info:    RUE  WFL 4/5 good  and wfl FMC/dexterity noted during ADL tasks      LUE WFL 4/5 good  and wfl FMC/dexterity noted during ADL tasks         Hearing: WFL   Sensation:  No c/o numbness or tingling B UE  Tone: WFL B UE  Edema: none noted B UE        Education:  Pt was educated on role of OT, goals to be reached, importance of OOB activity, precautions to follow, safety and hand placement with transfers, safety and walker management with functional mobility, and use of AE to assist with LB dressing/bathing tasks. Comments: Upon arrival pt seated upright in chair, brace not donned, agreeable to therapy. Pt educated on need for having brace on any time he is up and OOB, pt stating \"yes\". Pt completed of transfers, functional mobility, and ADL tasks this session. At end of session, pt seated upright in chair, all lines and tubes intact, call light within reach. · Pt has made fair progress towards set goals.    · Continue with current plan of care focusing on increasing of independency with transfers and ADL tasks      Treatment Time In: 8:45am           Treatment Time Out: 9:10am              Treatment Charges: Mins Units   Ther Ex  29442     Manual Therapy 12552     Thera Activities 18255     ADL/Home Mgt 59680 25 2   Neuro Re-ed 47773     Group Therapy      Orthotic manage/training  73914     Non-Billable Time     Total Timed Treatment 25 2        Alicia HAWKINS/JUMA [id number]

## 2022-04-01 NOTE — PROGRESS NOTES
Patient bladder scanned for  474ml , then voided in bathroom but did not use urinal so unable to measure urine,post void scan for  266 ml

## 2022-04-01 NOTE — CARE COORDINATION
Notified  by Cosmo Randall from [facility] acute rehab that insurance denied acute rehab. Patient and family informed and would still like [facility] . [employer]  From [facility] notified of possible discharge this weekend. Orders on chart. [facility] was recommended and family has no preference of DME company and agree to use [facility]. Referral given to Jose Luis Ochoa from [facility] DME. CM/SW will continue to follow. CM/SW will continue to follow.  Spouse will transport when medically cleared

## 2022-04-01 NOTE — PROGRESS NOTES
Physical Therapy  Physical Therapy Treatment     Name: Som Morley  : 1945  MRN: [id number]      Date of Service: 2022    Evaluating PT:  [place] PT, DPT [id number]     Room #:  4969/4011-L  Diagnosis:  Lumbar stenosis with neurogenic claudication [M48.062]  Reason for admission: back pain  Precautions:  Falls, spinal neutral, LSO, hemovac   Procedure/Surgery:  3/30 L5-S1 PLIF  Equipment Recommendations:  FWW     SUBJECTIVE:  Pt lives with his wife in a 1 story home with 4 DESTINEY either front or back with 0 or 1 rail respectively. Pt ambulated with no AD PTA. OBJECTIVE:   Initial Evaluation  Date: 3/31 Treatment   Short Term/ Long Term   Goals   AM-PAC 6 Clicks     Was pt agreeable to Eval/treatment? Yes  Yes     Does pt have pain?  5/10 abdomen  Denies pain    Bed Mobility  Rolling: Graham  Supine to sit: Graham  Sit to supine: NT  Scooting: Graham Rolling: NT  Supine to sit: NT  Sit to supine: NT  Scooting: NT  -sitting in chair Independent    Transfers Sit to stand: Graham  Stand to sit: Graham  Stand pivot: NT Sit to stand: Supervision   Stand to sit: Supervision   Stand pivot: NT Independent    Ambulation    35 feet with [employer] Graham   200 ft with [employer] Supervision  >200 feet with AAD Mod I vs independent   Stair negotiation: ascended and descended  NT 4 steps 1 rail SBA  4 steps with 1 rail Mod I      LE ROM: WFL    LE Strength:   knee ext: 5/5  ankle DF: 5/5    Balance:   Sitting static: Independent  Sitting dynamic: Supervision  Standing static: Supervision   Standing dynamic: Supervision [employer]    -Pt is A & O x 3  -Sensation:  Pt denies numbness and tingling to extremities  -Edema:  unremarkable     Therapeutic Exercises:  Functional activity     Patient education  Pt educated on safety, sequencing of transfers, and role of PT    Patient response to education:   Pt verbalized understanding Pt demonstrated skill Pt requires further education in this area   Yes  Yes  Yes ASSESSMENT:  Conditions Requiring Skilled Therapeutic Intervention:  []Decreased strength     []Decreased ROM  [x]Decreased functional mobility  [x]Decreased balance   [x]Decreased endurance   []Decreased posture  []Decreased sensation  []Decreased coordination   []Decreased vision  [x]Decreased safety awareness   [x]Increased pain       Comments:  Pt received sitting in chair and agreeable to PT session   Pt reeducated on spinal precautions with good understanding. He is no longer complaining of pain and has progressed to no longer needing assist for mobility. His gait speed and balance are both fair with no swaying or buckling and needing no assist. He completed stair negotiation with use of railing. He is progressing well. Pt with all needs met and call light in reach. Pt would benefit from continued PT POC to address functional deficits described above. Treatment:  Patient practiced and was instructed in the following treatment:     Therapeutic activity  o Patient education provided continuously throughout session for sequencing, safety maintenance, and improving any deficits found during the evaluation.   o STS and pivot transfer training - pt educated on proper hand and foot placement, safety and sequencing, and use of proper technique to safely complete sit<>stand and pivot transfers with hands on assistance to complete task safely   o Gait training- pt was given verbal and tactile cues to facilitate improved speed and balance during ambulation as well as provided with physical assistance.   o Stair training     PHYSICAL THERAPY PLAN OF CARE:    PT POC is established based on physician order and patient diagnosis     Referring provider/PT Order: 03/30/22 1615   PT eval and treat Start: 03/30/22 1615, End: 03/30/22 1615, ONE TIME, Standing Count: 1 Occurrences, R    Robin Gonzales MD    Diagnosis:  Lumbar stenosis with neurogenic claudication [M48.062]  Specific instructions for next treatment: Progress gait and stairs as able. Current Treatment Recommendations:   [] Strengthening to improve independence with functional mobility   [] ROM to improve independence with functional mobility   [x] Balance Training to improve static/dynamic balance and to reduce fall risk  [x] Endurance Training to improve activity tolerance during functional mobility   [x] Transfer Training to improve safety and independence with all functional transfers   [x] Gait Training to improve gait mechanics, endurance and asses need for appropriate assistive device  [x] Stair Training in preparation for safe discharge home and/or into the community   [x] Positioning to prevent skin breakdown and contractures  [x] Safety and Education Training   [] Patient/Caregiver Education   [] HEP  [] Other     PT long term treatment goals are located in above grid    Frequency of treatments: 2-5x/week x 1-2 weeks. Time in  0940  Time out  0950    Total Treatment Time  10 minutes     Evaluation Time includes thorough review of current medical information, gathering information on past medical history/social history and prior level of function, completion of standardized testing/informal observation of tasks, assessment of data and education on plan of care and goals.     CPT codes:  [] Low Complexity PT evaluation 36060  [] Moderate Complexity PT evaluation 12670  [] High Complexity PT evaluation 92186  [] PT Re-evaluation 59344  [] Gait training 52755 - minutes  [] Manual therapy 11157 - minutes  [x] Therapeutic activities 61256 10 minutes  [] Therapeutic exercises 68503 - minutes  [] Neuromuscular reeducation 06522 - minutes     Adelina Orantes, PT, DPT  [id number]

## 2022-04-02 LAB
METER GLUCOSE: 103 MG/DL (ref 74–99)
METER GLUCOSE: 105 MG/DL (ref 74–99)
METER GLUCOSE: 159 MG/DL (ref 74–99)
METER GLUCOSE: 92 MG/DL (ref 74–99)

## 2022-04-02 PROCEDURE — 82962 GLUCOSE BLOOD TEST: CPT

## 2022-04-02 PROCEDURE — 99024 POSTOP FOLLOW-UP VISIT: CPT | Performed by: NEUROLOGICAL SURGERY

## 2022-04-02 PROCEDURE — 2500000003 HC RX 250 WO HCPCS: Performed by: NEUROLOGICAL SURGERY

## 2022-04-02 PROCEDURE — 6360000002 HC RX W HCPCS: Performed by: NEUROLOGICAL SURGERY

## 2022-04-02 PROCEDURE — 1200000000 HC SEMI PRIVATE

## 2022-04-02 PROCEDURE — 6370000000 HC RX 637 (ALT 250 FOR IP): Performed by: NEUROLOGICAL SURGERY

## 2022-04-02 PROCEDURE — 2700000000 HC OXYGEN THERAPY PER DAY

## 2022-04-02 PROCEDURE — 2580000003 HC RX 258: Performed by: NEUROLOGICAL SURGERY

## 2022-04-02 PROCEDURE — 6370000000 HC RX 637 (ALT 250 FOR IP): Performed by: STUDENT IN AN ORGANIZED HEALTH CARE EDUCATION/TRAINING PROGRAM

## 2022-04-02 PROCEDURE — 99222 1ST HOSP IP/OBS MODERATE 55: CPT | Performed by: SURGERY

## 2022-04-02 RX ADMIN — ACETAMINOPHEN 650 MG: 325 TABLET ORAL at 12:20

## 2022-04-02 RX ADMIN — BISACODYL 5 MG: 5 TABLET, COATED ORAL at 09:35

## 2022-04-02 RX ADMIN — PANTOPRAZOLE SODIUM 40 MG: 40 TABLET, DELAYED RELEASE ORAL at 05:50

## 2022-04-02 RX ADMIN — METOPROLOL TARTRATE 12.5 MG: 25 TABLET, FILM COATED ORAL at 09:35

## 2022-04-02 RX ADMIN — OXYCODONE HYDROCHLORIDE 10 MG: 10 TABLET ORAL at 12:20

## 2022-04-02 RX ADMIN — GABAPENTIN 300 MG: 300 CAPSULE ORAL at 09:35

## 2022-04-02 RX ADMIN — CYCLOBENZAPRINE 10 MG: 10 TABLET, FILM COATED ORAL at 09:35

## 2022-04-02 RX ADMIN — SENNOSIDES AND DOCUSATE SODIUM 1 TABLET: 8.6; 5 TABLET ORAL at 21:14

## 2022-04-02 RX ADMIN — ZOLPIDEM TARTRATE 10 MG: 5 TABLET, FILM COATED ORAL at 21:14

## 2022-04-02 RX ADMIN — SENNOSIDES AND DOCUSATE SODIUM 1 TABLET: 8.6; 5 TABLET ORAL at 09:35

## 2022-04-02 RX ADMIN — ATORVASTATIN CALCIUM 40 MG: 40 TABLET, FILM COATED ORAL at 09:35

## 2022-04-02 RX ADMIN — METOPROLOL TARTRATE 12.5 MG: 25 TABLET, FILM COATED ORAL at 21:14

## 2022-04-02 RX ADMIN — OXYCODONE 5 MG: 5 TABLET ORAL at 05:50

## 2022-04-02 RX ADMIN — OXYCODONE HYDROCHLORIDE 10 MG: 10 TABLET ORAL at 17:13

## 2022-04-02 RX ADMIN — GABAPENTIN 300 MG: 300 CAPSULE ORAL at 21:14

## 2022-04-02 RX ADMIN — POLYETHYLENE GLYCOL 3350 17 G: 17 POWDER, FOR SOLUTION ORAL at 09:35

## 2022-04-02 RX ADMIN — LOSARTAN POTASSIUM 25 MG: 25 TABLET, FILM COATED ORAL at 09:35

## 2022-04-02 RX ADMIN — SODIUM PHOSPHATE 1 ENEMA: 7; 19 ENEMA RECTAL at 17:13

## 2022-04-02 RX ADMIN — ACETAMINOPHEN 650 MG: 325 TABLET ORAL at 17:14

## 2022-04-02 RX ADMIN — Medication 10 ML: at 09:36

## 2022-04-02 RX ADMIN — Medication 2000 MG: at 05:51

## 2022-04-02 RX ADMIN — Medication 10 ML: at 21:15

## 2022-04-02 RX ADMIN — TAMSULOSIN HYDROCHLORIDE 0.4 MG: 0.4 CAPSULE ORAL at 09:35

## 2022-04-02 RX ADMIN — POLYETHYLENE GLYCOL 3350 17 G: 17 POWDER, FOR SOLUTION ORAL at 21:15

## 2022-04-02 RX ADMIN — BISACODYL 10 MG: 10 SUPPOSITORY RECTAL at 09:39

## 2022-04-02 ASSESSMENT — PAIN SCALES - GENERAL
PAINLEVEL_OUTOF10: 7
PAINLEVEL_OUTOF10: 8
PAINLEVEL_OUTOF10: 6
PAINLEVEL_OUTOF10: 8
PAINLEVEL_OUTOF10: 5

## 2022-04-02 ASSESSMENT — PAIN DESCRIPTION - PAIN TYPE: TYPE: SURGICAL PAIN

## 2022-04-02 ASSESSMENT — PAIN DESCRIPTION - DESCRIPTORS: DESCRIPTORS: ACHING;DISCOMFORT

## 2022-04-02 ASSESSMENT — PAIN DESCRIPTION - LOCATION: LOCATION: BACK

## 2022-04-02 NOTE — PLAN OF CARE
Problem: Infection - Surgical Site:  Goal: Will show no infection signs and symptoms  Description: Will show no infection signs and symptoms  Outcome: Met This Shift     Problem: Falls - Risk of:  Goal: Will remain free from falls  Description: Will remain free from falls  Outcome: Met This Shift     Problem: Falls - Risk of:  Goal: Absence of physical injury  Description: Absence of physical injury  Outcome: Met This Shift     Problem: Skin Integrity:  Goal: Will show no infection signs and symptoms  Description: Will show no infection signs and symptoms  Outcome: Met This Shift     Problem: Musculor/Skeletal Functional Status  Goal: Absence of falls  Outcome: Met This Shift

## 2022-04-02 NOTE — CONSULTS
GENERAL SURGERY  CONSULT NOTE  4/1/2022    Physician Consulted: Dr. Debbie Sylvester  Reason for Consult: ileus  Referring Physician: Dr. Missy Velasquez    MAURISIO Guerra Setting is a 64 y.o. male who presents to the general surgery service for evaluation of ileus. The patient is currently admitted following L5-S1 PLIF on 3/30. Following his procedure, the patient has developed abdominal distention and pain. He currently has a bowel regimen ordered, which consists of p.o. MiraLAX, Dulcolax, and Senokot, along with Dulcolax suppositories. According to the [facility], the patient has not yet received any suppositories. Today, the patient was also given one bottle of magnesium citrate and a Fleet enema. The patient states that after he received the enema, he did pass a large bowel movement, and now feels somewhat better. Abdominal pain has resolved; however, distention remains. The patient reports having tolerated small amounts of a regular diet postoperatively. However, he was made n.p.o. today for worsening distention and KUB which showed mildly dilated colon without air in the rectum. The patient states that he has had prior abdominal surgery, but is unsure what exactly was performed. Medical history is significant for diabetes mellitus.     Past Medical History:   Diagnosis Date    Anxiety     Asymptomatic bilateral carotid artery stenosis     Bowel obstruction (HCC)     CAD (coronary artery disease)     COPD (chronic obstructive pulmonary disease) (HCC)     Cryptogenic stroke (HCC)     Diabetes mellitus ([facility] [address]     Hyperlipidemia     Hypertension     NSTEMI (non-ST elevated myocardial infarction) [facility])        Past Surgical History:   Procedure Laterality Date    ANKLE FRACTURE SURGERY Left 2020    BACK SURGERY      CARDIAC CATHETERIZATION  4/26/2016    Dr Corinne Peon GRAFT  4/27/16    x3    DIAGNOSTIC CARDIAC CATH LAB PROCEDURE  04/26/2016    INSERTABLE CARDIAC MONITOR Left 10/11/2016 LINQ loop recorder, Dr. Carey Diggs  2021    removal    LUMBAR SPINE SURGERY N/A 3/30/2022    L5-S1 POSTERIOR LUMBAR INTERBODY FUSION - O ARM, C ARM, JAYDEN TABLE, CELL SAVER, PLATELET GELL, AUDIOLOGY, CAGES, PLATES, GLOBUS performed by Bailee Willett MD at [facility] OR       Medications Prior to Admission:    Prior to Admission medications    Medication Sig Start Date End Date Taking? Authorizing Provider   oxyCODONE (ROXICODONE) 5 MG immediate release tablet Take 1 tablet by mouth every 4 hours as needed for Pain for up to 7 days. 22 Yes Neida Chandra PA-C   cyclobenzaprine (FLEXERIL) 10 MG tablet Take 1 tablet by mouth 3 times daily as needed for Muscle spasms 22 Yes Nigel Cleary PA-C   zolpidem (AMBIEN) 10 MG tablet Take 1 tablet by mouth every evening for 30 days. 12/29/21 3/14/22  Joan Cade DO   losartan (COZAAR) 25 MG tablet Take 1 tablet by mouth daily 21   Sasha Izquierdo DO   metoprolol tartrate (LOPRESSOR) 25 MG tablet Take 0.5 tablets by mouth 2 times daily 21   Sasha Izquierdo DO   gabapentin (NEURONTIN) 300 MG capsule Take 300 mg by mouth 2 times daily.      Historical Provider, MD   atorvastatin (LIPITOR) 40 MG tablet Take 1 tablet by mouth daily 20   Joan Cade DO   empagliflozin (JARDIANCE) 25 MG tablet Take 25 mg by mouth daily 20   Joan Cade DO   pantoprazole (PROTONIX) 40 MG tablet TK 1 T PO QD 16   Historical Provider, MD       No Known Allergies    Family History   Problem Relation Age of Onset    Heart Surgery Mother     Hypertension Mother     Heart Attack Father     Heart Surgery Brother        Social History     Tobacco Use    Smoking status: Former Smoker     Packs/day: 1.00     Years: 56.00     Pack years: 56.00     Types: Cigarettes     Start date: 54     Quit date: 2016     Years since quittin.9    Smokeless tobacco: Never Used   Vaping Use    Vaping Use: Never used    Passive vaping exposure: Yes   Substance Use Topics    Alcohol use: No    Drug use: No         Review of Systems   Constitutional: Positive for appetite change. Negative for chills, fatigue and fever. Respiratory: Negative for shortness of breath. Cardiovascular: Negative for chest pain and palpitations. Gastrointestinal: Positive for abdominal distention and abdominal pain. Negative for constipation, diarrhea, nausea and vomiting. Genitourinary: Positive for difficulty urinating. Negative for dysuria. Skin: Negative for color change and rash. Neurological: Negative for dizziness and weakness. Psychiatric/Behavioral: Negative for agitation, behavioral problems and confusion. PHYSICAL EXAM:    Vitals:    04/01/22 2030   BP: (!) 140/59   Pulse: 87   Resp: 17   Temp: 99.3 °F (37.4 °C)   SpO2: 91%       General Appearance:  awake, alert, oriented, in no acute distress  Skin:  Skin color, texture, turgor normal. No rashes or lesions. Head:  NCAT. No scleral icterus or conjunctival pallor  Lungs/Chest:  Normal expansion. No chest wall tenderness  Cardiovascular:  Warm throughout. No chest pain  Abdomen:  Soft, non tender, moderately distended, mildly tympanic. No guarding. No palpable masses. Extremities: Extremities warm to touch with no edema. LABS:    CBC  Recent Labs     03/31/22  0651   WBC 17.9*   HGB 13.1   HCT 40.1        BMP  Recent Labs     03/31/22  0651      K 4.6      CO2 23   BUN 18   CREATININE 1.0   CALCIUM 8.7     Liver Function  No results for input(s): AMYLASE, LIPASE, BILITOT, BILIDIR, AST, ALT, ALKPHOS, PROT, LABALBU in the last 72 hours. No results for input(s): LACTATE in the last 72 hours. No results for input(s): INR, PTT in the last 72 hours.     Invalid input(s): PT    RADIOLOGY    XR CHEST (2 VW)    Result Date: 3/24/2022  EXAMINATION: TWO XRAY VIEWS OF THE CHEST 3/24/2022 11:24 am COMPARISON: 5 November 2021 HISTORY: [address] PROVIDED HISTORY: Pre-op testing TECHNOLOGIST PROVIDED HISTORY: What reading provider will be dictating this exam?->CRC FINDINGS: Postsurgical changes redemonstrated. Stable minimal scarring at the right base. Normal heart and pulmonary vascularity. Neither costophrenic angle is blunted. Minimal scarring at the right base. I have personally reviewed all relevant labs and imaging. ASSESSMENT:  64 y.o. male with ileus s/p L5-S1 PLIF on 3/30.  He has moved his bowels once following administration of an enema earlier today    PLAN:  - repeat enema in the morning on 4/2  - continue bowel regimen with suppositories, medications adjusted  - monitor abdominal exam  - ambulate as able, activity restrictions per neurosurgery    Electronically signed by Doe Daniel DO on 4/1/22 at 10:54 PM EDT

## 2022-04-02 NOTE — PROGRESS NOTES
Department of Neurosurgery  Progress Note    CHIEF COMPLAINT:  POD #3 s/p lumbar fusion    SUBJECTIVE:  Nemo op site pain okay. BM yesterday. Remains NPO    REVIEW OF SYSTEMS :  Constitutional: Negative for chills and fever. Neurological: Negative for dizziness, tremors and speech change. OBJECTIVE:   VITALS:  BP (!) 125/45   Pulse 79   Temp 98.8 °F (37.1 °C) (Oral)   Resp 12   Ht 5' 5\" (1.651 m)   Wt 139 lb (63 kg)   SpO2 95%   BMI 23.13 kg/m²   PHYSICAL:    AAO x4, rationally conversant. Heddy  Speech clear  Face symmetric GI/FT  Tongue MDL  SS symm and strong  MARTINEZ-C, preserved power  Incision: c/d/i  Drain 75 cc  D/c'd drain at bedside and without incident.       DATA:  CBC:   Lab Results   Component Value Date    WBC 17.9 03/31/2022    RBC 4.21 03/31/2022    HGB 13.1 03/31/2022    HCT 40.1 03/31/2022    MCV 95.2 03/31/2022    MCH 31.1 03/31/2022    MCHC 32.7 03/31/2022    RDW 14.8 03/31/2022     03/31/2022    MPV 12.1 03/31/2022     BMP:    Lab Results   Component Value Date     03/31/2022    K 4.6 03/31/2022    K 3.9 03/24/2022     03/31/2022    CO2 23 03/31/2022    BUN 18 03/31/2022    LABALBU 3.8 11/05/2021    LABALBU 3.9 03/17/2011    CREATININE 1.0 03/31/2022    CALCIUM 8.7 03/31/2022    GFRAA >60 03/31/2022    LABGLOM >60 03/31/2022    GLUCOSE 135 03/31/2022    GLUCOSE 109 03/21/2011     PT/INR:    Lab Results   Component Value Date    PROTIME 11.1 03/24/2022    PROTIME 11.8 03/17/2011    INR 1.0 03/24/2022     PTT:    Lab Results   Component Value Date    APTT 30.2 07/17/2020   [APTT}    Current Inpatient Medications  Current Facility-Administered Medications: tamsulosin (FLOMAX) capsule 0.4 mg, 0.4 mg, Oral, Daily  bisacodyl (DULCOLAX) suppository 10 mg, 10 mg, Rectal, Daily  polyethylene glycol (GLYCOLAX) packet 17 g, 17 g, Oral, BID  fleet rectal enema 1 enema, 1 enema, Rectal, Daily  atorvastatin (LIPITOR) tablet 40 mg, 40 mg, Oral, Daily  empagliflozin (JARDIANCE) tablet TABS 25 mg, 25 mg, Oral, Daily  gabapentin (NEURONTIN) capsule 300 mg, 300 mg, Oral, BID  losartan (COZAAR) tablet 25 mg, 25 mg, Oral, Daily  metoprolol tartrate (LOPRESSOR) tablet 12.5 mg, 12.5 mg, Oral, BID  pantoprazole (PROTONIX) tablet 40 mg, 40 mg, Oral, QAM AC  zolpidem (AMBIEN) tablet 10 mg, 10 mg, Oral, QPM  sodium chloride flush 0.9 % injection 5-40 mL, 5-40 mL, IntraVENous, 2 times per day  sodium chloride flush 0.9 % injection 5-40 mL, 5-40 mL, IntraVENous, PRN  0.9 % sodium chloride infusion, 25 mL, IntraVENous, PRN  acetaminophen (TYLENOL) tablet 650 mg, 650 mg, Oral, Q6H  ondansetron (ZOFRAN-ODT) disintegrating tablet 4 mg, 4 mg, Oral, Q8H PRN **OR** ondansetron (ZOFRAN) injection 4 mg, 4 mg, IntraVENous, Q6H PRN  0.9 % sodium chloride infusion, , IntraVENous, Continuous  oxyCODONE (ROXICODONE) immediate release tablet 5 mg, 5 mg, Oral, Q4H PRN **OR** oxyCODONE HCl (OXY-IR) immediate release tablet 10 mg, 10 mg, Oral, Q4H PRN  cyclobenzaprine (FLEXERIL) tablet 10 mg, 10 mg, Oral, TID PRN  diphenhydrAMINE (BENADRYL) tablet 25 mg, 25 mg, Oral, Q6H PRN **OR** diphenhydrAMINE (BENADRYL) injection 25 mg, 25 mg, IntraVENous, Q6H PRN  bisacodyl (DULCOLAX) EC tablet 5 mg, 5 mg, Oral, Daily  sennosides-docusate sodium (SENOKOT-S) 8.6-50 MG tablet 1 tablet, 1 tablet, Oral, BID  benzocaine-menthol (CEPACOL SORE THROAT) lozenge 1 lozenge, 1 lozenge, Oral, Q2H PRN  insulin lispro (HUMALOG) injection vial 0-12 Units, 0-12 Units, SubCUTAneous, TID WC  insulin lispro (HUMALOG) injection vial 0-6 Units, 0-6 Units, SubCUTAneous, Nightly  glucose (GLUTOSE) 40 % oral gel 15 g, 15 g, Oral, PRN  dextrose 50 % IV solution, 12.5 g, IntraVENous, PRN  glucagon (rDNA) injection 1 mg, 1 mg, IntraMUSCular, PRN  dextrose 5 % solution, 100 mL/hr, IntraVENous, PRN  promethazine (PHENERGAN) in sodium chloride 0.9% 50 mL IVPB 25 mg, 25 mg, IntraVENous, Q8H PRN    ASSESSMENT:   · S/p L5-S1 PLIF on 3/30 - stable  · Post op ileus    PLAN:  · Mobilise with PT  · Appreciate gen surg input;  · WBAT with brace  · Enema  · Pain management  · Rehab placement planning  · Follow up in neurosurgery clinic in 2 weeks for staple removal and in 4 weeks with x-rays      Electronically signed by Hakeem Andino MD on 4/2/2022 at 10:58 AM

## 2022-04-02 NOTE — PROGRESS NOTES
[facility]   ATTENDING PHYSICIAN PROGRESS NOTE     I have examined the patient, reviewed the record, and discussed the case with the APN/ Resident. I have reviewed all relevant labs and imaging data. I agree with the assessment and plan with the following corrections/ additions. The following summarizes my clinical findings and independent assessment. CC: ileus    See resident consult note for full details    Patient states he is passing flatus but did not have bowel movement today. Reports some nausea.     Awake and alert  Follows commands  Heart: Regular rate/rhythm; no murmur  Lungs: Fairly clear bilaterally  Abdomen: Softly distended; bowel sounds active; nontender to palpation; no rebound; no guarding  Skin: Warm/dry  Back with some drainage from drain site that was removed today    Films personally reviewed/interpreted--some stool in right colon; air in left colon    Patient Active Problem List    Diagnosis Date Noted    Lumbar stenosis with neurogenic claudication 03/30/2022    Preoperative clearance 03/14/2022    Lumbar disc herniation with radiculopathy 09/08/2021    Type 2 diabetes mellitus without complication, without long-term current use of insulin ([facility] [address] 05/24/2021    Primary insomnia 05/27/2020    Carotid atherosclerosis, bilateral 05/27/2020    Dupuytren's disease of palm of left hand 01/08/2020    Benign prostatic hyperplasia without lower urinary tract symptoms 01/28/2019    Lumbar disc disease 03/13/2017    Status post placement of implantable loop recorder 10/25/2016    PFO (patent foramen ovale) 10/11/2016    Hx of CABG 10/10/2016    Essential hypertension 10/10/2016    Mixed hyperlipidemia 10/10/2016    Carotid artery stenosis, asymptomatic, bilateral 04/29/2016    Coronary artery disease involving native coronary artery of native heart without angina pectoris 04/26/2016       Postop ileus--continue bowel regimen  OOB/ambulate  Monitor abdominal exam  Monitor bowel function  Diet as tolerated  Rest of management per primary service    Irlanda Calix MD, FACS  4/2/2022  11:08 AM      NOTE: This report was transcribed using voice recognition software. Every effort was made to ensure accuracy; however, inadvertent computerized transcription errors may be present.

## 2022-04-02 NOTE — PROGRESS NOTES
CLINICAL PHARMACY NOTE: MEDS TO BEDS    Total # of Prescriptions Filled: 2   The following medications were delivered to the patient:  · Cyclobenzaprine 10 mg  · Oxycodone 5 mg    Additional Documentation:

## 2022-04-03 LAB
METER GLUCOSE: 100 MG/DL (ref 74–99)
METER GLUCOSE: 113 MG/DL (ref 74–99)
METER GLUCOSE: 120 MG/DL (ref 74–99)
METER GLUCOSE: 123 MG/DL (ref 74–99)

## 2022-04-03 PROCEDURE — 97530 THERAPEUTIC ACTIVITIES: CPT

## 2022-04-03 PROCEDURE — 2700000000 HC OXYGEN THERAPY PER DAY

## 2022-04-03 PROCEDURE — 2580000003 HC RX 258: Performed by: NEUROLOGICAL SURGERY

## 2022-04-03 PROCEDURE — 6370000000 HC RX 637 (ALT 250 FOR IP): Performed by: NEUROLOGICAL SURGERY

## 2022-04-03 PROCEDURE — 99231 SBSQ HOSP IP/OBS SF/LOW 25: CPT | Performed by: SURGERY

## 2022-04-03 PROCEDURE — 82962 GLUCOSE BLOOD TEST: CPT

## 2022-04-03 PROCEDURE — 99024 POSTOP FOLLOW-UP VISIT: CPT | Performed by: NEUROLOGICAL SURGERY

## 2022-04-03 PROCEDURE — 6370000000 HC RX 637 (ALT 250 FOR IP): Performed by: STUDENT IN AN ORGANIZED HEALTH CARE EDUCATION/TRAINING PROGRAM

## 2022-04-03 PROCEDURE — 1200000000 HC SEMI PRIVATE

## 2022-04-03 RX ADMIN — OXYCODONE HYDROCHLORIDE 10 MG: 10 TABLET ORAL at 09:26

## 2022-04-03 RX ADMIN — CYCLOBENZAPRINE 10 MG: 10 TABLET, FILM COATED ORAL at 09:25

## 2022-04-03 RX ADMIN — ACETAMINOPHEN 650 MG: 325 TABLET ORAL at 17:58

## 2022-04-03 RX ADMIN — SENNOSIDES AND DOCUSATE SODIUM 1 TABLET: 8.6; 5 TABLET ORAL at 09:25

## 2022-04-03 RX ADMIN — METOPROLOL TARTRATE 12.5 MG: 25 TABLET, FILM COATED ORAL at 20:42

## 2022-04-03 RX ADMIN — OXYCODONE HYDROCHLORIDE 10 MG: 10 TABLET ORAL at 20:42

## 2022-04-03 RX ADMIN — POLYETHYLENE GLYCOL 3350 17 G: 17 POWDER, FOR SOLUTION ORAL at 09:25

## 2022-04-03 RX ADMIN — ACETAMINOPHEN 650 MG: 325 TABLET ORAL at 22:44

## 2022-04-03 RX ADMIN — GABAPENTIN 300 MG: 300 CAPSULE ORAL at 09:25

## 2022-04-03 RX ADMIN — BISACODYL 5 MG: 5 TABLET, COATED ORAL at 09:25

## 2022-04-03 RX ADMIN — POLYETHYLENE GLYCOL 3350 17 G: 17 POWDER, FOR SOLUTION ORAL at 20:44

## 2022-04-03 RX ADMIN — PANTOPRAZOLE SODIUM 40 MG: 40 TABLET, DELAYED RELEASE ORAL at 05:00

## 2022-04-03 RX ADMIN — ZOLPIDEM TARTRATE 10 MG: 5 TABLET, FILM COATED ORAL at 20:42

## 2022-04-03 RX ADMIN — ATORVASTATIN CALCIUM 40 MG: 40 TABLET, FILM COATED ORAL at 09:25

## 2022-04-03 RX ADMIN — Medication 10 ML: at 09:26

## 2022-04-03 RX ADMIN — ACETAMINOPHEN 650 MG: 325 TABLET ORAL at 04:43

## 2022-04-03 RX ADMIN — SENNOSIDES AND DOCUSATE SODIUM 1 TABLET: 8.6; 5 TABLET ORAL at 20:42

## 2022-04-03 RX ADMIN — LOSARTAN POTASSIUM 25 MG: 25 TABLET, FILM COATED ORAL at 09:25

## 2022-04-03 RX ADMIN — OXYCODONE HYDROCHLORIDE 10 MG: 10 TABLET ORAL at 17:58

## 2022-04-03 RX ADMIN — Medication 10 ML: at 20:43

## 2022-04-03 RX ADMIN — GABAPENTIN 300 MG: 300 CAPSULE ORAL at 20:42

## 2022-04-03 RX ADMIN — METOPROLOL TARTRATE 12.5 MG: 25 TABLET, FILM COATED ORAL at 09:25

## 2022-04-03 RX ADMIN — TAMSULOSIN HYDROCHLORIDE 0.4 MG: 0.4 CAPSULE ORAL at 09:25

## 2022-04-03 RX ADMIN — CYCLOBENZAPRINE 10 MG: 10 TABLET, FILM COATED ORAL at 17:57

## 2022-04-03 ASSESSMENT — PAIN SCALES - GENERAL
PAINLEVEL_OUTOF10: 9
PAINLEVEL_OUTOF10: 7
PAINLEVEL_OUTOF10: 9
PAINLEVEL_OUTOF10: 0
PAINLEVEL_OUTOF10: 9
PAINLEVEL_OUTOF10: 7
PAINLEVEL_OUTOF10: 0

## 2022-04-03 NOTE — PROGRESS NOTES
Department of Neurosurgery  Progress Note    CHIEF COMPLAINT:  POD #5 s/p lumbar fusion    SUBJECTIVE:  Nemo op site pain okay. BM again this am.  Nemo po clears    REVIEW OF SYSTEMS :  Constitutional: Negative for chills and fever. Neurological: Negative for dizziness, tremors and speech change. OBJECTIVE:   VITALS:  /80   Pulse 90   Temp 97.8 °F (36.6 °C) (Temporal)   Resp 16   Ht 5' 5\" (1.651 m)   Wt 139 lb (63 kg)   SpO2 (!) 89%   BMI 23.13 kg/m²   PHYSICAL:    AAO x4, rationally conversant. [place] Mann   Speech clear  Face symmetric GI/FT  Tongue MDL  SS symm and strong  MARTINEZ-C, preserved power  Incision: c/d/i        DATA:  CBC:   Lab Results   Component Value Date    WBC 17.9 03/31/2022    RBC 4.21 03/31/2022    HGB 13.1 03/31/2022    HCT 40.1 03/31/2022    MCV 95.2 03/31/2022    MCH 31.1 03/31/2022    MCHC 32.7 03/31/2022    RDW 14.8 03/31/2022     03/31/2022    MPV 12.1 03/31/2022     BMP:    Lab Results   Component Value Date     03/31/2022    K 4.6 03/31/2022    K 3.9 03/24/2022     03/31/2022    CO2 23 03/31/2022    BUN 18 03/31/2022    LABALBU 3.8 11/05/2021    LABALBU 3.9 03/17/2011    CREATININE 1.0 03/31/2022    CALCIUM 8.7 03/31/2022    GFRAA >60 03/31/2022    LABGLOM >60 03/31/2022    GLUCOSE 135 03/31/2022    GLUCOSE 109 03/21/2011     PT/INR:    Lab Results   Component Value Date    PROTIME 11.1 03/24/2022    PROTIME 11.8 03/17/2011    INR 1.0 03/24/2022     PTT:    Lab Results   Component Value Date    APTT 30.2 07/17/2020   [APTT}    Current Inpatient Medications  Current Facility-Administered Medications: tamsulosin (FLOMAX) capsule 0.4 mg, 0.4 mg, Oral, Daily  bisacodyl (DULCOLAX) suppository 10 mg, 10 mg, Rectal, Daily  polyethylene glycol (GLYCOLAX) packet 17 g, 17 g, Oral, BID  fleet rectal enema 1 enema, 1 enema, Rectal, Daily  atorvastatin (LIPITOR) tablet 40 mg, 40 mg, Oral, Daily  empagliflozin (JARDIANCE) tablet TABS 25 mg, 25 mg, Oral, Daily  gabapentin (NEURONTIN) capsule 300 mg, 300 mg, Oral, BID  losartan (COZAAR) tablet 25 mg, 25 mg, Oral, Daily  metoprolol tartrate (LOPRESSOR) tablet 12.5 mg, 12.5 mg, Oral, BID  pantoprazole (PROTONIX) tablet 40 mg, 40 mg, Oral, QAM AC  zolpidem (AMBIEN) tablet 10 mg, 10 mg, Oral, QPM  sodium chloride flush 0.9 % injection 5-40 mL, 5-40 mL, IntraVENous, 2 times per day  sodium chloride flush 0.9 % injection 5-40 mL, 5-40 mL, IntraVENous, PRN  0.9 % sodium chloride infusion, 25 mL, IntraVENous, PRN  acetaminophen (TYLENOL) tablet 650 mg, 650 mg, Oral, Q6H  ondansetron (ZOFRAN-ODT) disintegrating tablet 4 mg, 4 mg, Oral, Q8H PRN **OR** ondansetron (ZOFRAN) injection 4 mg, 4 mg, IntraVENous, Q6H PRN  0.9 % sodium chloride infusion, , IntraVENous, Continuous  oxyCODONE (ROXICODONE) immediate release tablet 5 mg, 5 mg, Oral, Q4H PRN **OR** oxyCODONE HCl (OXY-IR) immediate release tablet 10 mg, 10 mg, Oral, Q4H PRN  cyclobenzaprine (FLEXERIL) tablet 10 mg, 10 mg, Oral, TID PRN  diphenhydrAMINE (BENADRYL) tablet 25 mg, 25 mg, Oral, Q6H PRN **OR** diphenhydrAMINE (BENADRYL) injection 25 mg, 25 mg, IntraVENous, Q6H PRN  bisacodyl (DULCOLAX) EC tablet 5 mg, 5 mg, Oral, Daily  sennosides-docusate sodium (SENOKOT-S) 8.6-50 MG tablet 1 tablet, 1 tablet, Oral, BID  benzocaine-menthol (CEPACOL SORE THROAT) lozenge 1 lozenge, 1 lozenge, Oral, Q2H PRN  insulin lispro (HUMALOG) injection vial 0-12 Units, 0-12 Units, SubCUTAneous, TID WC  insulin lispro (HUMALOG) injection vial 0-6 Units, 0-6 Units, SubCUTAneous, Nightly  glucose (GLUTOSE) 40 % oral gel 15 g, 15 g, Oral, PRN  dextrose 50 % IV solution, 12.5 g, IntraVENous, PRN  glucagon (rDNA) injection 1 mg, 1 mg, IntraMUSCular, PRN  dextrose 5 % solution, 100 mL/hr, IntraVENous, PRN  promethazine (PHENERGAN) in sodium chloride 0.9% 50 mL IVPB 25 mg, 25 mg, IntraVENous, Q8H PRN    ASSESSMENT:   · S/p L5-S1 PLIF on 3/30 - stable  · Post op ileus    PLAN:  · Mobilise with PT  · Appreciate gen surg input;  · WBAT with brace  · Pain management  · Rehab placement planning  · Follow up in neurosurgery clinic in 2 weeks for staple removal and in 4 weeks with x-rays      Electronically signed by Skye Holland MD on 4/3/2022 at 1:55 PM

## 2022-04-03 NOTE — PROGRESS NOTES
Physical Therapy  Physical Therapy Treatment     Name: Debra Casey  : 1945  MRN: [id number]      Date of Service: 4/3/2022    Evaluating PT:  Krista Muñiz PT, [id number]     Room #:  4736/3510-R  Diagnosis:  Lumbar stenosis with neurogenic claudication [M48.062]  Reason for admission: back pain  Precautions:  Falls, spinal neutral, LSO, hemovac   Procedure/Surgery:  3/30 L5-S1 PLIF  Equipment Recommendations:  FWW     SUBJECTIVE:  Pt lives with his wife in a 1 story home with 4 DESTINEY either front or back with 0 or 1 rail respectively. Pt ambulated with no AD PTA. OBJECTIVE:   Initial Evaluation  Date: 3/31 Treatment  4/3 Short Term/ Long Term   Goals   AM-PAC 6 Clicks  74/88    Was pt agreeable to Eval/treatment? Yes  Yes     Does pt have pain?  5/10 abdomen  Denies pain    Bed Mobility  Rolling: Graham  Supine to sit: Graham  Sit to supine: NT  Scooting: Graham Rolling: Supervision  Supine to sit: Supervision   Sit to supine: NT  Scooting: Supervision  VCs for spinal precautions Independent    Transfers Sit to stand: Graham  Stand to sit: Graham  Stand pivot: NT Sit to stand: Supervision   Stand to sit: Supervision   Stand pivot: SBA using [employer] Independent    Ambulation    35 feet with [employer] Graham   200 ft with [employer] Supervision  >200 feet with AAD Mod I vs independent   Stair negotiation: ascended and descended  NT NT  4 steps with 1 rail Mod I      Balance:   Sitting static: Independent  Sitting dynamic: Supervision  Standing static: Supervision   Standing dynamic: Supervision [employer]    -Pt is A & O x 3  -Sensation:  Pt denies numbness and tingling to extremities  -Edema:  unremarkable     Therapeutic Exercises:  Functional activity     Patient education  Pt educated on safety, sequencing of transfers, and role of PT    Patient response to education:   Pt verbalized understanding Pt demonstrated skill Pt requires further education in this area   Yes  Yes  Yes      ASSESSMENT:  Conditions Requiring Skilled Therapeutic Intervention:  []Decreased strength     []Decreased ROM  [x]Decreased functional mobility  [x]Decreased balance   [x]Decreased endurance   []Decreased posture  []Decreased sensation  []Decreased coordination   []Decreased vision  [x]Decreased safety awareness   [x]Increased pain     Comments:  Pt received supine in bed on RA and agreeable to PT session. Pt required cues for bed mobility and use of spinal precautions. Brace donned with assist from PT.  VCs for B knee extension in standing and use of WW due to pt being slightly impulsive and attempting amb without AD initially. VCs for upright posture and WW approximation. He amb with fair gait speed. Stairs not assessed this session due to breakfast tray arriving and pt requesting to return to eat. Pt did have difficulty opening items on tray and assist was provided. Pt was left sitting up in recliner with all needs met and call light in reach. Pt would benefit from continued PT POC to address functional deficits described above. RN notified that pt was up in chair and on RA. Treatment:  Patient practiced and was instructed in the following treatment:     Therapeutic activity  o Patient education provided continuously throughout session for sequencing, safety maintenance, and improving any deficits found during the evaluation. o STS and pivot transfer training - pt educated on proper hand and foot placement, safety and sequencing, and use of proper technique to safely complete sit<>stand and pivot transfers with hands on assistance to complete task safely   o Gait training- pt was given verbal and tactile cues to facilitate improved speed and balance during ambulation as well as provided with physical assistance.      PHYSICAL THERAPY PLAN OF CARE:    PT POC is established based on physician order and patient diagnosis     Referring provider/PT Order: 03/30/22 1615   PT eval and treat Start: 03/30/22 1615, End: 03/30/22 1615, ONE TIME, Standing Count: 1 Jolene, R    Ankita Snu MD    Diagnosis:  Lumbar stenosis with neurogenic claudication [M48.062]  Specific instructions for next treatment:  Progress gait and stairs as able.     Current Treatment Recommendations:   [] Strengthening to improve independence with functional mobility   [] ROM to improve independence with functional mobility   [x] Balance Training to improve static/dynamic balance and to reduce fall risk  [x] Endurance Training to improve activity tolerance during functional mobility   [x] Transfer Training to improve safety and independence with all functional transfers   [x] Gait Training to improve gait mechanics, endurance and asses need for appropriate assistive device  [x] Stair Training in preparation for safe discharge home and/or into the community   [x] Positioning to prevent skin breakdown and contractures  [x] Safety and Education Training   [] Patient/Caregiver Education   [] HEP  [] Other     Time in  0800  Time out  0810    Total Treatment Time  10 minutes     CPT codes:  [] Low Complexity PT evaluation 39793  [] Moderate Complexity PT evaluation 06650  [] High Complexity PT evaluation 88067  [] PT Re-evaluation 84637  [] Gait training 15982 - minutes  [] Manual therapy 62824 - minutes  [x] Therapeutic activities 30299 10 minutes  [] Therapeutic exercises 04537 - minutes  [] Neuromuscular reeducation 97513 - minutes     Gagan Buckley, PT, DPT  License [id number]

## 2022-04-03 NOTE — PROGRESS NOTES
[facility]   ATTENDING PHYSICIAN PROGRESS NOTE     I have examined the patient, reviewed the record, and discussed the case with the APN/ Resident. I have reviewed all relevant labs and imaging data. I agree with the assessment and plan with the following corrections/ additions. The following summarizes my clinical findings and independent assessment. CC: ileus    Patient reports he is passing flatus and had some BMs.     Awake and alert  Follows commands  Heart: Regular rate/rhythm; no murmur  Lungs: Fairly clear bilaterally  Abdomen: Softly distended; bowel sounds active; nontender to palpation; no rebound; no guarding  Skin: Warm/dry  Back with some drainage from drain site that was removed today      Patient Active Problem List    Diagnosis Date Noted    Lumbar stenosis with neurogenic claudication 03/30/2022    Preoperative clearance 03/14/2022    Lumbar disc herniation with radiculopathy 09/08/2021    Type 2 diabetes mellitus without complication, without long-term current use of insulin ([facility] [address] 05/24/2021    Primary insomnia 05/27/2020    Carotid atherosclerosis, bilateral 05/27/2020    Dupuytren's disease of palm of left hand 01/08/2020    Benign prostatic hyperplasia without lower urinary tract symptoms 01/28/2019    Lumbar disc disease 03/13/2017    Status post placement of implantable loop recorder 10/25/2016    PFO (patent foramen ovale) 10/11/2016    Hx of CABG 10/10/2016    Essential hypertension 10/10/2016    Mixed hyperlipidemia 10/10/2016    Carotid artery stenosis, asymptomatic, bilateral 04/29/2016    Coronary artery disease involving native coronary artery of native heart without angina pectoris 04/26/2016       Postop ileus--continue bowel regimen  OOB/ambulate  Monitor abdominal exam  Monitor bowel function  Diet as tolerated  Rest of management per primary service    Jamaal Mcclendon MD, FACS  4/3/2022  11:44 AM      NOTE: This report was transcribed using voice recognition software. Every effort was made to ensure accuracy; however, inadvertent computerized transcription errors may be present.

## 2022-04-04 LAB
METER GLUCOSE: 107 MG/DL (ref 74–99)
METER GLUCOSE: 110 MG/DL (ref 74–99)
METER GLUCOSE: 122 MG/DL (ref 74–99)
METER GLUCOSE: 124 MG/DL (ref 74–99)

## 2022-04-04 PROCEDURE — 6360000002 HC RX W HCPCS: Performed by: NEUROLOGICAL SURGERY

## 2022-04-04 PROCEDURE — 97530 THERAPEUTIC ACTIVITIES: CPT

## 2022-04-04 PROCEDURE — 82962 GLUCOSE BLOOD TEST: CPT

## 2022-04-04 PROCEDURE — 2580000003 HC RX 258: Performed by: NEUROLOGICAL SURGERY

## 2022-04-04 PROCEDURE — 2700000000 HC OXYGEN THERAPY PER DAY

## 2022-04-04 PROCEDURE — 6370000000 HC RX 637 (ALT 250 FOR IP): Performed by: STUDENT IN AN ORGANIZED HEALTH CARE EDUCATION/TRAINING PROGRAM

## 2022-04-04 PROCEDURE — 97535 SELF CARE MNGMENT TRAINING: CPT

## 2022-04-04 PROCEDURE — 99232 SBSQ HOSP IP/OBS MODERATE 35: CPT | Performed by: SURGERY

## 2022-04-04 PROCEDURE — 6370000000 HC RX 637 (ALT 250 FOR IP): Performed by: NEUROLOGICAL SURGERY

## 2022-04-04 PROCEDURE — 1200000000 HC SEMI PRIVATE

## 2022-04-04 RX ADMIN — ATORVASTATIN CALCIUM 40 MG: 40 TABLET, FILM COATED ORAL at 08:34

## 2022-04-04 RX ADMIN — SENNOSIDES AND DOCUSATE SODIUM 1 TABLET: 8.6; 5 TABLET ORAL at 20:53

## 2022-04-04 RX ADMIN — PANTOPRAZOLE SODIUM 40 MG: 40 TABLET, DELAYED RELEASE ORAL at 05:55

## 2022-04-04 RX ADMIN — OXYCODONE HYDROCHLORIDE 10 MG: 10 TABLET ORAL at 05:54

## 2022-04-04 RX ADMIN — GABAPENTIN 300 MG: 300 CAPSULE ORAL at 20:53

## 2022-04-04 RX ADMIN — METOPROLOL TARTRATE 12.5 MG: 25 TABLET, FILM COATED ORAL at 20:53

## 2022-04-04 RX ADMIN — OXYCODONE HYDROCHLORIDE 10 MG: 10 TABLET ORAL at 10:43

## 2022-04-04 RX ADMIN — ACETAMINOPHEN 650 MG: 325 TABLET ORAL at 03:52

## 2022-04-04 RX ADMIN — ZOLPIDEM TARTRATE 10 MG: 5 TABLET, FILM COATED ORAL at 20:54

## 2022-04-04 RX ADMIN — POLYETHYLENE GLYCOL 3350 17 G: 17 POWDER, FOR SOLUTION ORAL at 08:34

## 2022-04-04 RX ADMIN — OXYCODONE HYDROCHLORIDE 10 MG: 10 TABLET ORAL at 20:53

## 2022-04-04 RX ADMIN — BISACODYL 5 MG: 5 TABLET, COATED ORAL at 08:34

## 2022-04-04 RX ADMIN — LOSARTAN POTASSIUM 25 MG: 25 TABLET, FILM COATED ORAL at 08:34

## 2022-04-04 RX ADMIN — SENNOSIDES AND DOCUSATE SODIUM 1 TABLET: 8.6; 5 TABLET ORAL at 08:34

## 2022-04-04 RX ADMIN — OXYCODONE HYDROCHLORIDE 10 MG: 10 TABLET ORAL at 16:57

## 2022-04-04 RX ADMIN — GABAPENTIN 300 MG: 300 CAPSULE ORAL at 08:34

## 2022-04-04 RX ADMIN — CYCLOBENZAPRINE 10 MG: 10 TABLET, FILM COATED ORAL at 20:53

## 2022-04-04 RX ADMIN — CYCLOBENZAPRINE 10 MG: 10 TABLET, FILM COATED ORAL at 05:55

## 2022-04-04 RX ADMIN — ACETAMINOPHEN 650 MG: 325 TABLET ORAL at 10:43

## 2022-04-04 RX ADMIN — Medication 10 ML: at 20:58

## 2022-04-04 RX ADMIN — TAMSULOSIN HYDROCHLORIDE 0.4 MG: 0.4 CAPSULE ORAL at 08:34

## 2022-04-04 RX ADMIN — ACETAMINOPHEN 650 MG: 325 TABLET ORAL at 16:57

## 2022-04-04 RX ADMIN — ONDANSETRON 4 MG: 2 INJECTION INTRAMUSCULAR; INTRAVENOUS at 08:31

## 2022-04-04 RX ADMIN — Medication 10 ML: at 08:32

## 2022-04-04 RX ADMIN — METOPROLOL TARTRATE 12.5 MG: 25 TABLET, FILM COATED ORAL at 08:34

## 2022-04-04 ASSESSMENT — PAIN SCALES - GENERAL
PAINLEVEL_OUTOF10: 7
PAINLEVEL_OUTOF10: 0
PAINLEVEL_OUTOF10: 7
PAINLEVEL_OUTOF10: 9
PAINLEVEL_OUTOF10: 9

## 2022-04-04 ASSESSMENT — PAIN DESCRIPTION - PROGRESSION: CLINICAL_PROGRESSION: GRADUALLY IMPROVING

## 2022-04-04 ASSESSMENT — PAIN DESCRIPTION - LOCATION
LOCATION: BACK

## 2022-04-04 ASSESSMENT — PAIN DESCRIPTION - PAIN TYPE
TYPE: SURGICAL PAIN

## 2022-04-04 ASSESSMENT — PAIN DESCRIPTION - FREQUENCY
FREQUENCY: CONTINUOUS

## 2022-04-04 ASSESSMENT — PAIN - FUNCTIONAL ASSESSMENT: PAIN_FUNCTIONAL_ASSESSMENT: PREVENTS OR INTERFERES SOME ACTIVE ACTIVITIES AND ADLS

## 2022-04-04 ASSESSMENT — PAIN DESCRIPTION - ONSET
ONSET: ON-GOING

## 2022-04-04 ASSESSMENT — PAIN DESCRIPTION - DESCRIPTORS
DESCRIPTORS: ACHING
DESCRIPTORS: ACHING;DISCOMFORT;CONSTANT
DESCRIPTORS: ACHING;DISCOMFORT;CONSTANT

## 2022-04-04 NOTE — PLAN OF CARE
Problem: Discharge Planning:  Goal: Discharged to appropriate level of care  Description: Discharged to appropriate level of care  Outcome: Ongoing     Problem: Infection - Surgical Site:  Goal: Will show no infection signs and symptoms  Description: Will show no infection signs and symptoms  Outcome: Ongoing     Problem: Mobility - Impaired:  Goal: Mobility will improve to maximum level  Description: Mobility will improve to maximum level  Outcome: Ongoing     Problem: Pain - Acute:  Goal: Pain level will decrease  Description: Pain level will decrease  Outcome: Ongoing     Problem: Sensory Perception - Impaired:  Goal: Sensory function intact, lower extremity  Description: Sensory function intact, lower extremity  Outcome: Ongoing     Problem: Falls - Risk of:  Goal: Will remain free from falls  Description: Will remain free from falls  Outcome: Ongoing  Goal: Absence of physical injury  Description: Absence of physical injury  Outcome: Ongoing     Problem: Skin Integrity:  Goal: Will show no infection signs and symptoms  Description: Will show no infection signs and symptoms  Outcome: Ongoing  Goal: Absence of new skin breakdown  Description: Absence of new skin breakdown  Outcome: Ongoing     Problem: Pain:  Goal: Pain level will decrease  Description: Pain level will decrease  Outcome: Ongoing  Goal: Control of acute pain  Description: Control of acute pain  Outcome: Ongoing  Goal: Control of chronic pain  Description: Control of chronic pain  Outcome: Ongoing     Problem: Musculor/Skeletal Functional Status  Goal: Highest potential functional level  Outcome: Ongoing  Goal: Absence of falls  Outcome: Ongoing     Problem: Discharge Planning:  Goal: Discharged to appropriate level of care  Description: Discharged to appropriate level of care  Outcome: Ongoing     Problem: Serum Glucose Level - Abnormal:  Goal: Ability to maintain appropriate glucose levels will improve  Description: Ability to maintain appropriate glucose levels will improve  Outcome: Ongoing     Problem: Sensory Perception - Impaired:  Goal: Ability to maintain a stable neurologic state will improve  Description: Ability to maintain a stable neurologic state will improve  Outcome: Ongoing

## 2022-04-04 NOTE — PLAN OF CARE
breakdown  Description: Absence of new skin breakdown  4/4/2022 0950 by Becki Hadley RN  Outcome: Completed  4/3/2022 2211 by Tita Fernandez RN  Outcome: Ongoing     Problem: Pain:  Goal: Pain level will decrease  Description: Pain level will decrease  4/4/2022 0950 by Becki Hadley RN  Outcome: Completed  4/3/2022 2211 by Tita Fernandez RN  Outcome: Ongoing  Goal: Control of acute pain  Description: Control of acute pain  4/4/2022 0950 by Becki Hadley RN  Outcome: Completed  4/3/2022 2211 by Tita Fernandez RN  Outcome: Ongoing  Goal: Control of chronic pain  Description: Control of chronic pain  4/4/2022 0950 by Becki Hadley RN  Outcome: Completed  4/3/2022 2211 by Tita Fernandez RN  Outcome: Ongoing     Problem: Musculor/Skeletal Functional Status  Goal: Highest potential functional level  4/4/2022 0950 by Becki Hadley RN  Outcome: Completed  4/3/2022 2211 by Tita Fernandez RN  Outcome: Ongoing  Goal: Absence of falls  4/4/2022 0950 by Becki Hadley RN  Outcome: Completed  4/3/2022 2211 by Tita Fernandez RN  Outcome: Ongoing     Problem: Discharge Planning:  Goal: Discharged to appropriate level of care  Description: Discharged to appropriate level of care  4/4/2022 0950 by Becki Hadley RN  Outcome: Completed  4/3/2022 2211 by Tita Fernandez RN  Outcome: Ongoing     Problem: Serum Glucose Level - Abnormal:  Goal: Ability to maintain appropriate glucose levels will improve  Description: Ability to maintain appropriate glucose levels will improve  4/4/2022 0950 by Becki Hadley RN  Outcome: Completed  4/3/2022 2211 by Tita Fernandez RN  Outcome: Ongoing     Problem: Sensory Perception - Impaired:  Goal: Ability to maintain a stable neurologic state will improve  Description: Ability to maintain a stable neurologic state will improve  4/4/2022 0950 by Becki Hadley RN  Outcome: Completed  4/3/2022 2211 by Tita Fernandez RN  Outcome: Ongoing

## 2022-04-04 NOTE — PROGRESS NOTES
Department of Neurosurgery  Progress Note    CHIEF COMPLAINT: s/p lumbar fusion    SUBJECTIVE:  +BM. Nausea this AM. Nemo op site pain. No new issues overnight. REVIEW OF SYSTEMS :  Constitutional: Negative for chills and fever. Neurological: Negative for dizziness, tremors and speech change.      OBJECTIVE:   VITALS:  BP (!) 130/44   Pulse 71   Temp 97.7 °F (36.5 °C) (Temporal)   Resp 18   Ht 5' 5\" (1.651 m)   Wt 139 lb (63 kg)   SpO2 91%   BMI 23.13 kg/m²   PHYSICAL:  CONSTITUTIONAL:  awake, alert, cooperative, no apparent distress, and appears stated age    DATA:  CBC:   Lab Results   Component Value Date    WBC 17.9 03/31/2022    RBC 4.21 03/31/2022    HGB 13.1 03/31/2022    HCT 40.1 03/31/2022    MCV 95.2 03/31/2022    MCH 31.1 03/31/2022    MCHC 32.7 03/31/2022    RDW 14.8 03/31/2022     03/31/2022    MPV 12.1 03/31/2022     BMP:    Lab Results   Component Value Date     03/31/2022    K 4.6 03/31/2022    K 3.9 03/24/2022     03/31/2022    CO2 23 03/31/2022    BUN 18 03/31/2022    LABALBU 3.8 11/05/2021    LABALBU 3.9 03/17/2011    CREATININE 1.0 03/31/2022    CALCIUM 8.7 03/31/2022    GFRAA >60 03/31/2022    LABGLOM >60 03/31/2022    GLUCOSE 135 03/31/2022    GLUCOSE 109 03/21/2011     PT/INR:    Lab Results   Component Value Date    PROTIME 11.1 03/24/2022    PROTIME 11.8 03/17/2011    INR 1.0 03/24/2022     PTT:    Lab Results   Component Value Date    APTT 30.2 07/17/2020   [APTT}    Current Inpatient Medications  Current Facility-Administered Medications: tamsulosin (FLOMAX) capsule 0.4 mg, 0.4 mg, Oral, Daily  bisacodyl (DULCOLAX) suppository 10 mg, 10 mg, Rectal, Daily  polyethylene glycol (GLYCOLAX) packet 17 g, 17 g, Oral, BID  fleet rectal enema 1 enema, 1 enema, Rectal, Daily  atorvastatin (LIPITOR) tablet 40 mg, 40 mg, Oral, Daily  empagliflozin (JARDIANCE) tablet TABS 25 mg, 25 mg, Oral, Daily  gabapentin (NEURONTIN) capsule 300 mg, 300 mg, Oral, BID  losartan (COZAAR) tablet 25 mg, 25 mg, Oral, Daily  metoprolol tartrate (LOPRESSOR) tablet 12.5 mg, 12.5 mg, Oral, BID  pantoprazole (PROTONIX) tablet 40 mg, 40 mg, Oral, QAM AC  zolpidem (AMBIEN) tablet 10 mg, 10 mg, Oral, QPM  sodium chloride flush 0.9 % injection 5-40 mL, 5-40 mL, IntraVENous, 2 times per day  sodium chloride flush 0.9 % injection 5-40 mL, 5-40 mL, IntraVENous, PRN  0.9 % sodium chloride infusion, 25 mL, IntraVENous, PRN  acetaminophen (TYLENOL) tablet 650 mg, 650 mg, Oral, Q6H  ondansetron (ZOFRAN-ODT) disintegrating tablet 4 mg, 4 mg, Oral, Q8H PRN **OR** ondansetron (ZOFRAN) injection 4 mg, 4 mg, IntraVENous, Q6H PRN  0.9 % sodium chloride infusion, , IntraVENous, Continuous  oxyCODONE (ROXICODONE) immediate release tablet 5 mg, 5 mg, Oral, Q4H PRN **OR** oxyCODONE HCl (OXY-IR) immediate release tablet 10 mg, 10 mg, Oral, Q4H PRN  cyclobenzaprine (FLEXERIL) tablet 10 mg, 10 mg, Oral, TID PRN  diphenhydrAMINE (BENADRYL) tablet 25 mg, 25 mg, Oral, Q6H PRN **OR** diphenhydrAMINE (BENADRYL) injection 25 mg, 25 mg, IntraVENous, Q6H PRN  bisacodyl (DULCOLAX) EC tablet 5 mg, 5 mg, Oral, Daily  sennosides-docusate sodium (SENOKOT-S) 8.6-50 MG tablet 1 tablet, 1 tablet, Oral, BID  benzocaine-menthol (CEPACOL SORE THROAT) lozenge 1 lozenge, 1 lozenge, Oral, Q2H PRN  insulin lispro (HUMALOG) injection vial 0-12 Units, 0-12 Units, SubCUTAneous, TID WC  insulin lispro (HUMALOG) injection vial 0-6 Units, 0-6 Units, SubCUTAneous, Nightly  glucose (GLUTOSE) 40 % oral gel 15 g, 15 g, Oral, PRN  dextrose 50 % IV solution, 12.5 g, IntraVENous, PRN  glucagon (rDNA) injection 1 mg, 1 mg, IntraMUSCular, PRN  dextrose 5 % solution, 100 mL/hr, IntraVENous, PRN  promethazine (PHENERGAN) in sodium chloride 0.9% 50 mL IVPB 25 mg, 25 mg, IntraVENous, Q8H PRN    ASSESSMENT:   · S/p L5-S1 PLIF on 3/30 - stable  · IV pain control  · Drain management    PLAN:  · Admit  · PT/OT  · WBAT with brace  · Pain management  · Follow up in neurosurgery clinic in 2 weeks for staple removal and in 4 weeks with x-rays  · Discharge planning. Patient does not have a ride today, plan for discharge tomorrow. Scripts sent down to pharmacy. Home with [facility].       Electronically signed by Hamlet Raymundo PA-C on 4/4/2022 at 10:21 AM

## 2022-04-04 NOTE — PROGRESS NOTES
General Surgery Progress Note:    CC: post op ileus     S: doing well roma diet moving bowels       Objective:  @BP (!) 120/52   Pulse 70   Temp 98.1 °F (36.7 °C) (Temporal)   Resp 18   Ht 5' 5\" (1.651 m)   Wt 139 lb (63 kg)   SpO2 91%   BMI 23.13 kg/m² @    Physical -     Gen: NAD  Resp: Breathing Comfortably, no resp distress clear b/l   CV: Reg Rate no extra heart sounds   Abd: soft non tender   EXT nvi     Assessment/Plan: s/p PLIF with post op ileus      ileus resolving, ok for diet,   Surgery to s/o call as needed           Meseret Huber MD FACS   See d/c summary     2:05 PM

## 2022-04-04 NOTE — PROGRESS NOTES
Occupational Therapy  OT BEDSIDE TREATMENT NOTE   [facility] [facility] [address] [facility]  [address] [place] [address]  Patient Name: Kelly Gardiner  MRN: [id number]  : 1945  Room: [address] [place] [address]     Per OT Eval:    Evaluating OT:Carla Auguste, OTR/L   License #  [id number]        Referring Provider:        Bria Girard MD        Specific Provider Orders/Date: OT evaluation & treatment        Diagnosis: LUMBAR STENOSIS    Pertinent Medical History:  has a past medical history of Anxiety, Asymptomatic bilateral carotid artery stenosis, Bowel obstruction ([address] CAD (coronary artery disease), COPD (chronic obstructive pulmonary disease) ([facility] [address] Cryptogenic stroke ([facility] [address] Diabetes mellitus ([facility] [address] Hyperlipidemia, Hypertension, and NSTEMI (non-ST elevated myocardial infarction) ([facility] [address] [id number]: L5-S1 POSTERIOR LUMBAR INTERBODY FUSION - O ARM, C ARM, JAYDEN TABLE, CELL SAVER, PLATELET GELL, AUDIOLOGY, CAGES, PLATES, GLOBUS    Past Surgical History:  has a past surgical history that includes colectomy; Cardiac catheterization (2016); Coronary artery bypass graft (16); Diagnostic Cardiac Cath Lab Procedure (2016); Insertable Cardiac Monitor (Left, 10/11/2016); back surgery; Insertable Cardiac Monitor (2021); Ankle fracture surgery (Left, ); and Lumbar spine surgery (N/A, 3/30/2022).      Precautions:  Fall Risk, neutral spine (no B,L,T), LSO brace, drain, bernardo, IV      Assessment of current deficits    [x]? ? Functional mobility            [x]? ?ADLs           [x]? ? Strength                  [x]? ? Cognition    [x]? ? Functional transfers          [x]? ? IADLs         [x]? ? Safety Awareness   [x]? ?Endurance    [x]? ? Fine Coordination                         [x]? ? Balance      []? ? Vision/perception   [x]? ? Sensation      []? ?Gross Motor Coordination             []? ? ROM           []? ? Delirium                   []? ? Motor Control    OT PLAN OF CARE   OT POC based on physician orders, patient diagnosis and results of clinical assessment     Frequency/Duration: 2-4 days/wk for 2 weeks PRN   Specific OT Treatment Interventions to include:   Instruction/training on adapted ADL techniques and AE recommendations to increase functional independence within precautions  Training on energy conservation strategies, correct breathing pattern and techniques to improve independence/tolerance for self-care routine  Functional transfer/mobility training/DME recommendations for increased independence, safety, and fall prevention  Patient/Family education to increase follow through with safety techniques and functional independence  Recommendation of environmental modifications for increased safety with functional transfers/mobility and ADLs  Cognitive retraining/development of therapeutic activities to improve problem solving, judgement, memory, and attention for increased safety/participation in ADL/IADL tasks  Therapeutic exercise to improve motor endurance, ROM, and functional strength for ADLs/functional transfers  Therapeutic activities to facilitate/challenge dynamic balance, stand tolerance for increased safety and independence with ADLs  Therapeutic activities to facilitate gross/fine motor skills for increased independence with ADLs  Positioning to improve skin integrity, interaction with environment and functional independence     Recommended Adaptive Equipment:  TBD      Home Living: Pt lives with wife in a 1 story with 4 steps to enter with 1 HR.  B&B on main level. Full flight to basement with HR  Bathroom setup: walk in shower, std. commode  Equipment owned: none     Prior Level of Function: Ind. with ADLs , Ind. with IADLs; ambulated no A.D.   Driving: active  Occupation: retired from [email or web address]     Pain Level: Pt did not complain of pain this session  Cognition: A&O: abdomen/4; Follows multi- step directions              Memory:  good              Sequencing:  good              Problem solving:  good              Judgement/safety:  fair+                Functional Assessment:  AM-PAC Daily Activity Raw Score: 17/24    Initial Eval Status  Date: 3-31-22 Treatment Status  Date: 4/4/22 STGs = LTGs  Time frame: 10-14 days   Feeding Ind.  Independent Mod I/ Ind   Grooming Set up SBA  Pt stood at sink and brushed teeth, combed hair Modified Carver    UB Dressing Max A with gown & LSO SBA  Pt donned/doffed hospital gown seated EOB. Pt donned brace, able to place around back with cueing, then able to fasten/tighten Modified Carver    LB Dressing Max A with figure 4 technique  modA  Pt donned/doffed hospital sock to R foot using cross over technique, cueing to maintain precautions. Family member then present, insisting on helping pt batool/doff L sock    Per pt and family, will have assist with LB dressing task as needed at home [address] with sim. task Tony  Simulated Task Modified Carver    Toileting tova JIMENEZ  Modified Carver    Bed Mobility  Logroll: Min A  Supine to sit: Min A  Sit to supine: NT  Supervision  Supine<>EOB    Log roll technique Supine to sit: Modified Carver   Sit to supine: Modified Carver    Functional Transfers Min A with sit <> stand, SPT with ww  SBA  Sit to Stand  Stand to Sit    Cueing for hand placement Modified Carver    Functional Mobility Min A with ww functional home distances SBA  Pt ambulated short household distance in room EOB<>Bathroom, and then within bowser per pt's request with w.w.  Modified Carver    Balance Sitting:     Static:  Sup    Dynamic:SBA  Standing: Min A Sitting:  Independent    Standing:  SBA      Activity Tolerance Fair with lt. ax.  Fair- Good with mod. ax. Visual/  Perceptual Glasses: readers          Vitals spO2 91-96% on RA  1. BP: 156/56  2.  /67   WFL      Hand Dominance L    AROM (PROM) Strength Additional Info:    RUE  WFL 4/5 good  and wfl FMC/dexterity noted during ADL tasks      LUE WFL 4/5 good  and wfl FMC/dexterity noted during ADL tasks         Hearing: WFL   Sensation:  No c/o numbness or tingling B UE  Tone: WFL B UE  Edema: none noted B UE        Education:  Pt was educated on role of OT, goals to be reached, importance of OOB activity, precautions to follow, log roll technique with bed mobility, safety and hand placement with transfers, safety and walker management with functional mobility, and techniques to assist with LB dressing/bathing tasks. Comments: Upon arrival pt supine in bed, agreeable to therapy, family present, speaking with nursing okaying pt to be seen this session. Pt completed of bed mobility, functional mobility, transfers and ADL tasks this session. Pt requiring of cueing for safety throughout session, with cueing to maintain of spinal precautions. At end of session, pt seated upright in chair, all tubes and lines intact, call light within reach, family still present. · Pt has made fair progress towards set goals.    · Continue with current plan of care focusing on increasing of independency with transfers and ADL tasks      Treatment Time In: 2:30pm          Treatment Time Out: 2:48pm             Treatment Charges: Mins Units   Ther Ex  [id number]     Manual Therapy 09160     Thera Activities [id number] 5    ADL/Home Mgt [id number] 13 1   Neuro Re-ed 10820     Group Therapy      Orthotic manage/training  07134     Non-Billable Time     Total Timed Treatment 18 1        Alicia HAWKINS/L [id number]

## 2022-04-04 NOTE — CARE COORDINATION
4/4/22 Update CM Note: POD 5 PLIF. He is on a general diet. He continued with flds 50hr. He is to be out of bed and ambulating. He did have a bm yesterday. He had low grade temp 100.0/99.3. He remains on 2lnc but with room air sat 88%. Drops also during sleep. He will need pulse oximeter readings. He was denied ARU. He will discharge to home with homecare and orders are placed. Will choice patient on homecare agencies. Plan per Surgery is for possible discharge in the am. PT 20/24. Will await pulse oximeter readings for possible need for home oxygen. Electronically signed by Hudson Love RN CM on 4/4/2022 at 10:28 AM       The Plan for Transition of Care is related to the following treatment goals: home care      The Patient and/or patient representative  was provided with a choice of provider and agrees   with the discharge plan. [x] Yes [] No    Freedom of choice list was provided with basic dialogue that supports the patient's individualized plan of care/goals, treatment preferences and shares the quality data associated with the providers.  [x] Yes [] No

## 2022-04-04 NOTE — PROGRESS NOTES
Physical Therapy  Rx    Name: Korey Merritt  : 1945  MRN: [id number]      Date of Service: 2022    Evaluating PT:  Kevin Pineda PT, DPT [id number]     Room #:  9082/3916-Q  Diagnosis:  Lumbar stenosis with neurogenic claudication [M48.062]  Reason for admission: back pain  Precautions:  Falls, spinal neutral, LSO, hemovac   Procedure/Surgery:  3/30 L5-S1 PLIF  Equipment Recommendations:  FWW  (new walker present in the room)    SUBJECTIVE:  Pt lives with his wife in a 1 story home with 4 DESTINEY either front or back with 0 or 1 rail respectively. Pt ambulated with no AD PTA. OBJECTIVE:   Initial Evaluation  Date: 3/31 Treatment  22 Short Term/ Long Term   Goals   AM-PAC 6 Clicks 58/60 70/29    Was pt agreeable to Eval/treatment? Yes  Yes     Does pt have pain? 5/10 abdomen  Minimal       Bed Mobility  Rolling: Graham  Supine to sit: Graham  Sit to supine: NT  Scooting: Graham Rolling: Supervision  Supine to sit: Supervision   Sit to supine: NT  Scooting: Supervision  VCs for spinal precautions Independent    Transfers Sit to stand: Graham  Stand to sit: Graham  Stand pivot: NT Sit to stand: Supervision   Stand to sit: Supervision   Stand pivot: SBA using [employer] Independent    Ambulation    35 feet with [employer] Graham   150 ft with [employer] Supervision   Seated rest break then gait 150 feet.   >200 feet with AAD Mod I vs independent   Stair negotiation: ascended and descended  NT NT  4 steps with 1 rail Mod I      Balance:   Sitting static: Independent  Sitting dynamic: Supervision  Standing static: Supervision   Standing dynamic: Supervision [employer]    -Pt is A & O x 3  -Sensation:  Pt denies numbness and tingling to extremities  -Edema:  unremarkable     Therapeutic Exercises:  Functional activity   Vitals  Supine: RA 91% 82bpm  Seated: RA 90% 85bpm  Gait with fww: RA 90% to 92% -bpm    Patient education  Pt educated on safety, sequencing of transfers, and role of PT    Patient response to education:   Pt verbalized understanding Pt demonstrated skill Pt requires further education in this area   Yes  Yes  Yes      ASSESSMENT:  Conditions Requiring Skilled Therapeutic Intervention:  []Decreased strength     []Decreased ROM  [x]Decreased functional mobility  [x]Decreased balance   [x]Decreased endurance   []Decreased posture  []Decreased sensation  []Decreased coordination   []Decreased vision  [x]Decreased safety awareness   [x]Increased pain     Comments:  . Pt received supine in bed on RA and agreeable to PT session. Pt required cues for bed mobility and use of spinal precautions. Cues to don LSO provided. VCs for B knee extension in standing and use of WW due to pt being slightly impulsive and attempting amb without AD initially. Vitals monitored throughout ambulation. He amb with fair gait speed. Reviewed spinal precautions with patient following gait. Also verbally reviewed sequencing for step negotiation. Pt was left sitting EOB. t would benefit from continued PT POC to address functional deficits described above. RN notified that pt was up in chair and on RA. Treatment:  Patient practiced and was instructed in the following treatment:     Therapeutic activity  o Patient education provided continuously throughout session for sequencing, safety maintenance, and improving any deficits found during the evaluation. o STS and pivot transfer training - pt educated on proper hand and foot placement, safety and sequencing, and use of proper technique to safely complete sit<>stand and pivot transfers with hands on assistance to complete task safely   o Gait training- pt was given verbal and tactile cues to facilitate improved speed and balance during ambulation as well as provided with physical assistance. Education regarding application of LSO and sequencing for step negotiation.    BLE for spinal neuromobilization    PHYSICAL THERAPY PLAN OF CARE:    PT POC is established based on physician order and patient diagnosis

## 2022-04-05 VITALS
BODY MASS INDEX: 23.16 KG/M2 | TEMPERATURE: 98.5 F | DIASTOLIC BLOOD PRESSURE: 66 MMHG | HEART RATE: 75 BPM | WEIGHT: 139 LBS | HEIGHT: 65 IN | RESPIRATION RATE: 18 BRPM | SYSTOLIC BLOOD PRESSURE: 132 MMHG | OXYGEN SATURATION: 94 %

## 2022-04-05 LAB
METER GLUCOSE: 111 MG/DL (ref 74–99)
METER GLUCOSE: 121 MG/DL (ref 74–99)

## 2022-04-05 PROCEDURE — 97530 THERAPEUTIC ACTIVITIES: CPT

## 2022-04-05 PROCEDURE — 6370000000 HC RX 637 (ALT 250 FOR IP): Performed by: NEUROLOGICAL SURGERY

## 2022-04-05 PROCEDURE — 82962 GLUCOSE BLOOD TEST: CPT

## 2022-04-05 PROCEDURE — 2580000003 HC RX 258: Performed by: NEUROLOGICAL SURGERY

## 2022-04-05 PROCEDURE — 6370000000 HC RX 637 (ALT 250 FOR IP): Performed by: STUDENT IN AN ORGANIZED HEALTH CARE EDUCATION/TRAINING PROGRAM

## 2022-04-05 RX ADMIN — TAMSULOSIN HYDROCHLORIDE 0.4 MG: 0.4 CAPSULE ORAL at 08:19

## 2022-04-05 RX ADMIN — GABAPENTIN 300 MG: 300 CAPSULE ORAL at 08:19

## 2022-04-05 RX ADMIN — POLYETHYLENE GLYCOL 3350 17 G: 17 POWDER, FOR SOLUTION ORAL at 08:22

## 2022-04-05 RX ADMIN — ACETAMINOPHEN 650 MG: 325 TABLET ORAL at 05:54

## 2022-04-05 RX ADMIN — ATORVASTATIN CALCIUM 40 MG: 40 TABLET, FILM COATED ORAL at 08:19

## 2022-04-05 RX ADMIN — Medication 10 ML: at 08:22

## 2022-04-05 RX ADMIN — CYCLOBENZAPRINE 10 MG: 10 TABLET, FILM COATED ORAL at 05:54

## 2022-04-05 RX ADMIN — PANTOPRAZOLE SODIUM 40 MG: 40 TABLET, DELAYED RELEASE ORAL at 05:54

## 2022-04-05 RX ADMIN — BISACODYL 5 MG: 5 TABLET, COATED ORAL at 08:19

## 2022-04-05 RX ADMIN — METOPROLOL TARTRATE 12.5 MG: 25 TABLET, FILM COATED ORAL at 08:20

## 2022-04-05 RX ADMIN — LOSARTAN POTASSIUM 25 MG: 25 TABLET, FILM COATED ORAL at 08:19

## 2022-04-05 RX ADMIN — ACETAMINOPHEN 650 MG: 325 TABLET ORAL at 11:40

## 2022-04-05 RX ADMIN — OXYCODONE HYDROCHLORIDE 10 MG: 10 TABLET ORAL at 05:55

## 2022-04-05 RX ADMIN — SENNOSIDES AND DOCUSATE SODIUM 1 TABLET: 8.6; 5 TABLET ORAL at 08:19

## 2022-04-05 ASSESSMENT — PAIN DESCRIPTION - FREQUENCY
FREQUENCY: CONTINUOUS
FREQUENCY: INTERMITTENT
FREQUENCY: CONTINUOUS
FREQUENCY: CONTINUOUS

## 2022-04-05 ASSESSMENT — PAIN DESCRIPTION - LOCATION
LOCATION: BACK

## 2022-04-05 ASSESSMENT — PAIN DESCRIPTION - PROGRESSION
CLINICAL_PROGRESSION: NOT CHANGED

## 2022-04-05 ASSESSMENT — PAIN DESCRIPTION - ORIENTATION
ORIENTATION: LOWER;MID
ORIENTATION: LOWER;MID

## 2022-04-05 ASSESSMENT — PAIN DESCRIPTION - PAIN TYPE
TYPE: SURGICAL PAIN

## 2022-04-05 ASSESSMENT — PAIN DESCRIPTION - DESCRIPTORS
DESCRIPTORS: SORE;BURNING
DESCRIPTORS: ACHING
DESCRIPTORS: ACHING

## 2022-04-05 ASSESSMENT — PAIN - FUNCTIONAL ASSESSMENT
PAIN_FUNCTIONAL_ASSESSMENT: PREVENTS OR INTERFERES SOME ACTIVE ACTIVITIES AND ADLS

## 2022-04-05 ASSESSMENT — PAIN DESCRIPTION - ONSET
ONSET: ON-GOING

## 2022-04-05 ASSESSMENT — PAIN SCALES - GENERAL
PAINLEVEL_OUTOF10: 5
PAINLEVEL_OUTOF10: 7
PAINLEVEL_OUTOF10: 7
PAINLEVEL_OUTOF10: 5

## 2022-04-05 NOTE — PROGRESS NOTES
Department of Neurosurgery  Progress Note    CHIEF COMPLAINT: s/p lumbar fusion    SUBJECTIVE:  No new issues overnight. Plan for discharge today. REVIEW OF SYSTEMS :  Constitutional: Negative for chills and fever. Neurological: Negative for dizziness, tremors and speech change.      OBJECTIVE:   VITALS:  /60   Pulse 80   Temp 98.4 °F (36.9 °C) (Oral)   Resp 20   Ht 5' 5\" (1.651 m)   Wt 139 lb (63 kg)   SpO2 91%   BMI 23.13 kg/m²   PHYSICAL:  CONSTITUTIONAL:  awake, alert, cooperative, no apparent distress, and appears stated age    DATA:  CBC:   Lab Results   Component Value Date    WBC 17.9 03/31/2022    RBC 4.21 03/31/2022    HGB 13.1 03/31/2022    HCT 40.1 03/31/2022    MCV 95.2 03/31/2022    MCH 31.1 03/31/2022    MCHC 32.7 03/31/2022    RDW 14.8 03/31/2022     03/31/2022    MPV 12.1 03/31/2022     BMP:    Lab Results   Component Value Date     03/31/2022    K 4.6 03/31/2022    K 3.9 03/24/2022     03/31/2022    CO2 23 03/31/2022    BUN 18 03/31/2022    LABALBU 3.8 11/05/2021    LABALBU 3.9 03/17/2011    CREATININE 1.0 03/31/2022    CALCIUM 8.7 03/31/2022    GFRAA >60 03/31/2022    LABGLOM >60 03/31/2022    GLUCOSE 135 03/31/2022    GLUCOSE 109 03/21/2011     PT/INR:    Lab Results   Component Value Date    PROTIME 11.1 03/24/2022    PROTIME 11.8 03/17/2011    INR 1.0 03/24/2022     PTT:    Lab Results   Component Value Date    APTT 30.2 07/17/2020   [APTT}    Current Inpatient Medications  Current Facility-Administered Medications: tamsulosin (FLOMAX) capsule 0.4 mg, 0.4 mg, Oral, Daily  bisacodyl (DULCOLAX) suppository 10 mg, 10 mg, Rectal, Daily  polyethylene glycol (GLYCOLAX) packet 17 g, 17 g, Oral, BID  fleet rectal enema 1 enema, 1 enema, Rectal, Daily  atorvastatin (LIPITOR) tablet 40 mg, 40 mg, Oral, Daily  empagliflozin (JARDIANCE) tablet TABS 25 mg, 25 mg, Oral, Daily  gabapentin (NEURONTIN) capsule 300 mg, 300 mg, Oral, BID  losartan (COZAAR) tablet 25 mg, 25 mg, Oral, Daily  metoprolol tartrate (LOPRESSOR) tablet 12.5 mg, 12.5 mg, Oral, BID  pantoprazole (PROTONIX) tablet 40 mg, 40 mg, Oral, QAM AC  zolpidem (AMBIEN) tablet 10 mg, 10 mg, Oral, QPM  sodium chloride flush 0.9 % injection 5-40 mL, 5-40 mL, IntraVENous, 2 times per day  sodium chloride flush 0.9 % injection 5-40 mL, 5-40 mL, IntraVENous, PRN  0.9 % sodium chloride infusion, 25 mL, IntraVENous, PRN  acetaminophen (TYLENOL) tablet 650 mg, 650 mg, Oral, Q6H  ondansetron (ZOFRAN-ODT) disintegrating tablet 4 mg, 4 mg, Oral, Q8H PRN **OR** ondansetron (ZOFRAN) injection 4 mg, 4 mg, IntraVENous, Q6H PRN  0.9 % sodium chloride infusion, , IntraVENous, Continuous  oxyCODONE (ROXICODONE) immediate release tablet 5 mg, 5 mg, Oral, Q4H PRN **OR** oxyCODONE HCl (OXY-IR) immediate release tablet 10 mg, 10 mg, Oral, Q4H PRN  cyclobenzaprine (FLEXERIL) tablet 10 mg, 10 mg, Oral, TID PRN  diphenhydrAMINE (BENADRYL) tablet 25 mg, 25 mg, Oral, Q6H PRN **OR** diphenhydrAMINE (BENADRYL) injection 25 mg, 25 mg, IntraVENous, Q6H PRN  bisacodyl (DULCOLAX) EC tablet 5 mg, 5 mg, Oral, Daily  sennosides-docusate sodium (SENOKOT-S) 8.6-50 MG tablet 1 tablet, 1 tablet, Oral, BID  benzocaine-menthol (CEPACOL SORE THROAT) lozenge 1 lozenge, 1 lozenge, Oral, Q2H PRN  insulin lispro (HUMALOG) injection vial 0-12 Units, 0-12 Units, SubCUTAneous, TID WC  insulin lispro (HUMALOG) injection vial 0-6 Units, 0-6 Units, SubCUTAneous, Nightly  glucose (GLUTOSE) 40 % oral gel 15 g, 15 g, Oral, PRN  dextrose 50 % IV solution, 12.5 g, IntraVENous, PRN  glucagon (rDNA) injection 1 mg, 1 mg, IntraMUSCular, PRN  dextrose 5 % solution, 100 mL/hr, IntraVENous, PRN  promethazine (PHENERGAN) in sodium chloride 0.9% 50 mL IVPB 25 mg, 25 mg, IntraVENous, Q8H PRN    ASSESSMENT:   · S/p L5-S1 PLIF on 3/30 - stable  · IV pain control  · Drain management    PLAN:  · Admit  · PT/OT  · WBAT with brace  · Pain management  · Follow up in neurosurgery clinic in 2 weeks for staple removal and in 4 weeks with x-rays  · Discharge planning. Scripts sent down to pharmacy. Home with [facility].       Electronically signed by Hamlet Raymundo PA-C on 4/5/2022 at 9:18 AM

## 2022-04-05 NOTE — CARE COORDINATION
4/5/22 Update CM Note; POD 6 of PLIF. Patient is discharged to home today with [id number] [facility] to follow at discharge.  Orders for homecare are in and [id number] [facility] notified of discharge Electronically signed by Patti Brunner RN CM on 4/5/2022 at 11:09 AM

## 2022-04-05 NOTE — PROGRESS NOTES
Physical Therapy  Rx    Name: Korey Merritt  : 1945  MRN: [id number]      Date of Service: 2022    Evaluating PT:  Kevin Pineda PT, DPT [id number]     Room #:  7270/1709-D  Diagnosis:  Lumbar stenosis with neurogenic claudication [M48.062]  Reason for admission: back pain  Precautions:  Falls, spinal neutral, LSO,   Procedure/Surgery:  3/30 L5-S1 PLIF  Equipment Recommendations:  FWW  (new walker present in the room)    SUBJECTIVE:  Pt lives with his wife in a 1 story home with 4 DESTINEY either front or back with 0 or 1 rail respectively. Pt ambulated with no AD PTA. OBJECTIVE:   Initial Evaluation  Date: 3/31 Treatment  22 Short Term/ Long Term   Goals   AM-PAC 6 Clicks  42/    Was pt agreeable to Eval/treatment? Yes  Yes     Does pt have pain? 5/10 abdomen  Minimal       Bed Mobility  Rolling: Graham  Supine to sit: Graham  Sit to supine: NT  Scooting: Graham Rolling: NT  Supine to sit: NT  Sit to supine: NT  Scooting: NT Independent    Transfers Sit to stand: Graham  Stand to sit: Graham  Stand pivot: NT Sit to stand: Supervision   Stand to sit: Supervision   Stand pivot: Supervision using [employer] Independent    Ambulation    35 feet with [employer] Graham   >200 ft with [employer] Supervision    >200 feet with AAD Mod I vs independent   Stair negotiation: ascended and descended  NT Full flight 1 rail, supervision  Cues for safety 4 steps with 1 rail Mod I      Balance:   Sitting static: Independent  Sitting dynamic: Supervision  Standing static: Supervision   Standing dynamic: Supervision [employer]    -Pt is A & O x 3  -Sensation:  Pt denies numbness and tingling to extremities  -Edema:  unremarkable     Therapeutic Exercises:  Functional activity   Vitals  Seated: RA 91% 94bpm    Patient education  Pt educated on safety and sequencing during stair navigation. Patient instructed in proper don/dof of LSO. Patient instructed on body positioning for getting in and out of car.      Patient response to education:   Pt verbalized understanding Pt demonstrated skill Pt requires further education in this area   Yes  Yes  Yes      ASSESSMENT:  Conditions Requiring Skilled Therapeutic Intervention:  []Decreased strength     []Decreased ROM  [x]Decreased functional mobility  [x]Decreased balance   [x]Decreased endurance   []Decreased posture  []Decreased sensation  []Decreased coordination   []Decreased vision  [x]Decreased safety awareness   [x]Increased pain     Comments:  . Patient sitting EOB upon arrival for PT session and agreeable to treatment. Verbal cues provided for proper donning of LSO. Patient completed sit<>stand transfer with supervision and fww. Patient ambulated >200 feet with supervision and fww. Patient navigated full flight of stairs with 1 railing and supervision assistance. Therapist provided verbal cues for safety with stair navigation. Patient continues to require skilled PT intervention to address post-operative impairments. Treatment:  Patient practiced and was instructed in the following treatment:     Therapeutic activity  o Transfers: sit<>stand supervision assist with fww  o Ambulation: >200 feet supervision assist with fww  o Stair navigation:   Education regarding application of LSO. BLE for spinal neuromobilization    PHYSICAL THERAPY PLAN OF CARE:    PT POC is established based on physician order and patient diagnosis     Referring provider/PT Order: 03/30/22 1615   PT eval and treat Start: 03/30/22 1615, End: 03/30/22 1615, ONE TIME, Standing Count: 1 Occurrences, R    Arnoldo aDnielson MD    Diagnosis:  Lumbar stenosis with neurogenic claudication [M48.062]  Specific instructions for next treatment:  Progress gait and stairs as able.     Current Treatment Recommendations:   [] Strengthening to improve independence with functional mobility   [] ROM to improve independence with functional mobility   [x] Balance Training to improve static/dynamic balance and to reduce fall risk  [x] Endurance Training to improve activity tolerance during functional mobility   [x] Transfer Training to improve safety and independence with all functional transfers   [x] Gait Training to improve gait mechanics, endurance and asses need for appropriate assistive device  [x] Stair Training in preparation for safe discharge home and/or into the community   [x] Positioning to prevent skin breakdown and contractures  [x] Safety and Education Training   [] Patient/Caregiver Education   [] HEP  [] Other     Time in  1005  Time out  1130    Total Treatment Time  25 minutes     CPT codes:  [] Low Complexity PT evaluation 74485  [] Moderate Complexity PT evaluation 92549  [] High Complexity PT evaluation 79846  [] PT Re-evaluation 93064  [] Gait training 22826 - minutes  [] Manual therapy 44385 - minutes  [x] Therapeutic activities 86783 25 minutes  [] Therapeutic exercises 79621 - minutes  [] Neuromuscular reeducation 74443 - minutes     Kelsey Sanches, SPT  Bernerd Prader, [address] [facility]

## 2022-04-06 NOTE — DISCHARGE SUMMARY
Neurosurgery Surgery Discharge Summary    [facility] SUMMARY:                The patient is a 64 y.o. male who was admitted to the hospital on 3/30/2022  5:17 AM for treatment of lumbar stenosis. On the day of admission, a L5-S1 posterior lumbar interbody fusion was performed. The patient's hospital course consisted of physical therapy, incision observation, and a return to normal oral intake. Pt did have a post operative ileus that resolved while in the hospital.  The patient was discharged on 4/5/2022  2:00 PM tolerating a diet, moving bowels, and urinating without difficulty. The incisions were clean and intact. The patient was discharged home with [facility] in satisfactory condition with instructions to call the office for a follow up appointment. Hospital Problem List:  Principal Problem:    Lumbar stenosis with neurogenic claudication  Resolved Problems:    * No resolved hospital problems. *     Procedure(s) (LRB):  L5-S1 POSTERIOR LUMBAR INTERBODY FUSION - O ARM, C ARM, JAYDEN TABLE, CELL SAVER, PLATELET GELL, AUDIOLOGY, CAGES, PLATES, GLOBUS (N/A)    Discharge Medications:   Discharge Medication List as of 4/5/2022  1:20 PM      START taking these medications    Details   oxyCODONE (ROXICODONE) 5 MG immediate release tablet Take 1 tablet by mouth every 4 hours as needed for Pain for up to 7 days. , Disp-42 tablet, R-0Normal      cyclobenzaprine (FLEXERIL) 10 MG tablet Take 1 tablet by mouth 3 times daily as needed for Muscle spasms, Disp-30 tablet, R-0Normal         CONTINUE these medications which have NOT CHANGED    Details   zolpidem (AMBIEN) 10 MG tablet Take 1 tablet by mouth every evening for 30 days. , Disp-30 tablet, R-0Normal      losartan (COZAAR) 25 MG tablet Take 1 tablet by mouth daily, Disp-90 tablet, R-3PT NEEDS APPT FOR FURTHER REFILLSNormal      metoprolol tartrate (LOPRESSOR) 25 MG tablet Take 0.5 tablets by mouth 2 times daily, Disp-180 tablet, R-3Normal gabapentin (NEURONTIN) 300 MG capsule Take 300 mg by mouth 2 times daily.  Historical Med      atorvastatin (LIPITOR) 40 MG tablet Take 1 tablet by mouth daily, Disp-90 tablet, R-1Normal      empagliflozin (JARDIANCE) 25 MG tablet Take 25 mg by mouth daily, Disp-30 tablet, R-2Normal      pantoprazole (PROTONIX) 40 MG tablet TK 1 T PO QD, R-3Historical Med         STOP taking these medications       traMADol (ULTRAM) 50 MG tablet Comments:   Reason for Stopping:         clopidogrel (PLAVIX) 75 MG tablet Comments:   Reason for Stopping:               Taty Quiros PA-C  4/6/2022

## 2022-04-13 ENCOUNTER — OFFICE VISIT (OUTPATIENT)
Dept: NEUROSURGERY | Age: 77
End: 2022-04-13

## 2022-04-13 VITALS
WEIGHT: 139 LBS | OXYGEN SATURATION: 98 % | RESPIRATION RATE: 20 BRPM | DIASTOLIC BLOOD PRESSURE: 73 MMHG | HEIGHT: 65 IN | BODY MASS INDEX: 23.16 KG/M2 | TEMPERATURE: 98.2 F | SYSTOLIC BLOOD PRESSURE: 145 MMHG | HEART RATE: 64 BPM

## 2022-04-13 DIAGNOSIS — M48.061 SPINAL STENOSIS, LUMBAR REGION, WITHOUT NEUROGENIC CLAUDICATION: Primary | ICD-10-CM

## 2022-04-13 PROBLEM — Z01.818 PREOPERATIVE CLEARANCE: Status: RESOLVED | Noted: 2022-03-14 | Resolved: 2022-04-13

## 2022-04-13 PROCEDURE — 99024 POSTOP FOLLOW-UP VISIT: CPT | Performed by: PHYSICIAN ASSISTANT

## 2022-04-13 RX ORDER — CYCLOBENZAPRINE HCL 10 MG
10 TABLET ORAL 3 TIMES DAILY PRN
Qty: 30 TABLET | Refills: 0 | Status: SHIPPED | OUTPATIENT
Start: 2022-04-13 | End: 2022-04-23

## 2022-05-03 ENCOUNTER — OFFICE VISIT (OUTPATIENT)
Dept: NEUROSURGERY | Age: 77
End: 2022-05-03

## 2022-05-03 ENCOUNTER — HOSPITAL ENCOUNTER (OUTPATIENT)
Dept: GENERAL RADIOLOGY | Age: 77
Discharge: HOME | End: 2022-05-05
Payer: MEDICARE

## 2022-05-03 ENCOUNTER — HOSPITAL ENCOUNTER (OUTPATIENT)
Age: 77
Discharge: HOME | End: 2022-05-05
Payer: MEDICARE

## 2022-05-03 VITALS
WEIGHT: 120 LBS | TEMPERATURE: 97.7 F | HEART RATE: 71 BPM | HEIGHT: 66 IN | SYSTOLIC BLOOD PRESSURE: 155 MMHG | BODY MASS INDEX: 19.29 KG/M2 | DIASTOLIC BLOOD PRESSURE: 80 MMHG | OXYGEN SATURATION: 97 %

## 2022-05-03 DIAGNOSIS — G89.29 CHRONIC BILATERAL LOW BACK PAIN WITHOUT SCIATICA: ICD-10-CM

## 2022-05-03 DIAGNOSIS — M54.50 CHRONIC BILATERAL LOW BACK PAIN WITHOUT SCIATICA: ICD-10-CM

## 2022-05-03 DIAGNOSIS — Z98.1 S/P LUMBAR FUSION: ICD-10-CM

## 2022-05-03 DIAGNOSIS — Z98.1 S/P LUMBAR FUSION: Primary | ICD-10-CM

## 2022-05-03 PROCEDURE — 99024 POSTOP FOLLOW-UP VISIT: CPT | Performed by: PHYSICIAN ASSISTANT

## 2022-05-03 PROCEDURE — 72100 X-RAY EXAM L-S SPINE 2/3 VWS: CPT

## 2022-05-03 RX ORDER — TRAMADOL HYDROCHLORIDE 50 MG/1
50 TABLET ORAL EVERY 4 HOURS PRN
Qty: 42 TABLET | Refills: 0 | Status: SHIPPED
Start: 2022-05-03 | End: 2022-05-09

## 2022-05-03 NOTE — PROGRESS NOTES
Post Operative Follow-up    Patient is status post:lumbar fusion. Pre op leg pain gone. roma op site pain well. Physical Exam  Alert and Oriented X 3  PERRLA, EOMI  MARTINEZ 5/5  Wound: C/D/I    A/P: patient is s/p L5-S1 PLIF one month ago. X-rays stable. Continue with brace and restrictions. F/u 2 months.

## 2022-05-08 DIAGNOSIS — M54.50 CHRONIC BILATERAL LOW BACK PAIN WITHOUT SCIATICA: ICD-10-CM

## 2022-05-08 DIAGNOSIS — G89.29 CHRONIC BILATERAL LOW BACK PAIN WITHOUT SCIATICA: ICD-10-CM

## 2022-05-09 RX ORDER — TRAMADOL HYDROCHLORIDE 50 MG/1
TABLET ORAL
Qty: 42 TABLET | Refills: 0 | Status: SHIPPED
Start: 2022-05-09 | End: 2022-05-23 | Stop reason: SDUPTHER

## 2022-05-23 DIAGNOSIS — M54.50 CHRONIC BILATERAL LOW BACK PAIN WITHOUT SCIATICA: ICD-10-CM

## 2022-05-23 DIAGNOSIS — G89.29 CHRONIC BILATERAL LOW BACK PAIN WITHOUT SCIATICA: ICD-10-CM

## 2022-05-23 RX ORDER — TRAMADOL HYDROCHLORIDE 50 MG/1
50 TABLET ORAL EVERY 4 HOURS PRN
Qty: 42 TABLET | Refills: 0 | Status: SHIPPED
Start: 2022-05-23 | End: 2022-05-31

## 2022-05-28 DIAGNOSIS — G89.29 CHRONIC BILATERAL LOW BACK PAIN WITHOUT SCIATICA: ICD-10-CM

## 2022-05-28 DIAGNOSIS — M54.50 CHRONIC BILATERAL LOW BACK PAIN WITHOUT SCIATICA: ICD-10-CM

## 2022-05-31 RX ORDER — TRAMADOL HYDROCHLORIDE 50 MG/1
TABLET ORAL
Qty: 42 TABLET | Refills: 0 | Status: SHIPPED | OUTPATIENT
Start: 2022-05-31 | End: 2022-06-07

## 2022-06-23 ENCOUNTER — HOSPITAL ENCOUNTER (OUTPATIENT)
Age: 77
Discharge: HOME | End: 2022-06-25
Payer: MEDICARE

## 2022-06-23 ENCOUNTER — OFFICE VISIT (OUTPATIENT)
Dept: NEUROSURGERY | Age: 77
End: 2022-06-23

## 2022-06-23 ENCOUNTER — HOSPITAL ENCOUNTER (OUTPATIENT)
Dept: GENERAL RADIOLOGY | Age: 77
Discharge: HOME | End: 2022-06-25
Payer: MEDICARE

## 2022-06-23 VITALS
SYSTOLIC BLOOD PRESSURE: 146 MMHG | HEART RATE: 48 BPM | HEIGHT: 66 IN | TEMPERATURE: 97.9 F | WEIGHT: 120 LBS | DIASTOLIC BLOOD PRESSURE: 64 MMHG | BODY MASS INDEX: 19.29 KG/M2 | RESPIRATION RATE: 20 BRPM | OXYGEN SATURATION: 97 %

## 2022-06-23 DIAGNOSIS — M54.41 ACUTE MIDLINE LOW BACK PAIN WITH BILATERAL SCIATICA: ICD-10-CM

## 2022-06-23 DIAGNOSIS — M54.50 CHRONIC BILATERAL LOW BACK PAIN WITHOUT SCIATICA: ICD-10-CM

## 2022-06-23 DIAGNOSIS — G89.29 CHRONIC BILATERAL LOW BACK PAIN WITHOUT SCIATICA: ICD-10-CM

## 2022-06-23 DIAGNOSIS — M54.42 ACUTE MIDLINE LOW BACK PAIN WITH BILATERAL SCIATICA: ICD-10-CM

## 2022-06-23 DIAGNOSIS — Z98.1 S/P LUMBAR FUSION: ICD-10-CM

## 2022-06-23 DIAGNOSIS — M48.061 SPINAL STENOSIS, LUMBAR REGION, WITHOUT NEUROGENIC CLAUDICATION: Primary | ICD-10-CM

## 2022-06-23 PROCEDURE — 72100 X-RAY EXAM L-S SPINE 2/3 VWS: CPT

## 2022-06-23 PROCEDURE — 99024 POSTOP FOLLOW-UP VISIT: CPT | Performed by: PHYSICIAN ASSISTANT

## 2022-06-23 NOTE — PROGRESS NOTES
Post Operative Follow-up     This is a 76year old male who presents to the office for a 3 month follow-up s/p L5-S1 PLIF     Subjective: Patient states his leg pain has completely resolved, but he continues to have pretty sharp low back pain. Pain is worse at night. Takes Tramadol and Tylenol. Lumbar x-rays reviewed. Physical Exam:              WDWN, no apparent distress              Non-labored breathing               Vitals Stable              Alert and oriented x3              CN 3-12 intact              PERRL              EOMI              MARTINEZ well              Motor strength symmetric              Sensation to LT intact bilaterally   Incision healing well with no signs of infection                 Imaging: Lumbar x-rays 6/23/22: Stable alignment, stable fusion. No acute abnormalities noted. Final report pending. Assessment: This is a 76 y.o.  male presenting for a 3 month follow-up s/p L5-S1 PLIF. Plan:  -Pain control and expectations discussed  -No restrictions. Referral placed for PT  -OARRS report reviewed   -Follow-up in neurosurgery clinic in 9 months for 1 year follow up with repeat lumbar x-rays  -Call or return to neurosurgery office sooner if symptoms worsen or if new issues arise in the interim.     Electronically signed by Neda Dias PA-C on 6/23/2022 at 1:06 PM

## 2022-09-07 ENCOUNTER — APPOINTMENT (OUTPATIENT)
Dept: CT IMAGING | Age: 77
End: 2022-09-07
Payer: MEDICARE

## 2022-09-07 ENCOUNTER — HOSPITAL ENCOUNTER (OUTPATIENT)
Age: 77
Setting detail: OBSERVATION
Discharge: HOME | End: 2022-09-08
Attending: EMERGENCY MEDICINE | Admitting: INTERNAL MEDICINE
Payer: MEDICARE

## 2022-09-07 DIAGNOSIS — R42 DIZZINESS: Primary | ICD-10-CM

## 2022-09-07 DIAGNOSIS — I10 HYPERTENSION, UNSPECIFIED TYPE: ICD-10-CM

## 2022-09-07 LAB
ALBUMIN SERPL-MCNC: 4.3 G/DL (ref 3.5–5.2)
ALP BLD-CCNC: 170 U/L (ref 40–129)
ALT SERPL-CCNC: 23 U/L (ref 0–40)
ANION GAP SERPL CALCULATED.3IONS-SCNC: 11 MMOL/L (ref 7–16)
AST SERPL-CCNC: 27 U/L (ref 0–39)
BASOPHILS ABSOLUTE: 0.05 E9/L (ref 0–0.2)
BASOPHILS RELATIVE PERCENT: 0.5 % (ref 0–2)
BILIRUB SERPL-MCNC: 1.1 MG/DL (ref 0–1.2)
BUN BLDV-MCNC: 12 MG/DL (ref 6–23)
CALCIUM SERPL-MCNC: 9.8 MG/DL (ref 8.6–10.2)
CHLORIDE BLD-SCNC: 101 MMOL/L (ref 98–107)
CO2: 27 MMOL/L (ref 22–29)
CREAT SERPL-MCNC: 1.1 MG/DL (ref 0.7–1.2)
EOSINOPHILS ABSOLUTE: 0.07 E9/L (ref 0.05–0.5)
EOSINOPHILS RELATIVE PERCENT: 0.6 % (ref 0–6)
GFR AFRICAN AMERICAN: >60
GFR NON-AFRICAN AMERICAN: >60 ML/MIN/1.73
GLUCOSE BLD-MCNC: 124 MG/DL (ref 74–99)
HCT VFR BLD CALC: 46.8 % (ref 37–54)
HEMOGLOBIN: 15.2 G/DL (ref 12.5–16.5)
IMMATURE GRANULOCYTES #: 0.04 E9/L
IMMATURE GRANULOCYTES %: 0.4 % (ref 0–5)
LIPASE: 12 U/L (ref 13–60)
LYMPHOCYTES ABSOLUTE: 1.09 E9/L (ref 1.5–4)
LYMPHOCYTES RELATIVE PERCENT: 9.9 % (ref 20–42)
MCH RBC QN AUTO: 30.3 PG (ref 26–35)
MCHC RBC AUTO-ENTMCNC: 32.5 % (ref 32–34.5)
MCV RBC AUTO: 93.2 FL (ref 80–99.9)
MONOCYTES ABSOLUTE: 0.43 E9/L (ref 0.1–0.95)
MONOCYTES RELATIVE PERCENT: 3.9 % (ref 2–12)
NEUTROPHILS ABSOLUTE: 9.31 E9/L (ref 1.8–7.3)
NEUTROPHILS RELATIVE PERCENT: 84.7 % (ref 43–80)
PDW BLD-RTO: 14.8 FL (ref 11.5–15)
PLATELET # BLD: 273 E9/L (ref 130–450)
PMV BLD AUTO: 11.5 FL (ref 7–12)
POTASSIUM REFLEX MAGNESIUM: 4 MMOL/L (ref 3.5–5)
RBC # BLD: 5.02 E12/L (ref 3.8–5.8)
SODIUM BLD-SCNC: 139 MMOL/L (ref 132–146)
TOTAL PROTEIN: 7.9 G/DL (ref 6.4–8.3)
TROPONIN, HIGH SENSITIVITY: 8 NG/L (ref 0–11)
WBC # BLD: 11 E9/L (ref 4.5–11.5)

## 2022-09-07 PROCEDURE — 70450 CT HEAD/BRAIN W/O DYE: CPT

## 2022-09-07 PROCEDURE — 84484 ASSAY OF TROPONIN QUANT: CPT

## 2022-09-07 PROCEDURE — 6360000002 HC RX W HCPCS

## 2022-09-07 PROCEDURE — 96376 TX/PRO/DX INJ SAME DRUG ADON: CPT

## 2022-09-07 PROCEDURE — G0378 HOSPITAL OBSERVATION PER HR: HCPCS

## 2022-09-07 PROCEDURE — 93005 ELECTROCARDIOGRAM TRACING: CPT

## 2022-09-07 PROCEDURE — 85025 COMPLETE CBC W/AUTO DIFF WBC: CPT

## 2022-09-07 PROCEDURE — 80053 COMPREHEN METABOLIC PANEL: CPT

## 2022-09-07 PROCEDURE — 83690 ASSAY OF LIPASE: CPT

## 2022-09-07 PROCEDURE — 6370000000 HC RX 637 (ALT 250 FOR IP)

## 2022-09-07 PROCEDURE — 96374 THER/PROPH/DIAG INJ IV PUSH: CPT

## 2022-09-07 PROCEDURE — 99285 EMERGENCY DEPT VISIT HI MDM: CPT

## 2022-09-07 RX ORDER — MECLIZINE HCL 12.5 MG/1
25 TABLET ORAL ONCE
Status: COMPLETED | OUTPATIENT
Start: 2022-09-07 | End: 2022-09-07

## 2022-09-07 RX ORDER — ONDANSETRON 2 MG/ML
4 INJECTION INTRAMUSCULAR; INTRAVENOUS ONCE
Status: COMPLETED | OUTPATIENT
Start: 2022-09-07 | End: 2022-09-07

## 2022-09-07 RX ADMIN — MECLIZINE 25 MG: 12.5 TABLET ORAL at 22:32

## 2022-09-07 RX ADMIN — ONDANSETRON 4 MG: 2 INJECTION INTRAMUSCULAR; INTRAVENOUS at 20:22

## 2022-09-07 RX ADMIN — ONDANSETRON 4 MG: 2 INJECTION INTRAMUSCULAR; INTRAVENOUS at 21:50

## 2022-09-07 ASSESSMENT — ENCOUNTER SYMPTOMS
VOMITING: 1
CHEST TIGHTNESS: 0
SORE THROAT: 0
DIARRHEA: 0
ABDOMINAL PAIN: 0
COUGH: 0
PHOTOPHOBIA: 0
EYE DISCHARGE: 0
APNEA: 0
SHORTNESS OF BREATH: 0
BACK PAIN: 0
ABDOMINAL DISTENTION: 0
CONSTIPATION: 0
NAUSEA: 1
WHEEZING: 0

## 2022-09-07 ASSESSMENT — PAIN - FUNCTIONAL ASSESSMENT
PAIN_FUNCTIONAL_ASSESSMENT: NONE - DENIES PAIN
PAIN_FUNCTIONAL_ASSESSMENT: NONE - DENIES PAIN

## 2022-09-08 VITALS
TEMPERATURE: 97.7 F | OXYGEN SATURATION: 97 % | HEART RATE: 57 BPM | SYSTOLIC BLOOD PRESSURE: 123 MMHG | WEIGHT: 134 LBS | DIASTOLIC BLOOD PRESSURE: 67 MMHG | RESPIRATION RATE: 20 BRPM | HEIGHT: 66 IN | BODY MASS INDEX: 21.53 KG/M2

## 2022-09-08 LAB
EKG ATRIAL RATE: 51 BPM
EKG ATRIAL RATE: 61 BPM
EKG P AXIS: 29 DEGREES
EKG P AXIS: 60 DEGREES
EKG P-R INTERVAL: 116 MS
EKG P-R INTERVAL: 146 MS
EKG Q-T INTERVAL: 428 MS
EKG Q-T INTERVAL: 454 MS
EKG QRS DURATION: 86 MS
EKG QRS DURATION: 88 MS
EKG QTC CALCULATION (BAZETT): 418 MS
EKG QTC CALCULATION (BAZETT): 430 MS
EKG R AXIS: 15 DEGREES
EKG R AXIS: 28 DEGREES
EKG T AXIS: 29 DEGREES
EKG T AXIS: 33 DEGREES
EKG VENTRICULAR RATE: 51 BPM
EKG VENTRICULAR RATE: 61 BPM

## 2022-09-08 PROCEDURE — 6370000000 HC RX 637 (ALT 250 FOR IP): Performed by: INTERNAL MEDICINE

## 2022-09-08 PROCEDURE — 99219 PR INITIAL OBSERVATION CARE/DAY 50 MINUTES: CPT | Performed by: INTERNAL MEDICINE

## 2022-09-08 PROCEDURE — G0378 HOSPITAL OBSERVATION PER HR: HCPCS

## 2022-09-08 RX ORDER — GABAPENTIN 300 MG/1
300 CAPSULE ORAL 2 TIMES DAILY
Status: DISCONTINUED | OUTPATIENT
Start: 2022-09-08 | End: 2022-09-08 | Stop reason: HOSPADM

## 2022-09-08 RX ORDER — TRAMADOL HYDROCHLORIDE 50 MG/1
50 TABLET ORAL EVERY 8 HOURS PRN
Status: DISCONTINUED | OUTPATIENT
Start: 2022-09-08 | End: 2022-09-08 | Stop reason: HOSPADM

## 2022-09-08 RX ORDER — ATORVASTATIN CALCIUM 40 MG/1
40 TABLET, FILM COATED ORAL DAILY
Status: DISCONTINUED | OUTPATIENT
Start: 2022-09-08 | End: 2022-09-08 | Stop reason: HOSPADM

## 2022-09-08 RX ORDER — LOSARTAN POTASSIUM 25 MG/1
25 TABLET ORAL DAILY
Status: DISCONTINUED | OUTPATIENT
Start: 2022-09-08 | End: 2022-09-08 | Stop reason: HOSPADM

## 2022-09-08 RX ORDER — PANTOPRAZOLE SODIUM 40 MG/1
40 TABLET, DELAYED RELEASE ORAL
Status: DISCONTINUED | OUTPATIENT
Start: 2022-09-08 | End: 2022-09-08 | Stop reason: HOSPADM

## 2022-09-08 RX ORDER — TRAMADOL HYDROCHLORIDE 50 MG/1
50 TABLET ORAL EVERY 8 HOURS
Status: DISCONTINUED | OUTPATIENT
Start: 2022-09-08 | End: 2022-09-08

## 2022-09-08 RX ORDER — MECLIZINE HYDROCHLORIDE 25 MG/1
25 TABLET ORAL 3 TIMES DAILY PRN
Qty: 15 TABLET | Refills: 0 | Status: SHIPPED | OUTPATIENT
Start: 2022-09-08 | End: 2022-09-18

## 2022-09-08 RX ORDER — CLOPIDOGREL BISULFATE 75 MG/1
75 TABLET ORAL DAILY
Status: DISCONTINUED | OUTPATIENT
Start: 2022-09-08 | End: 2022-09-08 | Stop reason: HOSPADM

## 2022-09-08 RX ADMIN — LOSARTAN POTASSIUM 25 MG: 25 TABLET, FILM COATED ORAL at 09:50

## 2022-09-08 RX ADMIN — METOPROLOL TARTRATE 12.5 MG: 25 TABLET, FILM COATED ORAL at 09:49

## 2022-09-08 RX ADMIN — PANTOPRAZOLE SODIUM 40 MG: 40 TABLET, DELAYED RELEASE ORAL at 09:49

## 2022-09-08 RX ADMIN — CLOPIDOGREL BISULFATE 75 MG: 75 TABLET ORAL at 09:49

## 2022-09-08 RX ADMIN — GABAPENTIN 300 MG: 300 CAPSULE ORAL at 09:49

## 2022-09-08 NOTE — H&P
Moves all extremities, normal DTR    Labs:    CBC with Differential:    Lab Results   Component Value Date/Time    WBC 11.0 09/07/2022 07:58 PM    RBC 5.02 09/07/2022 07:58 PM    HGB 15.2 09/07/2022 07:58 PM    HCT 46.8 09/07/2022 07:58 PM     09/07/2022 07:58 PM    MCV 93.2 09/07/2022 07:58 PM    MCH 30.3 09/07/2022 07:58 PM    MCHC 32.5 09/07/2022 07:58 PM    RDW 14.8 09/07/2022 07:58 PM    SEGSPCT 88 03/18/2011 05:10 AM    LYMPHOPCT 9.9 09/07/2022 07:58 PM    MONOPCT 3.9 09/07/2022 07:58 PM    BASOPCT 0.5 09/07/2022 07:58 PM    MONOSABS 0.43 09/07/2022 07:58 PM    LYMPHSABS 1.09 09/07/2022 07:58 PM    EOSABS 0.07 09/07/2022 07:58 PM    BASOSABS 0.05 09/07/2022 07:58 PM     CMP:    Lab Results   Component Value Date/Time     09/07/2022 07:58 PM    K 4.0 09/07/2022 07:58 PM     09/07/2022 07:58 PM    CO2 27 09/07/2022 07:58 PM    BUN 12 09/07/2022 07:58 PM    CREATININE 1.1 09/07/2022 07:58 PM    GFRAA >60 09/07/2022 07:58 PM    LABGLOM >60 09/07/2022 07:58 PM    GLUCOSE 124 09/07/2022 07:58 PM    GLUCOSE 109 03/21/2011 03:52 AM    PROT 7.9 09/07/2022 07:58 PM    LABALBU 4.3 09/07/2022 07:58 PM    LABALBU 3.9 03/17/2011 01:22 AM    CALCIUM 9.8 09/07/2022 07:58 PM    BILITOT 1.1 09/07/2022 07:58 PM    ALKPHOS 170 09/07/2022 07:58 PM    AST 27 09/07/2022 07:58 PM    ALT 23 09/07/2022 07:58 PM      Imaging:Ct scan head :No acute intracranial abnormality.       Assessment and Plan:    Patient Active Problem List   Diagnosis    Vertigo resolved   HTN was uncontrolled normal now   DM  CABG by Hx   Hyperlipidemia

## 2022-09-08 NOTE — ED PROVIDER NOTES
HPI     Patient is a 76 y.o. male presents with a chief complaint of hypertension, nausea, vomiting, shaking. Patient states that prior to arrival by EMS his blood pressure was 207/78. He has had multiple episodes of nonbloody emesis. He states that he also feels dizzy. He had open heart surgery for 5 years ago. Patient states that he thinks the shaking might be related to stressful events in his life because this 1 started a year ago. This has been occurring for an hour. Patient states that it gets better with nothing. Patient states that it gets worse with nothing. Patient states that it is severe in severity. Patient states it was acute in onset. Review of Systems   Constitutional:  Negative for activity change, appetite change, chills, fatigue and fever. HENT:  Negative for congestion and sore throat. Eyes:  Negative for photophobia, discharge and visual disturbance. Respiratory:  Negative for apnea, cough, chest tightness, shortness of breath and wheezing. Cardiovascular:  Negative for chest pain. Gastrointestinal:  Positive for nausea and vomiting. Negative for abdominal distention, abdominal pain, constipation and diarrhea. Endocrine: Negative for polyuria. Genitourinary:  Negative for decreased urine volume, dysuria and urgency. Musculoskeletal:  Negative for back pain. Skin:  Negative for rash. Neurological:  Positive for dizziness and headaches. Negative for weakness, light-headedness and numbness. Physical Exam  Vitals and nursing note reviewed. Constitutional:       General: He is not in acute distress. Appearance: Normal appearance. He is not ill-appearing, toxic-appearing or diaphoretic. HENT:      Head: Normocephalic and atraumatic.       Right Ear: External ear normal.      Left Ear: External ear normal.      Nose: Nose normal.      Mouth/Throat:      Mouth: Mucous membranes are moist.      Pharynx: No oropharyngeal exudate or posterior oropharyngeal erythema. Eyes:      General: No scleral icterus. Right eye: No discharge. Left eye: No discharge. Cardiovascular:      Rate and Rhythm: Normal rate and regular rhythm. Heart sounds: No murmur heard. No friction rub. No gallop. Pulmonary:      Effort: Pulmonary effort is normal. No respiratory distress. Breath sounds: No stridor. No wheezing, rhonchi or rales. Abdominal:      General: Abdomen is flat. There is no distension. Tenderness: There is no abdominal tenderness. Musculoskeletal:         General: No swelling, tenderness or deformity. Skin:     General: Skin is warm. Capillary Refill: Capillary refill takes less than 2 seconds. Coloration: Skin is not jaundiced. Neurological:      General: No focal deficit present. Mental Status: He is alert. Psychiatric:         Mood and Affect: Mood normal.      EKG: This EKG is signed and interpreted by me. Rate: 61 BPM  Rhythm: Sinus  Interpretation: no acute changes  Comparison: was normal and improved from previous ekg     Procedures     MDM  Patient was given a head CT to ensure there is no intracranial abnormality causing his hypotension. Patient is borderline bradycardic raising suspicion for intracranial pathology. Noncontrast CT of the head came back as negative. Patient's blood pressure had also normalized. 4 mg IV Zofran was given for the nausea which patient stated improvement. Patient was still complaining of nausea and dizziness so 4 more milligrams of Zofran was given as well as 25 mcg of meclizine. Patient stated that he was still dizzy and nauseous so admission for observation was determined to be the safest option. Patient is a 76 y.o. male presenting with hypertension. Patient was given return precautions. Labs were interpreted by me. Patient will follow up with their primary care provider. Patient is agreeable to this plan.  Patient has remained stable throughout their stay in the ED. Patient was seen and evaluated by myself and my attending Hemalathasibeto Buckley DO. Assessment and Plan discussed with attending provider, please see attestation for final plan of care. This note was done using dictation software and there may be some grammatical errors associated with this. Edenilson Tyler DO         --------------------------------------------- PAST HISTORY ---------------------------------------------  Past Medical History:  has a past medical history of Anxiety, Asymptomatic bilateral carotid artery stenosis, Bowel obstruction ([facility] [address] CAD (coronary artery disease), COPD (chronic obstructive pulmonary disease) ([facility] [address] Cryptogenic stroke ([facility] [address] Diabetes mellitus ([facility] [address] Hyperlipidemia, Hypertension, and NSTEMI (non-ST elevated myocardial infarction) ([facility] [address] Past Surgical History:  has a past surgical history that includes colectomy; Cardiac catheterization (4/26/2016); Coronary artery bypass graft (4/27/16); Diagnostic Cardiac Cath Lab Procedure (04/26/2016); Insertable Cardiac Monitor (Left, 10/11/2016); back surgery; Insertable Cardiac Monitor (03/19/2021); Ankle fracture surgery (Left, 2020); and Lumbar spine surgery (N/A, 3/30/2022). Social History:  reports that he quit smoking about 6 years ago. His smoking use included cigarettes. He started smoking about 62 years ago. He has a 56.00 pack-year smoking history. He has never used smokeless tobacco. He reports that he does not drink alcohol and does not use drugs. Family History: family history includes Heart Attack in his father; Heart Surgery in his brother and mother; Hypertension in his mother. The patients home medications have been reviewed. Allergies: Patient has no known allergies.     -------------------------------------------------- RESULTS -------------------------------------------------    LABS:  Results for orders placed or performed during the hospital encounter of 09/07/22   CBC with Auto Differential   Result Value Ref Range    WBC 11.0 4.5 - 11.5 E9/L    RBC 5.02 3.80 - 5.80 E12/L    Hemoglobin 15.2 12.5 - 16.5 g/dL    Hematocrit 46.8 37.0 - 54.0 %    MCV 93.2 80.0 - 99.9 fL    MCH 30.3 26.0 - 35.0 pg    MCHC 32.5 32.0 - 34.5 %    RDW 14.8 11.5 - 15.0 fL    Platelets 464 051 - 900 E9/L    MPV 11.5 7.0 - 12.0 fL    Neutrophils % 84.7 (H) 43.0 - 80.0 %    Immature Granulocytes % 0.4 0.0 - 5.0 %    Lymphocytes % 9.9 (L) 20.0 - 42.0 %    Monocytes % 3.9 2.0 - 12.0 %    Eosinophils % 0.6 0.0 - 6.0 %    Basophils % 0.5 0.0 - 2.0 %    Neutrophils Absolute 9.31 (H) 1.80 - 7.30 E9/L    Immature Granulocytes # 0.04 E9/L    Lymphocytes Absolute 1.09 (L) 1.50 - 4.00 E9/L    Monocytes Absolute 0.43 0.10 - 0.95 E9/L    Eosinophils Absolute 0.07 0.05 - 0.50 E9/L    Basophils Absolute 0.05 0.00 - 0.20 E9/L   Comprehensive Metabolic Panel w/ Reflex to MG   Result Value Ref Range    Sodium 139 132 - 146 mmol/L    Potassium reflex Magnesium 4.0 3.5 - 5.0 mmol/L    Chloride 101 98 - 107 mmol/L    CO2 27 22 - 29 mmol/L    Anion Gap 11 7 - 16 mmol/L    Glucose 124 (H) 74 - 99 mg/dL    BUN 12 6 - 23 mg/dL    Creatinine 1.1 0.7 - 1.2 mg/dL    GFR Non-African American >60 >=60 mL/min/1.73    GFR African American >60     Calcium 9.8 8.6 - 10.2 mg/dL    Total Protein 7.9 6.4 - 8.3 g/dL    Albumin 4.3 3.5 - 5.2 g/dL    Total Bilirubin 1.1 0.0 - 1.2 mg/dL    Alkaline Phosphatase 170 (H) 40 - 129 U/L    ALT 23 0 - 40 U/L    AST 27 0 - 39 U/L   Lipase   Result Value Ref Range    Lipase 12 (L) 13 - 60 U/L   Troponin   Result Value Ref Range    Troponin, High Sensitivity 8 0 - 11 ng/L       RADIOLOGY:  CT HEAD WO CONTRAST   Final Result   No acute intracranial abnormality.                 ------------------------- NURSING NOTES AND VITALS REVIEWED ---------------------------  Date / Time Roomed:  9/7/2022  7:41 PM  ED Bed Assignment:  [id number]/[id number]    The nursing notes within the ED encounter and vital signs as below have been reviewed. Patient Vitals for the past 24 hrs:   BP Temp Temp src Pulse Resp SpO2 Height Weight   09/08/22 0100 (!) 152/68 98 °F (36.7 °C) Oral 50 20 99 % -- --   09/07/22 2332 (!) 165/69 97.5 °F (36.4 °C) Oral 50 20 96 % 5' 6\" (1.676 m) 135 lb (61.2 kg)   09/07/22 2319 (!) 154/55 -- -- 50 -- -- -- --   09/07/22 2251 (!) 152/72 -- -- 54 -- 96 % -- --   09/07/22 2150 (!) 148/65 -- -- 50 16 96 % -- --   09/07/22 2015 (!) 180/90 -- -- 57 -- 96 % -- --   09/07/22 1948 (!) 207/78 98.3 °F (36.8 °C) -- 63 16 99 % 5' (1.524 m) 130 lb (59 kg)       Oxygen Saturation Interpretation: Normal    ------------------------------------------ PROGRESS NOTES ------------------------------------------  Re-evaluation(s):   Patients symptoms are improving  Repeat physical examination is improved    Counseling:  I have spoken with the patient and discussed todays results, in addition to providing specific details for the plan of care and counseling regarding the diagnosis and prognosis. Their questions are answered at this time and they are agreeable with the plan of admission.    --------------------------------- ADDITIONAL PROVIDER NOTES ---------------------------------  Consultations:  Spoke with Dr. Patti Elias. Discussed case. They will admit the patient. This patient's ED course included: a personal history and physicial examination, re-evaluation prior to disposition, multiple bedside re-evaluations, IV medications, cardiac monitoring, and continuous pulse oximetry    This patient has remained hemodynamically stable during their ED course. Diagnosis:  1. Dizziness    2.  Hypertension, unspecified type        Disposition:  Patient's disposition: Admit to telemetry  Patient's condition is Stable        Yanely Parsons,   Resident  09/08/22 [address] [facility],[address], DO  Resident  09/08/22 0887

## 2022-09-08 NOTE — PROGRESS NOTES
Georgia Thompson was ordered empagliflozin (Dorise Kras) which is a nonformulary medication. This medication will need to be supplied by the patient as the pharmacy does not carry this non-formulary medication. If the medication has not been administered by 1400 on the following day from the time the order was placed, a pharmacist will follow-up with the nurse of the patient to assess the capability of the patient to bring in the medication. If it is determined that the patient cannot supply the medication and it is not available to be dispensed from the pharmacy, the provider will be notified.   Darylene Single, [facility]  9/8/2022  4:43 AM

## 2022-09-08 NOTE — PLAN OF CARE
Problem: Discharge Planning  Goal: Discharge to home or other facility with appropriate resources  Outcome: Progressing  Flowsheets (Taken 9/8/2022 0138)  Discharge to home or other facility with appropriate resources:   Identify barriers to discharge with patient and caregiver   Identify discharge learning needs (meds, wound care, etc)   Refer to discharge planning if patient needs post-hospital services based on physician order or complex needs related to functional status, cognitive ability or social support system     Problem: ABCDS Injury Assessment  Goal: Absence of physical injury  Outcome: Progressing     Problem: Safety - Adult  Goal: Free from fall injury  Outcome: Progressing

## 2022-11-14 RX ORDER — CLOPIDOGREL BISULFATE 75 MG/1
TABLET ORAL
Qty: 90 TABLET | Refills: 0 | Status: SHIPPED | OUTPATIENT
Start: 2022-11-14

## 2022-11-23 ENCOUNTER — TELEPHONE (OUTPATIENT)
Dept: CARDIOLOGY CLINIC | Age: 77
End: 2022-11-23

## 2022-11-23 NOTE — TELEPHONE ENCOUNTER
Patient needs cardiac clearance for circumcision under general anesthesia not yet scheduled) at [facility] by Dr. Clain Woods. Patient denies any chest pain, shortness of breath or palpitations since last visit in November 2021. Patient is able to complete all activities of daily living, including; climbing one flight of stairs and walking one block without cardiac symptoms. Recommended to hold Plavix (7) days prior. Please advise. FYI  There is no appointment availability through the end of the year.

## 2022-11-25 LAB
ORGANISM: ABNORMAL
URINE CULTURE, ROUTINE: ABNORMAL

## 2022-11-29 RX ORDER — LOSARTAN POTASSIUM 25 MG/1
TABLET ORAL
Qty: 30 TABLET | Refills: 0 | Status: SHIPPED | OUTPATIENT
Start: 2022-11-29

## 2022-12-19 ENCOUNTER — OFFICE VISIT (OUTPATIENT)
Dept: CARDIOLOGY CLINIC | Age: 77
End: 2022-12-19
Payer: MEDICARE

## 2022-12-19 ENCOUNTER — TELEPHONE (OUTPATIENT)
Dept: CARDIOLOGY CLINIC | Age: 77
End: 2022-12-19

## 2022-12-19 VITALS
BODY MASS INDEX: 22.18 KG/M2 | HEART RATE: 48 BPM | RESPIRATION RATE: 12 BRPM | DIASTOLIC BLOOD PRESSURE: 68 MMHG | SYSTOLIC BLOOD PRESSURE: 140 MMHG | WEIGHT: 138 LBS | HEIGHT: 66 IN

## 2022-12-19 DIAGNOSIS — I25.10 CORONARY ARTERY DISEASE INVOLVING NATIVE CORONARY ARTERY OF NATIVE HEART WITHOUT ANGINA PECTORIS: Primary | ICD-10-CM

## 2022-12-19 DIAGNOSIS — Z01.810 PREOP CARDIOVASCULAR EXAM: ICD-10-CM

## 2022-12-19 DIAGNOSIS — R06.09 DOE (DYSPNEA ON EXERTION): ICD-10-CM

## 2022-12-19 DIAGNOSIS — I10 PRIMARY HYPERTENSION: ICD-10-CM

## 2022-12-19 DIAGNOSIS — R07.89 OTHER CHEST PAIN: ICD-10-CM

## 2022-12-19 PROCEDURE — G8420 CALC BMI NORM PARAMETERS: HCPCS | Performed by: INTERNAL MEDICINE

## 2022-12-19 PROCEDURE — 1036F TOBACCO NON-USER: CPT | Performed by: INTERNAL MEDICINE

## 2022-12-19 PROCEDURE — 1123F ACP DISCUSS/DSCN MKR DOCD: CPT | Performed by: INTERNAL MEDICINE

## 2022-12-19 PROCEDURE — G8428 CUR MEDS NOT DOCUMENT: HCPCS | Performed by: INTERNAL MEDICINE

## 2022-12-19 PROCEDURE — 93000 ELECTROCARDIOGRAM COMPLETE: CPT | Performed by: INTERNAL MEDICINE

## 2022-12-19 PROCEDURE — G8484 FLU IMMUNIZE NO ADMIN: HCPCS | Performed by: INTERNAL MEDICINE

## 2022-12-19 PROCEDURE — 99214 OFFICE O/P EST MOD 30 MIN: CPT | Performed by: INTERNAL MEDICINE

## 2022-12-19 PROCEDURE — 3074F SYST BP LT 130 MM HG: CPT | Performed by: INTERNAL MEDICINE

## 2022-12-19 PROCEDURE — 3078F DIAST BP <80 MM HG: CPT | Performed by: INTERNAL MEDICINE

## 2022-12-19 RX ORDER — METOPROLOL SUCCINATE 25 MG
12.5 TABLET, EXTENDED RELEASE 24 HR ORAL DAILY
Qty: 30 TABLET | Refills: 3
Start: 2022-12-19 | End: 2022-12-20 | Stop reason: SDUPTHER

## 2022-12-19 RX ORDER — LOSARTAN POTASSIUM 50 MG/1
50 TABLET ORAL DAILY
Qty: 90 TABLET | Refills: 3 | Status: SHIPPED | OUTPATIENT
Start: 2022-12-19

## 2022-12-19 RX ORDER — TRAMADOL HYDROCHLORIDE 50 MG/1
50 TABLET ORAL EVERY 6 HOURS PRN
COMMUNITY

## 2022-12-19 NOTE — TELEPHONE ENCOUNTER
Patient notified of Dr. Alex Mcdonald recommendations. Med list amended. Toprol and Losartan e-scribed.

## 2022-12-19 NOTE — PROGRESS NOTES
Chyna Carmen  1945  Date of Service: 2022    Patient Active Problem List    Diagnosis Date Noted    Dizziness 2022     Priority: Medium    Lumbar stenosis with neurogenic claudication 2022    Lumbar disc herniation with radiculopathy 2021    Type 2 diabetes mellitus without complication, without long-term current use of insulin ([facility] [address] 2021    Primary insomnia 2020    Carotid atherosclerosis, bilateral 2020    Dupuytren's disease of palm of left hand 2020    Benign prostatic hyperplasia without lower urinary tract symptoms 2019    Lumbar disc disease 2017    Status post placement of implantable loop recorder 10/25/2016    PFO (patent foramen ovale) 10/11/2016    Hx of CABG 10/10/2016    Essential hypertension 10/10/2016    Mixed hyperlipidemia 10/10/2016    Carotid artery stenosis, asymptomatic, bilateral 2016    Coronary artery disease involving native coronary artery of native heart without angina pectoris 2016     Overview Note:     4-16 LM stenosis  CABG  LIMA - LAD  SVG - OM  SVG - Ramus         Social History     Socioeconomic History    Marital status:      Spouse name: None    Number of children: None    Years of education: None    Highest education level: None   Occupational History    Occupation: - retired   Tobacco Use    Smoking status: Former     Packs/day: 1.00     Years: 56.00     Pack years: 56.00     Types: Cigarettes     Start date:      Quit date: 2016     Years since quittin.6    Smokeless tobacco: Never   Vaping Use    Vaping Use: Never used    Passive vaping exposure: Yes   Substance and Sexual Activity    Alcohol use: No    Drug use: No    Sexual activity: Not Currently     Partners: Female     Social Determinants of Health     Financial Resource Strain: Low Risk     Difficulty of Paying Living Expenses: Not very hard   Food Insecurity: No Food Insecurity    Worried About Running Out of Food in the Last Year: Never true    Ran Out of Food in the Last Year: Never true              No Known Allergies    Chief Complaint:  Jimmy Padron is here today for follow up and management/recomendations for CAD     History of Present Illness: Jimmy Padron states that He does house work, goes up the stairs, does yard work, and goes shopping. He states that he had left-sided aching chest discomfort for approximately 20 minutes this morning. It was not associated with any physical activities. He is currently pain-free. However, he also states that he has been having dyspnea on exertion over the past 2 to 3 months. He denies orthopnea/PND, or lower extremity edema. He denies any palpitations or presyncopal symptoms. He denies any orthopnea/PND. He denies any palpitations or presyncopal symptoms. REVIEW OF SYSTEMS:  As above. Patient does not complain of any fever, chills, nausea, vomiting or diarrhea. No focal, motor or neurological deficits. No changes in his/her vision, hearing, bowel or bladder habits. He is not known to have a history of thyroid problems. No recent nose bleeds. PHYSICAL EXAM:  Vitals:    12/19/22 1259   BP: (!) 140/68   Pulse: (!) 48   Resp: 12   Weight: 138 lb (62.6 kg)   Height: 5' 6\" (1.676 m)       GENERAL:  He is alert and oriented x 3, communicates well, in no distress. NECK:  No masses, trachea is mid position. Supple, full ROM, no JVD. Difficult to hear about no bruits. No palpable thyromegaly or lymphadenopathy. HEART: Distant. Regular bradycardic rhythm. Normal S1 and S2. There is an S4 gallop and no abnormal murmurs on today's exam.  LUNGS: Decreased air movement. Clear to auscultation bilaterally. No use of accessory muscles. symmetrical excursion. ABDOMEN:  Soft, non-tender. Normal bowel sounds. EXTREMITIES:  No bilateral lower extremity edema. Good distal pulses. Full range of motion x4. EYES:  Extraocular muscles intact. PERRL. Normal lids & conjunctiva. ENT:  Nares are clear & not bleeding. Moist mucosa. Normal lips formation. No external masses   NEURO: no tremors, full ROM x 4, EOMI. SKIN:  Warm, dry and intact. Normal turgor. EKG: Sinus bradycardia, 48 bpm, nl axis, nonspecific ST - T wave changes. Assessment:   Coronary artery disease as outlined above. Atypical chest pain  Dyspnea on exertion  Bradycardia  PFO  TIA  Carotid stenosis  Hypertension, not well controlled today  Hypercholesterolemia        Recommendations:  He is following the cholesterol with Dr. Kaylynn Zhao. Change Lopressor 12.5 twice daily to Toprol 12.5 once a day  Increase losartan from 25 mg to 50 mg daily  BMP in 1 week  Lexiscan Cardiolite stress test.  Preop cardiac risk stratification based on the stress test results. Thank you for allowing me to participate in your patient's care.       [id number], [address] [facility]  Interventional Cardiology

## 2022-12-19 NOTE — TELEPHONE ENCOUNTER
Per verbal from Dr. Luan Salinas, call patient with medication changes:    1)  Change Lopressor to Toprol 12.5 mg daily  2)  Increase Losartan to 50 mg daily  3)  BMP in (1) week

## 2022-12-20 ENCOUNTER — TELEPHONE (OUTPATIENT)
Dept: NON INVASIVE DIAGNOSTICS | Age: 77
End: 2022-12-20

## 2022-12-20 RX ORDER — METOPROLOL SUCCINATE 25 MG/1
12.5 TABLET, EXTENDED RELEASE ORAL DAILY
Qty: 45 TABLET | Refills: 3 | Status: SHIPPED | OUTPATIENT
Start: 2022-12-20

## 2022-12-20 NOTE — TELEPHONE ENCOUNTER
MA spoke with Annabel Stubbs with [facility], authorization number: [id number] for CPT code 88189 Approval granted from 12/20/2022 - 02/03/2023.     Electronically signed by Bhavna Ramos MA on 12/20/2022 at 3:18 PM

## 2022-12-21 ENCOUNTER — TELEPHONE (OUTPATIENT)
Dept: CARDIOLOGY | Age: 77
End: 2022-12-21

## 2022-12-21 NOTE — TELEPHONE ENCOUNTER
Spoke w/ pt to confirm stress for Friday 12/23/22 at 0800. Instructions given and medications reviewed. Pt instructed to hold all DM medications the morning of test.  Pt very rushed to get off phone and hung up towards the end of instructions before I could verify he understood everything.

## 2022-12-23 ENCOUNTER — HOSPITAL ENCOUNTER (OUTPATIENT)
Dept: CARDIOLOGY | Age: 77
Discharge: HOME | End: 2022-12-23

## 2022-12-30 RX ORDER — LOSARTAN POTASSIUM 25 MG/1
TABLET ORAL
Qty: 30 TABLET | Refills: 0 | OUTPATIENT
Start: 2022-12-30

## 2023-01-03 ENCOUNTER — TELEPHONE (OUTPATIENT)
Dept: CARDIOLOGY | Age: 78
End: 2023-01-03

## 2023-01-03 NOTE — TELEPHONE ENCOUNTER
Spoke with patient and confirmed Pharmacological  stress test appointment on 1/5/2023 at 0700. Instructions for test,given to hold jardiance the morning of the test and check his blood sugar in the am, and preprocedure information reviewed with patient, questions answered. Patient verbalized understanding.

## 2023-01-05 ENCOUNTER — HOSPITAL ENCOUNTER (OUTPATIENT)
Dept: CARDIOLOGY | Age: 78
Discharge: HOME | End: 2023-01-05
Payer: MEDICARE

## 2023-01-05 ENCOUNTER — TELEPHONE (OUTPATIENT)
Dept: CARDIOLOGY CLINIC | Age: 78
End: 2023-01-05

## 2023-01-05 VITALS
HEART RATE: 50 BPM | HEIGHT: 66 IN | WEIGHT: 138 LBS | RESPIRATION RATE: 16 BRPM | SYSTOLIC BLOOD PRESSURE: 132 MMHG | BODY MASS INDEX: 22.18 KG/M2 | DIASTOLIC BLOOD PRESSURE: 74 MMHG

## 2023-01-05 DIAGNOSIS — Z01.810 PREOP CARDIOVASCULAR EXAM: ICD-10-CM

## 2023-01-05 DIAGNOSIS — R06.09 DOE (DYSPNEA ON EXERTION): ICD-10-CM

## 2023-01-05 DIAGNOSIS — R06.09 DOE (DYSPNEA ON EXERTION): Primary | ICD-10-CM

## 2023-01-05 DIAGNOSIS — I25.10 CORONARY ARTERY DISEASE INVOLVING NATIVE CORONARY ARTERY OF NATIVE HEART WITHOUT ANGINA PECTORIS: ICD-10-CM

## 2023-01-05 DIAGNOSIS — R07.89 OTHER CHEST PAIN: ICD-10-CM

## 2023-01-05 PROCEDURE — 93017 CV STRESS TEST TRACING ONLY: CPT

## 2023-01-05 PROCEDURE — 3430000000 HC RX DIAGNOSTIC RADIOPHARMACEUTICAL: Performed by: INTERNAL MEDICINE

## 2023-01-05 PROCEDURE — 78452 HT MUSCLE IMAGE SPECT MULT: CPT

## 2023-01-05 PROCEDURE — 6360000002 HC RX W HCPCS: Performed by: INTERNAL MEDICINE

## 2023-01-05 PROCEDURE — A9500 TC99M SESTAMIBI: HCPCS | Performed by: INTERNAL MEDICINE

## 2023-01-05 PROCEDURE — 2580000003 HC RX 258: Performed by: INTERNAL MEDICINE

## 2023-01-05 RX ORDER — TECHNETIUM TC-99M SESTAMIBI 1 MG/10ML
33.4 INJECTION INTRAVENOUS
Status: COMPLETED | OUTPATIENT
Start: 2023-01-05 | End: 2023-01-05

## 2023-01-05 RX ORDER — SODIUM CHLORIDE 0.9 % (FLUSH) 0.9 %
10 SYRINGE (ML) INJECTION PRN
Status: DISCONTINUED | OUTPATIENT
Start: 2023-01-05 | End: 2023-01-06 | Stop reason: HOSPADM

## 2023-01-05 RX ORDER — TECHNETIUM TC-99M SESTAMIBI 1 MG/10ML
10.6 INJECTION INTRAVENOUS
Status: COMPLETED | OUTPATIENT
Start: 2023-01-05 | End: 2023-01-05

## 2023-01-05 RX ADMIN — REGADENOSON 0.4 MG: 0.08 INJECTION, SOLUTION INTRAVENOUS at 08:24

## 2023-01-05 RX ADMIN — SODIUM CHLORIDE, PRESERVATIVE FREE 10 ML: 5 INJECTION INTRAVENOUS at 08:25

## 2023-01-05 RX ADMIN — SODIUM CHLORIDE, PRESERVATIVE FREE 10 ML: 5 INJECTION INTRAVENOUS at 08:24

## 2023-01-05 RX ADMIN — SODIUM CHLORIDE, PRESERVATIVE FREE 10 ML: 5 INJECTION INTRAVENOUS at 07:08

## 2023-01-05 RX ADMIN — Medication 33.4 MILLICURIE: at 08:25

## 2023-01-05 RX ADMIN — Medication 10.6 MILLICURIE: at 07:07

## 2023-01-05 NOTE — PROCEDURES
[address] [facility] [address] [phone]                Pharmacologic Stress Nuclear Gated SPECT Study    Name: [facility] Account Number: [de-identified]    :  1945          Sex: male         Date of Study:  2023    Height: 5' 6\" (167.6 cm)         Weight: 138 lb (62.6 kg)     Ordering Provider: Vlad Castaneda DO          PCP: Raj Mahan DO      Cardiologist: Vlad Castaneda DO             Interpreting Physician: Benigno Interiano MD  _________________________________________________________________________________    Indication:   Preoperative Risk Stratification    Clinical History:   Patient has prior history of coronary artery disease. Resting ECG:    SD int .18 sec, QRS int .08 sec, QT int . 40 sec; HR 50 bpm  Normal sinus rhythm and Nonspecific ST-T wave changes    Procedure:   Pharmacologic stress testing was performed with regadenoson 0.4 mg for 15 seconds. Additionally, low-level exercise was performed along with the infusion. The heart rate was 50 at baseline and jarrett to 103 beats during the infusion. This corresponds to 73% of maximum predicted heart rate. The blood pressure at baseline was 132/74 and blood pressure at the end of infusion was 170/70. Blood pressure response was normal during the stress procedure. The patient tolerated the infusion well. ECG during the infusion did not change. IMAGING: Myocardial perfusion imaging was performed at rest 30-35 minutes following the intravenous injection of 10.6 mCi of (Tc-Sestamibi) followed by 10 ml of Normal Saline. As per infusion protocol, the patient was injected intravenously with 33.4 mCi of (Tc-Sestamibi) followed by 10 ml of Normal Saline. Gated post-stress tomographic imaging was performed 20-25 minutes after stress. FINDINGS: The overall quality of the study was fair.      Left ventricular cavity size was noted to be normal.    Rotational analog analysis demonstrated no patient motion or abnormal extracardiac radioactivity. A mild defect was present in the mid inferior wall(s) that was  small sized by quantification. The resting images show no change. Gated SPECT left ventricular ejection fraction was calculated to be 63%, with normal myocardial thickening and wall motion. Impression:    ECG during the infusion did not change. The myocardial perfusion imaging was normal with attenuation artifact. Overall left ventricular systolic function was normal without regional wall motion abnormalities. Low risk pre-operative pharmacologic stress test.    Thank you for sending your patient to this [employer].      Electronically signed by Mario Oviedo MD on 1/5/23 at 12:45 PM EST

## 2023-01-05 NOTE — TELEPHONE ENCOUNTER
----- Message from Freddy Anderson DO sent at 1/5/2023  1:58 PM EST -----  Let him know that the stress test was good. He is low risk for perioperative ischemic cardiac events. I recommend not missing any doses of the aspirin perioperatively with his history of coronary artery disease unless absolutely contraindicated for the procedure. Faby Ortega

## 2023-01-05 NOTE — TELEPHONE ENCOUNTER
Patient notified of stress test results and Dr. Willie Quintana risk assessment/recommendation for surgery. Note faxed to [facility] [phone].

## 2023-01-05 NOTE — DISCHARGE INSTRUCTIONS
[address] [facility]      Instructions to Patients    The following are the instructions for patients who have had a procedure in our office today. Patient name: Danie Mike    Radionuclide Activity: 40mCi of 99mTc-Sestamibi    Date Administered: 1/5/2023    Expires: 48 hours after scheduled appointment time      Patient may resume normal activity unless otherwise instructed. Patient may resume medications as normal.  If the need should arise, patient may call [phone] between the hours of 8:00am-5:00pm.  After hours there is at least one physician on-call at all times for those patients needing assistance. Patients may call [phone] and the answering service will direct the patient to one of our physicians for assistance. After the patient's test if they are going to be leaving from an airport in the near future they should take this letter with them to verify the test and radionuclide used for their test.      This letter verifies that the above named bearer received an injection of a radionuclide for medical purpose/usage only.         Electronically signed by Kita Cunha on 1/5/2023 at 8:17 AM

## 2023-01-25 ENCOUNTER — PREP FOR PROCEDURE (OUTPATIENT)
Dept: UROLOGY | Age: 78
End: 2023-01-25

## 2023-01-25 RX ORDER — SODIUM CHLORIDE 9 MG/ML
INJECTION, SOLUTION INTRAVENOUS CONTINUOUS
Status: CANCELLED | OUTPATIENT
Start: 2023-01-25

## 2023-01-25 RX ORDER — SODIUM CHLORIDE 0.9 % (FLUSH) 0.9 %
5-40 SYRINGE (ML) INJECTION PRN
Status: CANCELLED | OUTPATIENT
Start: 2023-01-25

## 2023-01-25 RX ORDER — SODIUM CHLORIDE 9 MG/ML
INJECTION, SOLUTION INTRAVENOUS PRN
Status: CANCELLED | OUTPATIENT
Start: 2023-01-25

## 2023-01-25 RX ORDER — SODIUM CHLORIDE 0.9 % (FLUSH) 0.9 %
5-40 SYRINGE (ML) INJECTION EVERY 12 HOURS SCHEDULED
Status: CANCELLED | OUTPATIENT
Start: 2023-01-25

## 2023-02-13 RX ORDER — CLOPIDOGREL BISULFATE 75 MG/1
TABLET ORAL
Qty: 90 TABLET | Refills: 3 | Status: SHIPPED | OUTPATIENT
Start: 2023-02-13

## 2023-03-08 ENCOUNTER — APPOINTMENT (OUTPATIENT)
Dept: GENERAL RADIOLOGY | Age: 78
End: 2023-03-08
Payer: MEDICARE

## 2023-03-08 ENCOUNTER — HOSPITAL ENCOUNTER (EMERGENCY)
Age: 78
Discharge: HOME | End: 2023-03-08
Attending: EMERGENCY MEDICINE
Payer: MEDICARE

## 2023-03-08 VITALS
SYSTOLIC BLOOD PRESSURE: 166 MMHG | HEART RATE: 63 BPM | WEIGHT: 135 LBS | BODY MASS INDEX: 21.69 KG/M2 | RESPIRATION RATE: 17 BRPM | DIASTOLIC BLOOD PRESSURE: 72 MMHG | HEIGHT: 66 IN | OXYGEN SATURATION: 100 % | TEMPERATURE: 97.9 F

## 2023-03-08 DIAGNOSIS — R53.83 OTHER FATIGUE: Primary | ICD-10-CM

## 2023-03-08 DIAGNOSIS — M25.559 HIP PAIN: ICD-10-CM
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
## 2023-03-08 PROCEDURE — 99283 EMERGENCY DEPT VISIT LOW MDM: CPT

## 2023-03-08 PROCEDURE — 73502 X-RAY EXAM HIP UNI 2-3 VIEWS: CPT

## 2023-03-08 ASSESSMENT — ENCOUNTER SYMPTOMS
BOWEL INCONTINENCE: 0
VISUAL CHANGE: 0
DIARRHEA: 0
EYE DISCHARGE: 0
BACK PAIN: 0
NAUSEA: 0
SORE THROAT: 0
WHEEZING: 0
VOMITING: 0
SINUS PRESSURE: 0
EYE REDNESS: 0
SHORTNESS OF BREATH: 0
COUGH: 0
ABDOMINAL PAIN: 0
EYE PAIN: 0

## 2023-03-08 ASSESSMENT — PAIN - FUNCTIONAL ASSESSMENT: PAIN_FUNCTIONAL_ASSESSMENT: NONE - DENIES PAIN

## 2023-03-08 NOTE — ED PROVIDER NOTES
Patient states he recently began taking meclizine for dizziness. He states it has been very sleepy. Yesterday, he fell landing on his right hip. Denies striking his head. No head neck or back pain. Complains of some mild right hip pain. The history is provided by the patient. Fall  The accident occurred Yesterday. The fall occurred while walking. He landed on A hard floor. The point of impact was the right hip. The pain is present in the right hip. The pain is mild. He was Ambulatory at the scene. There was No entrapment after the fall. There was No drug use involved in the accident. There was No alcohol use involved in the accident. Pertinent negatives include no visual change, no fever, no numbness, no abdominal pain, no bowel incontinence, no nausea, no vomiting, no hematuria, no headaches, no hearing loss, no loss of consciousness and no tingling. The symptoms are aggravated by ambulation. He has tried nothing for the symptoms. Review of Systems   Constitutional:  Negative for chills and fever. HENT:  Negative for sinus pressure and sore throat. Eyes:  Negative for pain, discharge and redness. Respiratory:  Negative for cough, shortness of breath and wheezing. Cardiovascular:  Negative for chest pain. Gastrointestinal:  Negative for abdominal pain, bowel incontinence, diarrhea, nausea and vomiting. Genitourinary:  Negative for dysuria, frequency and hematuria. Musculoskeletal:  Positive for arthralgias. Negative for back pain. Skin:  Negative for rash and wound. Neurological:  Negative for tingling, loss of consciousness, weakness, numbness and headaches. Hematological:  Negative for adenopathy. All other systems reviewed and are negative. Physical Exam  Vitals and nursing note reviewed. Constitutional:       Appearance: He is well-developed. HENT:      Head: Normocephalic and atraumatic. Eyes:      Pupils: Pupils are equal, round, and reactive to light. Cardiovascular:      Rate and Rhythm: Normal rate and regular rhythm. Heart sounds: Normal heart sounds. No murmur heard. Pulmonary:      Effort: Pulmonary effort is normal. No respiratory distress. Breath sounds: Normal breath sounds. No wheezing or rales. Abdominal:      General: Bowel sounds are normal.      Palpations: Abdomen is soft. Tenderness: There is no abdominal tenderness. There is no guarding or rebound. Musculoskeletal:         General: Tenderness present. No swelling. Cervical back: Normal range of motion and neck supple. Comments: Mild right hip pain with palpation to the lateral portion. Full active and passive range of motion intact. Good distal pulse and capillary refill. Skin:     General: Skin is warm and dry. Neurological:      Mental Status: He is alert and oriented to person, place, and time. Cranial Nerves: No cranial nerve deficit. Coordination: Coordination normal.        Procedures     MDM     Patient with complaints of significant sleepiness after taking meclizine yesterday. The sleepiness initiated a fall. Now with hip pain. X-ray fails to demonstrate any bony abnormality. He does feel much better today. He has not taken the meclizine today. We discussed ways to ameliorate the side effects at home. He will follow-up with his primary care physician.           --------------------------------------------- PAST HISTORY ---------------------------------------------  Past Medical History:  has a past medical history of Anxiety, Asymptomatic bilateral carotid artery stenosis, Bowel obstruction ([facility] [address] CAD (coronary artery disease), COPD (chronic obstructive pulmonary disease) ([facility] [address] [facility] Diabetes mellitus ([facility] Hyperlipidemia, Hypertension, and NSTEMI (non-ST elevated myocardial infarction) ([facility]     Past Surgical History:  has a past surgical history that includes colectomy; Cardiac catheterization (4/26/2016); Coronary artery bypass graft (4/27/16); Diagnostic Cardiac Cath Lab Procedure (04/26/2016); Insertable Cardiac Monitor (Left, 10/11/2016); back surgery; Insertable Cardiac Monitor (03/19/2021); Ankle fracture surgery (Left, 2020); and Lumbar spine surgery (N/A, 3/30/2022). Social History:  reports that he quit smoking about 6 years ago. His smoking use included cigarettes. He started smoking about 63 years ago. He has a 56.00 pack-year smoking history. He has never used smokeless tobacco. He reports that he does not drink alcohol and does not use drugs. Family History: family history includes Coronary Art Dis in his mother; Heart Attack in his father; Heart Surgery in his brother and mother; Hypertension in his mother. The patients home medications have been reviewed. Allergies: Patient has no known allergies. -------------------------------------------------- RESULTS -------------------------------------------------  Labs:  No results found for this visit on 03/08/23. Radiology:  XR HIP 2-3 VW W PELVIS RIGHT   Final Result   No acute abnormality of the pelvis and right hip.             ------------------------- NURSING NOTES AND VITALS REVIEWED ---------------------------  Date / Time Roomed:  3/8/2023 12:13 PM  ED Bed Assignment:  [email or web address]    The nursing notes within the ED encounter and vital signs as below have been reviewed. BP (!) 172/69   Pulse 55   Temp 97.9 °F (36.6 °C)   Resp 16   Ht 5' 6\" (1.676 m)   Wt 135 lb (61.2 kg)   SpO2 100%   BMI 21.79 kg/m²   Oxygen Saturation Interpretation: Normal      ------------------------------------------ PROGRESS NOTES ------------------------------------------  1:02 PM EST  I have spoken with the patient and discussed todays results, in addition to providing specific details for the plan of care and counseling regarding the diagnosis and prognosis. Their questions are answered at this time and they are agreeable with the plan.  I discussed at length with them reasons for immediate return here for re evaluation. They will followup with their primary care physician by calling their office tomorrow. --------------------------------- ADDITIONAL PROVIDER NOTES ---------------------------------  At this time the patient is without objective evidence of an acute process requiring hospitalization or inpatient management. They have remained hemodynamically stable throughout their entire ED visit and are stable for discharge with outpatient follow-up. The plan has been discussed in detail and they are aware of the specific conditions for emergent return, as well as the importance of follow-up. New Prescriptions    No medications on file       Diagnosis:  1. Other fatigue    2. Hip pain    3. Fall on same level from slipping, tripping or stumbling, initial encounter        Disposition:  Patient's disposition: Discharge to home  Patient's condition is stable.           Gil Dawson DO  03/08/23 [id number]

## 2023-08-04 ENCOUNTER — TELEPHONE (OUTPATIENT)
Dept: CARDIOLOGY CLINIC | Age: 78
End: 2023-08-04

## 2023-08-04 NOTE — TELEPHONE ENCOUNTER
Patient needs cardiac clearance for left SF partial palmar and digital fasciectomy by Dr. Ricardo Tejada on 8/30/23 at an [facility]. Unable to reach patient. Spoke with patient's daughter Suzan Borges), she will go to patient's house today to assess for any cardiac symptoms since his last visit in December 2022 and she will call me back.

## 2023-08-09 NOTE — TELEPHONE ENCOUNTER
Patient's daughter called back and advised that patient admitted to her that he has shortness of breath (unable to say whether it is at rest or with exertion). He also mentioned pain under his arm pits.

## 2023-08-10 NOTE — TELEPHONE ENCOUNTER
Patient's daughter Samy Alan) notified of Dr. Hobbs Chatham recommendation. F/U scheduled for 8/16/23 at 3:00 p.m.

## 2023-08-16 ENCOUNTER — OFFICE VISIT (OUTPATIENT)
Dept: CARDIOLOGY CLINIC | Age: 78
End: 2023-08-16
Payer: MEDICARE

## 2023-08-16 VITALS
SYSTOLIC BLOOD PRESSURE: 130 MMHG | WEIGHT: 133 LBS | HEIGHT: 66 IN | HEART RATE: 44 BPM | BODY MASS INDEX: 21.38 KG/M2 | DIASTOLIC BLOOD PRESSURE: 70 MMHG | RESPIRATION RATE: 15 BRPM

## 2023-08-16 DIAGNOSIS — I25.10 CORONARY ARTERY DISEASE INVOLVING NATIVE CORONARY ARTERY OF NATIVE HEART WITHOUT ANGINA PECTORIS: Primary | ICD-10-CM

## 2023-08-16 PROCEDURE — 3078F DIAST BP <80 MM HG: CPT | Performed by: INTERNAL MEDICINE

## 2023-08-16 PROCEDURE — 1036F TOBACCO NON-USER: CPT | Performed by: INTERNAL MEDICINE

## 2023-08-16 PROCEDURE — 99214 OFFICE O/P EST MOD 30 MIN: CPT | Performed by: INTERNAL MEDICINE

## 2023-08-16 PROCEDURE — 1123F ACP DISCUSS/DSCN MKR DOCD: CPT | Performed by: INTERNAL MEDICINE

## 2023-08-16 PROCEDURE — G8427 DOCREV CUR MEDS BY ELIG CLIN: HCPCS | Performed by: INTERNAL MEDICINE

## 2023-08-16 PROCEDURE — 3075F SYST BP GE 130 - 139MM HG: CPT | Performed by: INTERNAL MEDICINE

## 2023-08-16 PROCEDURE — 93000 ELECTROCARDIOGRAM COMPLETE: CPT | Performed by: INTERNAL MEDICINE

## 2023-08-16 PROCEDURE — G8420 CALC BMI NORM PARAMETERS: HCPCS | Performed by: INTERNAL MEDICINE

## 2023-08-16 RX ORDER — CARVEDILOL 3.12 MG/1
3.12 TABLET ORAL 2 TIMES DAILY
Qty: 180 TABLET | Refills: 3 | Status: SHIPPED | OUTPATIENT
Start: 2023-08-16

## 2023-08-16 NOTE — PROGRESS NOTES
Silke Sharon  1945  Date of Service: 2023    Patient Active Problem List    Diagnosis Date Noted    Dizziness 2022     Priority: Medium    Lumbar stenosis with neurogenic claudication 2022    Lumbar disc herniation with radiculopathy 2021    Type 2 diabetes mellitus without complication, without long-term current use of insulin ([address]) 2021    Primary insomnia 2020    Carotid atherosclerosis, bilateral 2020    Dupuytren's disease of palm of left hand 2020    Benign prostatic hyperplasia without lower urinary tract symptoms 2019    Lumbar disc disease 2017    Status post placement of implantable loop recorder 10/25/2016    PFO (patent foramen ovale) 10/11/2016    Hx of CABG 10/10/2016    Essential hypertension 10/10/2016    Mixed hyperlipidemia 10/10/2016    Carotid artery stenosis, asymptomatic, bilateral 2016    Coronary artery disease involving native coronary artery of native heart without angina pectoris 2016     Overview Note:     4-16 LM stenosis  CABG  LIMA - LAD  SVG - OM  SVG - Ramus           Social History     Socioeconomic History    Marital status:      Spouse name: None    Number of children: None    Years of education: None    Highest education level: None   Occupational History    Occupation: - retired   Tobacco Use    Smoking status: Former     Packs/day: 1.00     Years: 56.00     Pack years: 56.00     Types: Cigarettes     Start date:      Quit date: 2016     Years since quittin.3    Smokeless tobacco: Never   Vaping Use    Vaping Use: Never used    Passive vaping exposure: Yes   Substance and Sexual Activity    Alcohol use: Never    Drug use: No    Sexual activity: Not Currently     Partners: Female              No Known Allergies    Chief Complaint:  Silke Herrera is here today for follow up and management/recomendations for CAD     History of Present Illness: Silke Herrera

## 2023-12-11 RX ORDER — LOSARTAN POTASSIUM 50 MG/1
50 TABLET ORAL DAILY
Qty: 90 TABLET | Refills: 3 | Status: SHIPPED | OUTPATIENT
Start: 2023-12-11

## 2024-01-06 NOTE — PROGRESS NOTES
[address]   PRE-ADMISSION TESTING GENERAL INSTRUCTIONS  [facility] Phone Number: [phone]      GENERAL INSTRUCTIONS:    [x] Antibacterial Soap Shower Night before Surgery  [x] Pratik (CHG) wipe instruction sheet and wipes given. [x] No lotions, powders, deodorant. [x] Nothing by mouth after midnight, including gum, candy, mints, or water. [x] You may brush your teeth, gargle, but do NOT swallow water. [x] No tobacco products, illegal drugs, or alcohol within 24 hours of your surgery. [x] Jewelry or valuables should not be brought to the hospital. All body and/or tongue piercing's must be removed prior to arriving to hospital. ALL hair pins must be removed. [x] Bring insurance card and photo ID. [x] Transfusion Bracelet: Please bring with you to hospital, day of surgery     PARKING INSTRUCTIONS:     · [x] ARRIVAL TIME: 0500 on 3/30/22  · [x] Enter into the [employer]. Two people may accompany you. Masks are required. · [x] Parking Lot \"I\" is where you will park. It is located on the corner of [address] and [address]. The entrance is on [address]. · To enter, press the button and the gate will lift. A free token will be provided to exit the lot. EDUCATION INSTRUCTIONS:        [x] Pre-admission Testing educational folder given  [x] Incentive Spirometry,coughing & deep breathing exercises reviewed. [x] Fluoroscopy-Xray used in surgery reviewed with patient. Educational pamphlet placed in chart. [x] Pain: Post-op pain is normal and to be expected. You will be asked to rate your pain from 0-10. [x] Ask your nurse for your pain medication. MEDICATION INSTRUCTIONS:    [x] Bring a complete list of your medications, please write the last time you took the medicine, give this list to the nurse. [x] Take the following medications the morning of surgery with 1-2 ounces of water: gabapentin, metoprolol, and protonix.   [x] Stop herbal supplements and vitamins [facility] EMERGENCY DEPARTMENT  [address] [address]  Phone: [phone]    Erick England was seen and treated in our emergency department on 1/6/2024.  He may return to school on 01/08/2024.          Thank you for choosing [facility].    Jean Lucio, DO days before your surgery. [x] DO NOT take any diabetic medicine the morning of surgery. Follow instructions for insulin the day before surgery. [x] If you are diabetic and your blood sugar is low or you feel symptomatic, you may drink 1-2 ounces of apple juice or take a glucose tablet.            -The morning of your procedure, you may call the pre-op area if you have concerns about your blood sugar [phone]. [x] Use your inhalers the morning of surgery. Bring your emergency inhaler with you day of surgery. [x] Follow physician instructions regarding any blood thinners you may be taking. WHAT TO EXPECT:    [x] The day of surgery you will be greeted and checked in by the [employer].  In addition, you will be registered in the [place] by a Patient Access Representative. Please bring your photo ID and insurance card. A nurse will greet you in accordance to the time you are needed in the pre-op area to prepare you for surgery. Please do not be discouraged if you are not greeted in the order you arrive as there are many variables that are involved in patient preparation. Your patience is greatly appreciated as you wait for your nurse. Please bring in items such as: books, magazines, newspapers, electronics, or any other items  to occupy your time in the waiting area. [x]  Delays may occur with surgery and staff will make a sincere effort to keep you informed of delays. If any delays occur with your procedure, we apologize ahead of time for your inconvenience as we recognize the value of your time.

## 2024-02-07 RX ORDER — CLOPIDOGREL BISULFATE 75 MG/1
TABLET ORAL
Qty: 90 TABLET | Refills: 3 | Status: SHIPPED | OUTPATIENT
Start: 2024-02-07

## 2024-10-06 ENCOUNTER — HOSPITAL ENCOUNTER (EMERGENCY)
Age: 79
Discharge: HOME | End: 2024-10-06
Attending: STUDENT IN AN ORGANIZED HEALTH CARE EDUCATION/TRAINING PROGRAM
Payer: MEDICARE

## 2024-10-06 VITALS
TEMPERATURE: 97.7 F | SYSTOLIC BLOOD PRESSURE: 164 MMHG | BODY MASS INDEX: 22.5 KG/M2 | DIASTOLIC BLOOD PRESSURE: 88 MMHG | RESPIRATION RATE: 16 BRPM | WEIGHT: 140 LBS | HEIGHT: 66 IN | OXYGEN SATURATION: 95 % | HEART RATE: 61 BPM

## 2024-10-06 DIAGNOSIS — B02.9 HERPES ZOSTER WITHOUT COMPLICATION: Primary | ICD-10-CM

## 2024-10-06 PROCEDURE — 6370000000 HC RX 637 (ALT 250 FOR IP): Performed by: STUDENT IN AN ORGANIZED HEALTH CARE EDUCATION/TRAINING PROGRAM

## 2024-10-06 PROCEDURE — 99283 EMERGENCY DEPT VISIT LOW MDM: CPT

## 2024-10-06 RX ORDER — PREDNISONE 20 MG/1
60 TABLET ORAL ONCE
Status: COMPLETED | OUTPATIENT
Start: 2024-10-06 | End: 2024-10-06

## 2024-10-06 RX ORDER — PREDNISONE 50 MG/1
50 TABLET ORAL DAILY
Qty: 5 TABLET | Refills: 0 | Status: SHIPPED | OUTPATIENT
Start: 2024-10-06 | End: 2024-10-11

## 2024-10-06 RX ORDER — VALACYCLOVIR HYDROCHLORIDE 1 G/1
1000 TABLET, FILM COATED ORAL ONCE
Status: COMPLETED | OUTPATIENT
Start: 2024-10-06 | End: 2024-10-06

## 2024-10-06 RX ORDER — OXYCODONE AND ACETAMINOPHEN 5; 325 MG/1; MG/1
1 TABLET ORAL EVERY 6 HOURS PRN
Qty: 12 TABLET | Refills: 0 | Status: SHIPPED | OUTPATIENT
Start: 2024-10-06 | End: 2024-10-09

## 2024-10-06 RX ORDER — VALACYCLOVIR HYDROCHLORIDE 1 G/1
1000 TABLET, FILM COATED ORAL 3 TIMES DAILY
Qty: 21 TABLET | Refills: 0 | Status: SHIPPED | OUTPATIENT
Start: 2024-10-06 | End: 2024-10-13

## 2024-10-06 RX ORDER — OXYCODONE AND ACETAMINOPHEN 5; 325 MG/1; MG/1
1 TABLET ORAL ONCE
Status: COMPLETED | OUTPATIENT
Start: 2024-10-06 | End: 2024-10-06

## 2024-10-06 RX ADMIN — PREDNISONE 60 MG: 20 TABLET ORAL at 06:49

## 2024-10-06 RX ADMIN — OXYCODONE HYDROCHLORIDE AND ACETAMINOPHEN 1 TABLET: 5; 325 TABLET ORAL at 06:48

## 2024-10-06 RX ADMIN — VALACYCLOVIR 1000 MG: 1 TABLET, FILM COATED ORAL at 06:48

## 2024-10-06 ASSESSMENT — PAIN SCALES - GENERAL: PAINLEVEL_OUTOF10: 9

## 2024-10-06 ASSESSMENT — PAIN DESCRIPTION - FREQUENCY: FREQUENCY: CONTINUOUS

## 2024-10-06 ASSESSMENT — PAIN - FUNCTIONAL ASSESSMENT: PAIN_FUNCTIONAL_ASSESSMENT: 0-10

## 2024-10-06 NOTE — DISCHARGE INSTRUCTIONS
Complete course of antiviral.  Use pain medications.  Follow-up with family doctor.  Return for any significant worsening symptoms or other acute symptoms or concerns.

## 2024-10-07 NOTE — ED PROVIDER NOTES
[facility] EMERGENCY DEPARTMENT  EMERGENCY DEPARTMENT ENCOUNTER        Pt Name: Andrei Ashton  MRN: [id number]  Birthdate 1945  Date of evaluation: 10/6/2024  Provider: Rebecca Encinas DO  PCP: Fredis Mcmanus DO  Note Started: 4:05 AM EDT 10/7/24    CHIEF COMPLAINT       Chief Complaint   Patient presents with    Flank Pain     Left flank pain, rash       HISTORY OF PRESENT ILLNESS: 1 or more Elements   History From: patient    Limitations to history : None    Andrei Ashton is a 79 y.o. male with a history of CAD, COPD, hypertension, hyperlipidemia, type 2 diabetes presenting to the emergency department complaining of a rash and left-sided flank pain.  Patient states that he noticed it within the past 24 hours and has been progressively worsening, persistent, moderate, not makes it better or worse.  He not try anything for symptoms at home.  He states he has never had symptoms like this in the past.  Denies any fevers, chills, lightheadedness, dizziness, syncope, recent hospitalization, recent illness, chest pain, shortness of breath, hemoptysis, abdominal pain, nausea, vomiting, diarrhea, dysuria, hematuria, or other acute symptoms or concerns.  Of note, patient also complains of pain in his right hip at times.  He recently 10 x-ray which I was able to see in our system which showed evidence of degenerative changes and osteoarthritis.  Patient states that he has had this for very long time and does follow-up closely as an outpatient for this.    Nursing Notes were all reviewed and agreed with or any disagreements were addressed in the HPI.    REVIEW OF SYSTEMS :      Review of Systems    Positives and Pertinent negatives as per HPI.     SURGICAL HISTORY     Past Surgical History:   Procedure Laterality Date    ANKLE FRACTURE SURGERY Left 2020    BACK SURGERY      CARDIAC CATHETERIZATION  4/26/2016    Dr Doss    COLECTOMY      CORONARY ARTERY BYPASS GRAFT

## 2024-12-04 ENCOUNTER — HOSPITAL ENCOUNTER (INPATIENT)
Age: 79
LOS: 2 days | Discharge: HOME | DRG: 313 | End: 2024-12-06
Attending: STUDENT IN AN ORGANIZED HEALTH CARE EDUCATION/TRAINING PROGRAM | Admitting: GENERAL PRACTICE
Payer: MEDICARE

## 2024-12-04 ENCOUNTER — APPOINTMENT (OUTPATIENT)
Dept: GENERAL RADIOLOGY | Age: 79
DRG: 313 | End: 2024-12-04
Payer: MEDICARE

## 2024-12-04 ENCOUNTER — TELEPHONE (OUTPATIENT)
Dept: ADMINISTRATIVE | Age: 79
End: 2024-12-04

## 2024-12-04 DIAGNOSIS — R07.9 ACUTE CHEST PAIN: Primary | ICD-10-CM

## 2024-12-04 DIAGNOSIS — R07.9 CHEST PAIN, UNSPECIFIED TYPE: ICD-10-CM

## 2024-12-04 DIAGNOSIS — R94.31 NONSPECIFIC ST-T WAVE ELECTROCARDIOGRAPHIC CHANGES: ICD-10-CM

## 2024-12-04 LAB
ALBUMIN SERPL-MCNC: 4.2 G/DL (ref 3.5–5.2)
ALP SERPL-CCNC: 124 U/L (ref 40–129)
ALT SERPL-CCNC: 24 U/L (ref 0–40)
ANION GAP SERPL CALCULATED.3IONS-SCNC: 10 MMOL/L (ref 7–16)
AST SERPL-CCNC: 21 U/L (ref 0–39)
BASOPHILS # BLD: 0.03 K/UL (ref 0–0.2)
BASOPHILS NFR BLD: 0 % (ref 0–2)
BILIRUB SERPL-MCNC: 0.7 MG/DL (ref 0–1.2)
BNP SERPL-MCNC: 121 PG/ML (ref 0–450)
BUN SERPL-MCNC: 14 MG/DL (ref 6–23)
CALCIUM SERPL-MCNC: 9.9 MG/DL (ref 8.6–10.2)
CHLORIDE SERPL-SCNC: 104 MMOL/L (ref 98–107)
CO2 SERPL-SCNC: 26 MMOL/L (ref 22–29)
CREAT SERPL-MCNC: 1 MG/DL (ref 0.7–1.2)
D-DIMER QUANTITATIVE: <200 NG/ML DDU (ref 0–230)
EOSINOPHIL # BLD: 0.22 K/UL (ref 0.05–0.5)
EOSINOPHILS RELATIVE PERCENT: 3 % (ref 0–6)
ERYTHROCYTE [DISTWIDTH] IN BLOOD BY AUTOMATED COUNT: 14.4 % (ref 11.5–15)
GFR, ESTIMATED: 72 ML/MIN/1.73M2
GLUCOSE SERPL-MCNC: 115 MG/DL (ref 74–99)
HCT VFR BLD AUTO: 48.5 % (ref 37–54)
HGB BLD-MCNC: 16.3 G/DL (ref 12.5–16.5)
IMM GRANULOCYTES # BLD AUTO: 0.03 K/UL (ref 0–0.58)
IMM GRANULOCYTES NFR BLD: 0 % (ref 0–5)
LIPASE SERPL-CCNC: 12 U/L (ref 13–60)
LYMPHOCYTES NFR BLD: 1.57 K/UL (ref 1.5–4)
LYMPHOCYTES RELATIVE PERCENT: 20 % (ref 20–42)
MAGNESIUM SERPL-MCNC: 2.4 MG/DL (ref 1.6–2.6)
MCH RBC QN AUTO: 31.9 PG (ref 26–35)
MCHC RBC AUTO-ENTMCNC: 33.6 G/DL (ref 32–34.5)
MCV RBC AUTO: 94.9 FL (ref 80–99.9)
MONOCYTES NFR BLD: 0.87 K/UL (ref 0.1–0.95)
MONOCYTES NFR BLD: 11 % (ref 2–12)
NEUTROPHILS NFR BLD: 66 % (ref 43–80)
NEUTS SEG NFR BLD: 5.29 K/UL (ref 1.8–7.3)
PLATELET # BLD AUTO: 260 K/UL (ref 130–450)
PMV BLD AUTO: 11.5 FL (ref 7–12)
POTASSIUM SERPL-SCNC: 4.1 MMOL/L (ref 3.5–5)
PROT SERPL-MCNC: 7.5 G/DL (ref 6.4–8.3)
RBC # BLD AUTO: 5.11 M/UL (ref 3.8–5.8)
SODIUM SERPL-SCNC: 140 MMOL/L (ref 132–146)
TROPONIN I SERPL HS-MCNC: 10 NG/L (ref 0–11)
TROPONIN I SERPL HS-MCNC: 9 NG/L (ref 0–11)
WBC OTHER # BLD: 8 K/UL (ref 4.5–11.5)

## 2024-12-04 PROCEDURE — 83690 ASSAY OF LIPASE: CPT

## 2024-12-04 PROCEDURE — 83735 ASSAY OF MAGNESIUM: CPT

## 2024-12-04 PROCEDURE — 93005 ELECTROCARDIOGRAM TRACING: CPT | Performed by: STUDENT IN AN ORGANIZED HEALTH CARE EDUCATION/TRAINING PROGRAM

## 2024-12-04 PROCEDURE — G0378 HOSPITAL OBSERVATION PER HR: HCPCS

## 2024-12-04 PROCEDURE — 80053 COMPREHEN METABOLIC PANEL: CPT

## 2024-12-04 PROCEDURE — 2060000000 HC ICU INTERMEDIATE R&B

## 2024-12-04 PROCEDURE — 85379 FIBRIN DEGRADATION QUANT: CPT

## 2024-12-04 PROCEDURE — 99285 EMERGENCY DEPT VISIT HI MDM: CPT

## 2024-12-04 PROCEDURE — 6370000000 HC RX 637 (ALT 250 FOR IP): Performed by: GENERAL PRACTICE

## 2024-12-04 PROCEDURE — 71046 X-RAY EXAM CHEST 2 VIEWS: CPT

## 2024-12-04 PROCEDURE — 6370000000 HC RX 637 (ALT 250 FOR IP): Performed by: STUDENT IN AN ORGANIZED HEALTH CARE EDUCATION/TRAINING PROGRAM

## 2024-12-04 PROCEDURE — 85025 COMPLETE CBC W/AUTO DIFF WBC: CPT

## 2024-12-04 PROCEDURE — 84484 ASSAY OF TROPONIN QUANT: CPT

## 2024-12-04 PROCEDURE — 83880 ASSAY OF NATRIURETIC PEPTIDE: CPT

## 2024-12-04 RX ORDER — ASPIRIN 81 MG/1
324 TABLET, CHEWABLE ORAL ONCE
Status: COMPLETED | OUTPATIENT
Start: 2024-12-04 | End: 2024-12-04

## 2024-12-04 RX ORDER — LOSARTAN POTASSIUM 50 MG/1
50 TABLET ORAL DAILY
Status: DISCONTINUED | OUTPATIENT
Start: 2024-12-05 | End: 2024-12-06 | Stop reason: HOSPADM

## 2024-12-04 RX ORDER — LOSARTAN POTASSIUM 50 MG/1
50 TABLET ORAL ONCE
Status: COMPLETED | OUTPATIENT
Start: 2024-12-04 | End: 2024-12-04

## 2024-12-04 RX ORDER — LOSARTAN POTASSIUM 25 MG/1
25 TABLET ORAL ONCE
Status: DISCONTINUED | OUTPATIENT
Start: 2024-12-04 | End: 2024-12-04

## 2024-12-04 RX ORDER — CLOPIDOGREL BISULFATE 75 MG/1
75 TABLET ORAL DAILY
Status: DISCONTINUED | OUTPATIENT
Start: 2024-12-05 | End: 2024-12-06 | Stop reason: HOSPADM

## 2024-12-04 RX ORDER — ZOLPIDEM TARTRATE 5 MG/1
10 TABLET ORAL EVERY EVENING
Status: DISCONTINUED | OUTPATIENT
Start: 2024-12-04 | End: 2024-12-06 | Stop reason: HOSPADM

## 2024-12-04 RX ORDER — GABAPENTIN 300 MG/1
300 CAPSULE ORAL 2 TIMES DAILY
Status: DISCONTINUED | OUTPATIENT
Start: 2024-12-04 | End: 2024-12-06 | Stop reason: HOSPADM

## 2024-12-04 RX ORDER — ATORVASTATIN CALCIUM 40 MG/1
40 TABLET, FILM COATED ORAL DAILY
Status: DISCONTINUED | OUTPATIENT
Start: 2024-12-05 | End: 2024-12-06 | Stop reason: HOSPADM

## 2024-12-04 RX ORDER — REGADENOSON 0.08 MG/ML
0.4 INJECTION, SOLUTION INTRAVENOUS
Status: CANCELLED | OUTPATIENT
Start: 2024-12-04

## 2024-12-04 RX ORDER — CARVEDILOL 3.12 MG/1
3.12 TABLET ORAL 2 TIMES DAILY WITH MEALS
Status: DISCONTINUED | OUTPATIENT
Start: 2024-12-05 | End: 2024-12-06 | Stop reason: HOSPADM

## 2024-12-04 RX ORDER — TRAMADOL HYDROCHLORIDE 50 MG/1
50 TABLET ORAL EVERY 4 HOURS PRN
Status: DISCONTINUED | OUTPATIENT
Start: 2024-12-04 | End: 2024-12-06 | Stop reason: HOSPADM

## 2024-12-04 RX ADMIN — LOSARTAN POTASSIUM 50 MG: 50 TABLET, FILM COATED ORAL at 22:11

## 2024-12-04 RX ADMIN — TRAMADOL HYDROCHLORIDE 50 MG: 50 TABLET, COATED ORAL at 22:11

## 2024-12-04 RX ADMIN — ZOLPIDEM TARTRATE 10 MG: 5 TABLET ORAL at 22:11

## 2024-12-04 RX ADMIN — ASPIRIN 81 MG CHEWABLE TABLET 324 MG: 81 TABLET CHEWABLE at 15:15

## 2024-12-04 RX ADMIN — GABAPENTIN 300 MG: 300 CAPSULE ORAL at 22:11

## 2024-12-04 ASSESSMENT — PAIN DESCRIPTION - DESCRIPTORS
DESCRIPTORS: ACHING;CRAMPING;SHOOTING
DESCRIPTORS: STABBING

## 2024-12-04 ASSESSMENT — PAIN DESCRIPTION - LOCATION
LOCATION: CHEST
LOCATION: BACK
LOCATION: BACK
LOCATION: CHEST
LOCATION: BACK

## 2024-12-04 ASSESSMENT — LIFESTYLE VARIABLES
HOW OFTEN DO YOU HAVE A DRINK CONTAINING ALCOHOL: NEVER
HOW MANY STANDARD DRINKS CONTAINING ALCOHOL DO YOU HAVE ON A TYPICAL DAY: PATIENT DOES NOT DRINK
HOW OFTEN DO YOU HAVE A DRINK CONTAINING ALCOHOL: NEVER
HOW MANY STANDARD DRINKS CONTAINING ALCOHOL DO YOU HAVE ON A TYPICAL DAY: PATIENT DOES NOT DRINK

## 2024-12-04 ASSESSMENT — PAIN SCALES - GENERAL
PAINLEVEL_OUTOF10: 7
PAINLEVEL_OUTOF10: 4
PAINLEVEL_OUTOF10: 10
PAINLEVEL_OUTOF10: 10
PAINLEVEL_OUTOF10: 0

## 2024-12-04 ASSESSMENT — PAIN DESCRIPTION - ORIENTATION
ORIENTATION: LOWER
ORIENTATION: LEFT

## 2024-12-04 ASSESSMENT — PAIN - FUNCTIONAL ASSESSMENT
PAIN_FUNCTIONAL_ASSESSMENT: 0-10

## 2024-12-04 NOTE — TELEPHONE ENCOUNTER
I agree.  He needs to go to the emergency room.  He continues to refuse the best that we can do as an outpatient will be to schedule him for a stress test and an office visit the next available.  This is really not something that should be done as an outpatient.

## 2024-12-04 NOTE — TELEPHONE ENCOUNTER
Spoke with patient's daughter (Kelsey).  Kelsey states the pain is intermittent for the last couple of days.  She believes he is having it at rest and with exertion and he does have some shortness of breath as well.  Patient declined going to the ER.

## 2024-12-04 NOTE — TELEPHONE ENCOUNTER
Pt having pain in chest x few days.  Attempted to reach staff, daughter hung up.  Please contact Hammad

## 2024-12-04 NOTE — ED PROVIDER NOTES
[facility] EMERGENCY DEPARTMENT  EMERGENCY DEPARTMENT ENCOUNTER        Pt Name: Andrei Ashton  MRN: [id number]  Birthdate 1945  Date of evaluation: 12/4/2024  Provider: Mercedes Espana DO  PCP: Fredis Mcmanus DO  Note Started: 6:05 PM EST 12/4/24    CHIEF COMPLAINT       Chief Complaint   Patient presents with   • Chest Pain     Onset last night, worsening today       HISTORY OF PRESENT ILLNESS: 1 or more Elements     Limitations to history : None    Andrei Ashton is a 79 y.o. male with past medical history of CAD s/p CABG who presents to the ED for evaluation of chest pain.  Says it feels like when he had his heart attack in the past; he has been having sharp stabbing episodic chest pain to the left side, sometimes worsen with deep breathing.  He reports mild shortness of breath as well.  No diaphoresis nausea or vomiting.  No exertional symptoms; it has been occurring at rest at least 3-4 times since last night.  Denies any pain right now.  No abdominal pain nausea vomiting diarrhea or abnormal urinary symptoms.  Denies any cough or sore throat or recent illnesses.  No numbness or weakness anywhere.  No headaches dizziness or changes in vision.      Nursing Notes were all reviewed and agreed with or any disagreements were addressed in the HPI.      REVIEW OF EXTERNAL NOTE :       Reviewed documentation from telephone encounter with cardiology from yesterday noting that the patient was recommended to go to the emergency room as his stress test is \"not something that should be done as an outpatient.\"    Chart Review/External Note Review    Last Echo reviewed by Me:  Lab Results   Component Value Date    LVEF 60 01/18/2018           Controlled Substance Monitoring:    Acute and Chronic Pain Monitoring:   RX Monitoring Periodic Controlled Substance Monitoring   9/20/2022  11:20 AM Possible medication side effects, risk of tolerance/dependence & alternative  visualized and preliminarily interpreted by the ED Provider with the above-noted findings. Interpretation per the Radiologist below, if available at the time of this note are included below.      EKG reviewed and interpreted by me, TIME:  This EKG is signed by me, Mercedes Espana DO.     See ED course for EKG documentation.    All labs & imaging were reviewed and interpreted by me, see RESULTS. I have personally reviewed all laboratory and imaging results for this patient.       Are there any additional factors to consider that affect care (uninsured, homeless, illiterate, history from another source, etc.) (If yes, which ones).  No       This patient's ED course included: a personal history and physicial examination, re-evaluation prior to disposition, multiple bedside re-evaluations, cardiac monitoring, continuous pulse oximetry, and complex medical decision making and emergency management    This patient has been closely monitored during their ED course.    Counseling:   The emergency provider has spoken with the patient and discussed today’s results, in addition to providing specific details for the plan of care and counseling regarding the diagnosis and prognosis.  Questions are answered at this time and they are agreeable with the plan.    CONSULTS:   IP CONSULT TO INTERNAL MEDICINE       Please see ED course for any additional MDM documentation.       CRITICAL CARE TIME (.cct)     0 minutes            I am the Primary Clinician of Record.    FINAL IMPRESSION      1. Acute chest pain          DISPOSITION/PLAN     DISPOSITION Decision To Admit 12/04/2024 06:04:35 PM    Disposition: Admit to telemetry  Patient condition is stable      PATIENT REFERRED TO:  No follow-up provider specified.    DISCHARGE MEDICATIONS:  New Prescriptions    No medications on file       DISCONTINUED MEDICATIONS:  Discontinued Medications    No medications on file                   Mercedes Espana D.O.     Emergency Medicine

## 2024-12-05 ENCOUNTER — APPOINTMENT (OUTPATIENT)
Age: 79
DRG: 313 | End: 2024-12-05
Attending: INTERNAL MEDICINE
Payer: MEDICARE

## 2024-12-05 ENCOUNTER — APPOINTMENT (OUTPATIENT)
Dept: NUCLEAR MEDICINE | Age: 79
DRG: 313 | End: 2024-12-05
Attending: INTERNAL MEDICINE
Payer: MEDICARE

## 2024-12-05 ENCOUNTER — CLINICAL DOCUMENTATION (OUTPATIENT)
Dept: SPIRITUAL SERVICES | Age: 79
End: 2024-12-05

## 2024-12-05 LAB
ALBUMIN SERPL-MCNC: 3.5 G/DL (ref 3.5–5.2)
ALP SERPL-CCNC: 105 U/L (ref 40–129)
ALT SERPL-CCNC: 21 U/L (ref 0–40)
ANION GAP SERPL CALCULATED.3IONS-SCNC: 13 MMOL/L (ref 7–16)
AST SERPL-CCNC: 21 U/L (ref 0–39)
BASOPHILS # BLD: 0.03 K/UL (ref 0–0.2)
BASOPHILS NFR BLD: 1 % (ref 0–2)
BILIRUB SERPL-MCNC: 1 MG/DL (ref 0–1.2)
BNP SERPL-MCNC: 75 PG/ML (ref 0–450)
BUN SERPL-MCNC: 17 MG/DL (ref 6–23)
CALCIUM SERPL-MCNC: 9.2 MG/DL (ref 8.6–10.2)
CHLORIDE SERPL-SCNC: 104 MMOL/L (ref 98–107)
CHOLEST SERPL-MCNC: 199 MG/DL
CO2 SERPL-SCNC: 21 MMOL/L (ref 22–29)
CREAT SERPL-MCNC: 1.1 MG/DL (ref 0.7–1.2)
ECHO BSA: 1.72 M2
EKG ATRIAL RATE: 68 BPM
EKG P AXIS: 63 DEGREES
EKG P-R INTERVAL: 134 MS
EKG Q-T INTERVAL: 374 MS
EKG QRS DURATION: 86 MS
EKG QTC CALCULATION (BAZETT): 397 MS
EKG R AXIS: 27 DEGREES
EKG T AXIS: -16 DEGREES
EKG VENTRICULAR RATE: 68 BPM
EOSINOPHIL # BLD: 0.3 K/UL (ref 0.05–0.5)
EOSINOPHILS RELATIVE PERCENT: 5 % (ref 0–6)
ERYTHROCYTE [DISTWIDTH] IN BLOOD BY AUTOMATED COUNT: 14.5 % (ref 11.5–15)
GFR, ESTIMATED: 69 ML/MIN/1.73M2
GLUCOSE SERPL-MCNC: 103 MG/DL (ref 74–99)
HBA1C MFR BLD: 6.6 % (ref 4–5.6)
HCT VFR BLD AUTO: 45 % (ref 37–54)
HDLC SERPL-MCNC: 40 MG/DL
HGB BLD-MCNC: 15.1 G/DL (ref 12.5–16.5)
IMM GRANULOCYTES # BLD AUTO: 0.03 K/UL (ref 0–0.58)
IMM GRANULOCYTES NFR BLD: 1 % (ref 0–5)
LDLC SERPL CALC-MCNC: 120 MG/DL
LYMPHOCYTES NFR BLD: 1.86 K/UL (ref 1.5–4)
LYMPHOCYTES RELATIVE PERCENT: 29 % (ref 20–42)
MCH RBC QN AUTO: 31.6 PG (ref 26–35)
MCHC RBC AUTO-ENTMCNC: 33.6 G/DL (ref 32–34.5)
MCV RBC AUTO: 94.1 FL (ref 80–99.9)
MONOCYTES NFR BLD: 0.86 K/UL (ref 0.1–0.95)
MONOCYTES NFR BLD: 14 % (ref 2–12)
NEUTROPHILS NFR BLD: 52 % (ref 43–80)
NEUTS SEG NFR BLD: 3.29 K/UL (ref 1.8–7.3)
NUC STRESS EJECTION FRACTION: 93 %
PLATELET # BLD AUTO: 225 K/UL (ref 130–450)
PMV BLD AUTO: 11.7 FL (ref 7–12)
POTASSIUM SERPL-SCNC: 4.2 MMOL/L (ref 3.5–5)
PROT SERPL-MCNC: 6.2 G/DL (ref 6.4–8.3)
RBC # BLD AUTO: 4.78 M/UL (ref 3.8–5.8)
SODIUM SERPL-SCNC: 138 MMOL/L (ref 132–146)
STRESS BASELINE DIAS BP: 76 MMHG
STRESS BASELINE HR: 62 BPM
STRESS BASELINE SYS BP: 161 MMHG
STRESS ESTIMATED WORKLOAD: 1 METS
STRESS PEAK DIAS BP: 76 MMHG
STRESS PEAK SYS BP: 161 MMHG
STRESS PERCENT HR ACHIEVED: 61 %
STRESS POST PEAK HR: 85 BPM
STRESS RATE PRESSURE PRODUCT: NORMAL BPM*MMHG
STRESS TARGET HR: 139 BPM
TID: 0.83
TRIGL SERPL-MCNC: 197 MG/DL
TSH SERPL DL<=0.05 MIU/L-ACNC: 3.14 UIU/ML (ref 0.27–4.2)
VLDLC SERPL CALC-MCNC: 39 MG/DL
WBC OTHER # BLD: 6.4 K/UL (ref 4.5–11.5)

## 2024-12-05 PROCEDURE — 78452 HT MUSCLE IMAGE SPECT MULT: CPT

## 2024-12-05 PROCEDURE — 6370000000 HC RX 637 (ALT 250 FOR IP): Performed by: GENERAL PRACTICE

## 2024-12-05 PROCEDURE — 93018 CV STRESS TEST I&R ONLY: CPT | Performed by: INTERNAL MEDICINE

## 2024-12-05 PROCEDURE — 93017 CV STRESS TEST TRACING ONLY: CPT

## 2024-12-05 PROCEDURE — G0378 HOSPITAL OBSERVATION PER HR: HCPCS

## 2024-12-05 PROCEDURE — 85025 COMPLETE CBC W/AUTO DIFF WBC: CPT

## 2024-12-05 PROCEDURE — 99222 1ST HOSP IP/OBS MODERATE 55: CPT | Performed by: INTERNAL MEDICINE

## 2024-12-05 PROCEDURE — 6360000002 HC RX W HCPCS: Performed by: INTERNAL MEDICINE

## 2024-12-05 PROCEDURE — 36415 COLL VENOUS BLD VENIPUNCTURE: CPT

## 2024-12-05 PROCEDURE — 80053 COMPREHEN METABOLIC PANEL: CPT

## 2024-12-05 PROCEDURE — 78452 HT MUSCLE IMAGE SPECT MULT: CPT | Performed by: INTERNAL MEDICINE

## 2024-12-05 PROCEDURE — 84443 ASSAY THYROID STIM HORMONE: CPT

## 2024-12-05 PROCEDURE — 93010 ELECTROCARDIOGRAM REPORT: CPT | Performed by: INTERNAL MEDICINE

## 2024-12-05 PROCEDURE — 83880 ASSAY OF NATRIURETIC PEPTIDE: CPT

## 2024-12-05 PROCEDURE — 3430000000 HC RX DIAGNOSTIC RADIOPHARMACEUTICAL: Performed by: RADIOLOGY

## 2024-12-05 PROCEDURE — APPSS60 APP SPLIT SHARED TIME 46-60 MINUTES: Performed by: CLINICAL NURSE SPECIALIST

## 2024-12-05 PROCEDURE — 83036 HEMOGLOBIN GLYCOSYLATED A1C: CPT

## 2024-12-05 PROCEDURE — 80061 LIPID PANEL: CPT

## 2024-12-05 PROCEDURE — 93016 CV STRESS TEST SUPVJ ONLY: CPT | Performed by: INTERNAL MEDICINE

## 2024-12-05 PROCEDURE — 2060000000 HC ICU INTERMEDIATE R&B

## 2024-12-05 PROCEDURE — A9500 TC99M SESTAMIBI: HCPCS | Performed by: RADIOLOGY

## 2024-12-05 RX ORDER — TETRAKIS(2-METHOXYISOBUTYLISOCYANIDE)COPPER(I) TETRAFLUOROBORATE 1 MG/ML
30 INJECTION, POWDER, LYOPHILIZED, FOR SOLUTION INTRAVENOUS
Status: COMPLETED | OUTPATIENT
Start: 2024-12-05 | End: 2024-12-05

## 2024-12-05 RX ORDER — TETRAKIS(2-METHOXYISOBUTYLISOCYANIDE)COPPER(I) TETRAFLUOROBORATE 1 MG/ML
10 INJECTION, POWDER, LYOPHILIZED, FOR SOLUTION INTRAVENOUS
Status: COMPLETED | OUTPATIENT
Start: 2024-12-05 | End: 2024-12-05

## 2024-12-05 RX ORDER — REGADENOSON 0.08 MG/ML
0.4 INJECTION, SOLUTION INTRAVENOUS
Status: COMPLETED | OUTPATIENT
Start: 2024-12-05 | End: 2024-12-05

## 2024-12-05 RX ADMIN — LOSARTAN POTASSIUM 50 MG: 50 TABLET, FILM COATED ORAL at 09:38

## 2024-12-05 RX ADMIN — CARVEDILOL 3.12 MG: 3.12 TABLET, FILM COATED ORAL at 17:26

## 2024-12-05 RX ADMIN — TRAMADOL HYDROCHLORIDE 50 MG: 50 TABLET, COATED ORAL at 20:09

## 2024-12-05 RX ADMIN — GABAPENTIN 300 MG: 300 CAPSULE ORAL at 09:38

## 2024-12-05 RX ADMIN — ZOLPIDEM TARTRATE 10 MG: 5 TABLET ORAL at 17:26

## 2024-12-05 RX ADMIN — GABAPENTIN 300 MG: 300 CAPSULE ORAL at 20:10

## 2024-12-05 RX ADMIN — TRAMADOL HYDROCHLORIDE 50 MG: 50 TABLET, COATED ORAL at 14:09

## 2024-12-05 RX ADMIN — TRAMADOL HYDROCHLORIDE 50 MG: 50 TABLET, COATED ORAL at 05:01

## 2024-12-05 RX ADMIN — CLOPIDOGREL BISULFATE 75 MG: 75 TABLET ORAL at 09:38

## 2024-12-05 RX ADMIN — REGADENOSON 0.4 MG: 0.08 INJECTION, SOLUTION INTRAVENOUS at 12:12

## 2024-12-05 RX ADMIN — ATORVASTATIN CALCIUM 40 MG: 40 TABLET, FILM COATED ORAL at 09:38

## 2024-12-05 RX ADMIN — Medication 30 MILLICURIE: at 12:00

## 2024-12-05 RX ADMIN — Medication 10 MILLICURIE: at 10:59

## 2024-12-05 ASSESSMENT — PAIN DESCRIPTION - DESCRIPTORS
DESCRIPTORS: ACHING;SHOOTING;SORE
DESCRIPTORS: ACHING

## 2024-12-05 ASSESSMENT — PAIN - FUNCTIONAL ASSESSMENT: PAIN_FUNCTIONAL_ASSESSMENT: PREVENTS OR INTERFERES SOME ACTIVE ACTIVITIES AND ADLS

## 2024-12-05 ASSESSMENT — ENCOUNTER SYMPTOMS
COUGH: 0
VOMITING: 0
NAUSEA: 0
ABDOMINAL PAIN: 0
SHORTNESS OF BREATH: 1
DIARRHEA: 0

## 2024-12-05 ASSESSMENT — PAIN SCALES - GENERAL
PAINLEVEL_OUTOF10: 5
PAINLEVEL_OUTOF10: 0
PAINLEVEL_OUTOF10: 4
PAINLEVEL_OUTOF10: 3

## 2024-12-05 ASSESSMENT — PAIN DESCRIPTION - LOCATION
LOCATION: BACK;LEG
LOCATION: HIP

## 2024-12-05 ASSESSMENT — PAIN DESCRIPTION - ORIENTATION
ORIENTATION: LEFT;RIGHT
ORIENTATION: RIGHT

## 2024-12-05 NOTE — CONSULTS
Inpatient Cardiology Consultation      Reason for Consult:  CP     Consulting Physician: Dr Herman    Requesting Physician:  Dr Mcmanus    Date of Consultation: 12/5/2024    HISTORY OF PRESENT ILLNESS:   Andrei Ashton  is a 79 y.o.  male known to [facility] and followed by Dr. Doss.  Seen last in office on 8/16/23 at that time, /70 HR 44 weight 133 pounds \"Change metoprolol to Coreg to try to allow the heart rate to increase and improve the blood pressure.\"     PMH: see below    ED 12/4/2024 chest pain sent to ED by Dr. Doss  Arrival vitals: //82 HR 71 SpO2 98% on room air afebrile   Significant Labs: Troponin 9 and 10 NT proBNP 121 D-dimer less than 200 K 4.1 mag 2.4 BUN 14 CR 1.0 albumin 4.2 LFT WNL lipase 12 WBC 8.0 Hgb 16.3   RAD: 2 view chest x-ray no CP process right basilar scarring  ED treatment: Aspirin 324 mg  Lexiscan nuclear stress test has been ordered by attending  Patient seen and examined.  Recent vitals: BP 94/62 heart rate 60 SpO2 96% on room air afebrile weight 135 pounds BMI 22 via bed scale no intake and output  Patient is active at home.  He does not use an assistive device, drives, does house work, grocery shops - walking around store and carrying his groceries in - with No CONKLIN.  He does get \"rare\" chest pains that last \"1min\" and are \"not bad\".  He is complaint with all his medications.      For the past 3-4 days, he has had pain in his L chest over his Linq scare - when laying down only - stabbing for a few seconds.  Non radiating.  He could not rate pain, but said it was enough to be bothersome.  Associated symptoms:  No SOB, but did feel like he needed to \"take a deep breath\"  + 2 episodes only of nonspecific sweating, + intermittent rare palpitations, No dizziness / syncope, no N/V/D/indigestion/ early satiety or abdominal bloating.  Sleeps in bed on 2 pillows no orthopnea, PND, cough, swelling in legs, weight changes.   No bleeding issues - no    CBC:   Recent Labs     12/04/24  1448   WBC 8.0   HGB 16.3   HCT 48.5        BMP:   Recent Labs     12/04/24  1448      K 4.1   CO2 26   BUN 14   CREATININE 1.0   LABGLOM 72   CALCIUM 9.9     Mag:   Recent Labs     12/04/24  1448   MG 2.4     Phos: No results for input(s): \"PHOS\" in the last 72 hours.  TSH:   Lab Results   Component Value Date    TSH 2.560 05/25/2021       HgA1c:   Lab Results   Component Value Date    LABA1C 6.9 (H) 05/25/2021       PRO-BNP:   Lab Results   Component Value Date    PROBNP 121 12/04/2024    PROBNP 581 (H) 11/05/2021       HS TROP:  Lab Results   Component Value Date/Time    TROPHS 10 12/04/2024 05:08 PM    TROPHS 9 12/04/2024 02:48 PM    TROPHS 8 09/07/2022 07:58 PM    TROPHS 8 11/05/2021 02:42 PM     LIPID PANEL:  Lab Results   Component Value Date    CHOL 148 05/25/2021    TRIG 192 (H) 05/25/2021    HDL 34 05/25/2021    LDL 76 05/25/2021    VLDL 38 05/25/2021      LIVER PROFILE:  Recent Labs     12/04/24  1448   AST 21   ALT 24         Assessment:  Atypical CP.  Trop 9>>10.  No acute EKG changes. 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 Mobley  History of colectomy  Chronic back pain with lumbar spine surgery  Shingles on abdomen 9/2024    Plan:  Lexiscan Nuclear Stress Test today.  If negative, can be discharged home from a Cardiology standpoint    Check Lipid profile, TSH, A1C      Above assessment and plan are made in collaboration with Dr.Cuadra Joy Rubi APRN-[facility]

## 2024-12-05 NOTE — H&P
[place] [address]                           HISTORY & PHYSICAL      PATIENT NAME: SALINA MYERS              : 1945  MED REC NO: [id number]                        ROOM: [facility]  ACCOUNT NO: [id number]                       ADMIT DATE: 2024  PROVIDER: Fredis Mcmanus DO      HISTORY OF PRESENT ILLNESS:  The patient is a  male, 79 years old.  He has a longstanding history of coronary artery disease and has had CABG in the past, triple bypass, followed along by Cardiology.  He is also diabetic and hyperlipidemic.  Previous smoker.  He had some intermittent type of chest pain.  He describes it as sharp, however, was left-sided.  He did have some sweats with it and little bit of short of breath.  No nausea or vomiting.  No radiation of the pain.  His daughter insisted that he call his cardiologist or come to the emergency department and he did present here.  On arrival, his blood pressure was elevated at 189/93, he was saturating at 98% on room air.  Troponins were 9 and 10, respectively.  ProBNP was 121.  D-dimer less than 200.  BUN and creatinine stable.  White count stable.  Hemoglobin good.  Chest x-ray, no acute process.  EKG suggested inferior wall ischemic changes, new from previous electrocardiograms.  He was, therefore, admitted for definitive workup and evaluation.    RECENT/CURRENT MEDICATIONS:  Included clopidogrel 75 daily, losartan 50 daily, Coreg 3.125 b.i.d., tramadol as needed for chronic low back pain, Lipitor 40 daily, gabapentin 300 again for pain, Jardiance 25 daily.    PAST SURGICAL HISTORY:  Significant for ankle fracture surgery, back surgery, heart catheterization, colectomy, coronary artery bypass graft surgery, lumbar spine surgery.    PAST MEDICAL HISTORY:  Again, consistent with coronary artery disease as stated.    ALLERGIES:  HE HAS NO KNOWN ALLERGIES.      SOCIAL HISTORY:  Denies  any use of alcohol, tobacco, or narcotics.  He was a previous smoker.    FAMILY MEDICAL HISTORY:  Discussed and noted.    REVIEW OF SYSTEMS:  CONSTITUTIONAL:  Negative for cephalgia, vertigo, ringing in the ears, hearing loss, difficulty swallowing, hoarseness in his voice, bloody noses, difficulty swallowing.  CARDIOVASCULAR:  He currently describes this chest pain and has previous history of CABG and known coronary artery disease.    PULMONARY:  There is no palpitation, orthopnea, paroxysmal nocturnal dyspnea, or edema.  He has no cough, wheeze, shortness of breath, hemoptysis or dyspnea.   GASTROINTESTINAL:  He had some nausea.  No vomiting.  No diarrhea.  No constipation.  No blood in his stool.  No recent change in weight.   GENITOURINARY:  No urgency, frequency, dysuria, hematuria.   ENDOCRINE SYSTEM:  Positive for diabetes.  Negative for thyroid disorder.  MUSCULOSKELETAL SYSTEM:  Positive for chronic degenerative disk disease and failed low back syndrome.   SKIN:  Without rash, eruptions, urticaria, or pruritus.  PSYCHIATRIC SYSTEM:  Negative for anxiety or depression.  NEUROLOGIC SYSTEM:  Negative for CVA, seizures, tremor, tics, or TIAs.    PHYSICAL EXAMINATION:  GENERAL:  Shows this to be an 79-year-old  male, moderate distress with the above complaints.  HEAD, EYES, EARS, NOSE, AND THROAT:  Shows the head to be normocephalic and atraumatic.  Pupils are equal and reactive to light and accommodation.  Extraocular eye muscles are intact.  Tympanic membranes are clear.  Ear canals are patent.  Oral mucosa pink and moist.  NECK:  Veins are flat and nondistended.  No carotid bruits.  CHEST:  Symmetrical.  HEART:  Regular rate and rhythm without murmur, gallops, or friction rub.  LUNGS:  Clear to auscultation bilaterally without rhonchi, rales, or wheezes.  ABDOMEN:  Soft, nontender, and nondistended.  Bowel sounds are present in all 4 quadrants  EXTERNAL GENITALIA:  Intact.    EXTREMITIES:  Peripheral

## 2024-12-05 NOTE — PROGRESS NOTES
Patient noted with coarse tremor in BLE; per patient, this only happens every few months with no discernable cause. RN to continue to monitor.

## 2024-12-05 NOTE — ACP (ADVANCE CARE PLANNING)
Advance Care Planning     Advance Care Planning Inpatient Note  Spiritual Care Department    Today's Date: 12/5/2024  Unit: [facility]    Received request from patient.  Upon review of chart and communication with care team, patient's decision making abilities are not in question.. Patient, Spouse, and Child/Children was/were present in the room during visit.    Goals of ACP Conversation:  Discuss advance care planning documents  Facilitate a discussion related to patient's goals of care as they align with the patient's values and beliefs.    Health Care Decision Makers:       Primary Decision Maker: Kelsey Peoples - Child - [phone]    Secondary Decision Maker: Vivian Oviedo - Child - [phone]    Supplemental (Other) Decision Maker: Avelina Ashton - Child - [phone]  Summary:  Completed New Documents  Documented Next of Kin, per patient report    Advance Care Planning Documents (Patient Wishes):  Healthcare Power of /Advance Directive Appointment of Health Care Agent  Living Will/Advance Directive     Assessment:  Patient is able to ask and answer questions in relation to ACP docs.    Interventions:  Provided education on documents for clarity and greater understanding  Discussed and provided education on state decision maker hierarchy  Assisted in the completion of documents according to patient's wishes at this time  Encouraged ongoing ACP conversation with future decision makers and loved ones    Care Preferences Communicated:     Hospitalization:  If the patient's health worsens and it becomes clear that the chance of recovery is unlikely,     the patient wants hospitalization.    Ventilation:   If the patient, in their present state of health, suddenly became very ill and unable to breathe on their own,     the patient would NOT desire the use of a ventilator (breathing machine).    If their health worsens and it becomes clear that the change of recovery is unlikely,     the

## 2024-12-05 NOTE — PLAN OF CARE
Problem: Chronic Conditions and Co-morbidities  Goal: Patient's chronic conditions and co-morbidity symptoms are monitored and maintained or improved  Outcome: Progressing     Problem: Discharge Planning  Goal: Discharge to home or other facility with appropriate resources  Outcome: Progressing  Flowsheets (Taken 12/4/2024 2127)  Discharge to home or other facility with appropriate resources: Identify barriers to discharge with patient and caregiver     Problem: Pain  Goal: Verbalizes/displays adequate comfort level or baseline comfort level  Outcome: Progressing  Flowsheets (Taken 12/4/2024 2118)  Verbalizes/displays adequate comfort level or baseline comfort level:   Assess pain using appropriate pain scale   Administer analgesics based on type and severity of pain and evaluate response   Implement non-pharmacological measures as appropriate and evaluate response   Encourage patient to monitor pain and request assistance     Problem: Support with Decision-Making  Goal: Verbalization of thoughts/feelings regarding decision  Outcome: Progressing  Goal: Movement towards resolution of decision  Outcome: Progressing  Goal: Identifies/restores coping resources/skills  Outcome: Progressing  Goal: Explore resources for additional follow up, post discharge  Outcome: Progressing     Problem: Safety - Adult  Goal: Free from fall injury  Outcome: Progressing     Problem: ABCDS Injury Assessment  Goal: Absence of physical injury  Outcome: Progressing

## 2024-12-05 NOTE — PROGRESS NOTES
Spiritual Health History and Assessment/Progress Note  [facility]    Initial Encounter, Advance Care Planning,  ,  ,      Name: Andrei Ashton MRN: [id number]    Age: 79 y.o.     Sex: male   Language: English   Jewish: Hindu   Acute chest pain     Date: 12/5/2024                           Spiritual Assessment began in [facility] 6W MED SURG            Encounter Overview/Reason: Initial Encounter, Advance Care Planning  Service Provided For: Patient and family together, Significant other    Christina, Belief, Meaning:   Patient identifies as spiritual, is connected with a christina tradition or spiritual practice, and has beliefs or practices that help with coping during difficult times  Family/Friends identify as spiritual, are connected with a christina tradition or spiritual practice, and have beliefs or practices that help with coping during difficult times      Importance and Influence:  Patient has spiritual/personal beliefs that influence decisions regarding their health  Family/Friends have spiritual/personal beliefs that influence decisions regarding the patient's health    Community:  Patient feels well-supported. Support system includes: Spouse/Partner and Children  Family/Friends feel well-supported. Support system includes: Spouse/Partner and Children    Assessment and Plan of Care:     Patient Interventions include: Facilitated expression of thoughts and feelings and Assisted in Advance Care Planning conversation  Family/Friends Interventions include: Facilitated expression of thoughts and feelings and Assisted in Advance Care Planning conversation    Patient Plan of Care: Spiritual Care available upon further referral  Family/Friends Plan of Care: Spiritual Care available upon further referral    Electronically signed by ALFREDO Myers on 12/5/2024 at 10:52 AM

## 2024-12-05 NOTE — ED NOTES
ED to Inpatient Handoff Report    Notified Jeff that electronic handoff available and patient ready for transport to room 621.    Safety Risks: Risk of falls    Patient in Restraints: no    Constant Observer or Patient : no    Telemetry Monitoring Ordered: Yes     Cardiac Rhythm: Sinus rhythm    Order to transfer to unit without monitor: YES    Last MEWS: 1 Time completed: 20:39    Deterioration Index: 23.56    Vitals:    12/04/24 1822 12/04/24 1920 12/04/24 1945 12/04/24 2039   BP: (!) 153/78   (!) 175/117   Pulse: 66 62 66 77   Resp: 22 20 20 16   Temp: 98.7 °F (37.1 °C)   98.5 °F (36.9 °C)   TempSrc: Oral   Oral   SpO2: 98%   96%   Weight:       Height:           Opportunity for questions and clarification was provided.

## 2024-12-05 NOTE — PROGRESS NOTES
4 Eyes Skin Assessment     NAME:  Andrei Ashton  YOB: 1945  MEDICAL RECORD NUMBER:  [id number]    The patient is being assessed for  Admission    I agree that at least one RN has performed a thorough Head to Toe Skin Assessment on the patient. ALL assessment sites listed below have been assessed.      Areas assessed by both nurses:    Head, Face, Ears, Shoulders, Back, Chest, Arms, Elbows, Hands, Sacrum. Buttock, Coccyx, Ischium, Legs. Feet and Heels, and Under Medical Devices         Does the Patient have a Wound? No noted wound(s)       Michael Prevention initiated by RN: No  Wound Care Orders initiated by RN: No    Pressure Injury (Stage 3,4, Unstageable, DTI, NWPT, and Complex wounds) if present, place Wound referral order by RN under : No    New Ostomies, if present place, Ostomy referral order under : No     Nurse 1 eSignature: Electronically signed by Ya Church RN on 12/5/24 at 3:12 AM EST    **SHARE this note so that the co-signing nurse can place an eSignature**    Nurse 2 eSignature: Electronically signed by COURTNEY DIAZ RN on 12/5/24 at 3:44 AM EST

## 2024-12-05 NOTE — PLAN OF CARE
Problem: Chronic Conditions and Co-morbidities  Goal: Patient's chronic conditions and co-morbidity symptoms are monitored and maintained or improved  Outcome: Progressing     Problem: Discharge Planning  Goal: Discharge to home or other facility with appropriate resources  Outcome: Progressing     Problem: Pain  Goal: Verbalizes/displays adequate comfort level or baseline comfort level  Outcome: Progressing     Problem: Support with Decision-Making  Goal: Verbalization of thoughts/feelings regarding decision  Outcome: Progressing  Goal: Movement towards resolution of decision  Outcome: Progressing  Goal: Identifies/restores coping resources/skills  Outcome: Progressing  Goal: Explore resources for additional follow up, post discharge  Outcome: Progressing     Problem: Safety - Adult  Goal: Free from fall injury  Outcome: Progressing     Problem: ABCDS Injury Assessment  Goal: Absence of physical injury  Outcome: Progressing

## 2024-12-05 NOTE — PROGRESS NOTES
Occupational Therapy  Date:12/5/2024  Patient Name: Andrei Ashton  Room: 21/0621-A     Occupational Therapy (OT) order received, patient's medical record reviewed, and OT evaluation attempted this date; patient unavailable due to being off of unit. OT evaluation to be re-attempted at later time/date, as able/appropriate.    Gretta Canales OTDEN/L  License Number: [id number]

## 2024-12-06 VITALS
HEIGHT: 66 IN | RESPIRATION RATE: 16 BRPM | BODY MASS INDEX: 21.79 KG/M2 | OXYGEN SATURATION: 96 % | TEMPERATURE: 97.3 F | WEIGHT: 135.58 LBS | SYSTOLIC BLOOD PRESSURE: 109 MMHG | DIASTOLIC BLOOD PRESSURE: 57 MMHG | HEART RATE: 60 BPM

## 2024-12-06 PROCEDURE — 6370000000 HC RX 637 (ALT 250 FOR IP): Performed by: GENERAL PRACTICE

## 2024-12-06 PROCEDURE — G0378 HOSPITAL OBSERVATION PER HR: HCPCS

## 2024-12-06 RX ADMIN — GABAPENTIN 300 MG: 300 CAPSULE ORAL at 07:54

## 2024-12-06 RX ADMIN — ATORVASTATIN CALCIUM 40 MG: 40 TABLET, FILM COATED ORAL at 07:53

## 2024-12-06 RX ADMIN — TRAMADOL HYDROCHLORIDE 50 MG: 50 TABLET, COATED ORAL at 07:53

## 2024-12-06 RX ADMIN — CARVEDILOL 3.12 MG: 3.12 TABLET, FILM COATED ORAL at 07:53

## 2024-12-06 RX ADMIN — CLOPIDOGREL BISULFATE 75 MG: 75 TABLET ORAL at 07:53

## 2024-12-06 RX ADMIN — LOSARTAN POTASSIUM 50 MG: 50 TABLET, FILM COATED ORAL at 07:53

## 2024-12-06 RX ADMIN — TRAMADOL HYDROCHLORIDE 50 MG: 50 TABLET, COATED ORAL at 00:07

## 2024-12-06 ASSESSMENT — PAIN DESCRIPTION - ORIENTATION: ORIENTATION: LOWER

## 2024-12-06 ASSESSMENT — PAIN SCALES - GENERAL
PAINLEVEL_OUTOF10: 7
PAINLEVEL_OUTOF10: 3
PAINLEVEL_OUTOF10: 0

## 2024-12-06 ASSESSMENT — PAIN DESCRIPTION - DESCRIPTORS: DESCRIPTORS: ACHING;DISCOMFORT

## 2024-12-06 ASSESSMENT — PAIN - FUNCTIONAL ASSESSMENT: PAIN_FUNCTIONAL_ASSESSMENT: PREVENTS OR INTERFERES SOME ACTIVE ACTIVITIES AND ADLS

## 2024-12-06 ASSESSMENT — PAIN DESCRIPTION - LOCATION: LOCATION: BACK

## 2024-12-06 NOTE — PLAN OF CARE
Problem: Chronic Conditions and Co-morbidities  Goal: Patient's chronic conditions and co-morbidity symptoms are monitored and maintained or improved  12/6/2024 1029 by Andres Qureshi RN  Outcome: Completed  12/6/2024 0938 by Andres Qureshi RN  Outcome: Progressing     Problem: Discharge Planning  Goal: Discharge to home or other facility with appropriate resources  12/6/2024 1029 by Andres Qureshi RN  Outcome: Completed  12/6/2024 0938 by Andres Qureshi RN  Outcome: Progressing     Problem: Pain  Goal: Verbalizes/displays adequate comfort level or baseline comfort level  12/6/2024 1029 by Andres Qureshi RN  Outcome: Completed  12/6/2024 0938 by Andres Qureshi RN  Outcome: Progressing     Problem: Support with Decision-Making  Goal: Verbalization of thoughts/feelings regarding decision  12/6/2024 1029 by Andres Qureshi RN  Outcome: Completed  12/6/2024 0938 by Andres Qureshi RN  Outcome: Progressing  Goal: Movement towards resolution of decision  12/6/2024 1029 by Andres Qureshi RN  Outcome: Completed  12/6/2024 0938 by Andres Qureshi RN  Outcome: Progressing  Goal: Identifies/restores coping resources/skills  12/6/2024 1029 by Andres Qureshi RN  Outcome: Completed  12/6/2024 0938 by Andres Qureshi RN  Outcome: Progressing  Goal: Explore resources for additional follow up, post discharge  12/6/2024 1029 by Andres Qureshi RN  Outcome: Completed  12/6/2024 0938 by Andres Qureshi RN  Outcome: Progressing     Problem: Safety - Adult  Goal: Free from fall injury  12/6/2024 1029 by Andres Qureshi RN  Outcome: Completed  12/6/2024 0938 by Andres Qureshi RN  Outcome: Progressing     Problem: ABCDS Injury Assessment  Goal: Absence of physical injury  12/6/2024 1029 by Andres Qureshi RN  Outcome: Completed  12/6/2024 0938 by Andres Qureshi RN  Outcome: Progressing

## 2024-12-06 NOTE — ACP (ADVANCE CARE PLANNING)
Advance Care Planning   Healthcare Decision Maker:    Primary Decision Maker: Kelsey Peoples - Child - [phone]    Secondary Decision Maker: Vivian Oviedo - Child - [phone]    Supplemental (Other) Decision Maker: Avelina Ashton - Child - [phone]    Click here to complete Healthcare Decision Makers including selection of the Healthcare Decision Maker Relationship (ie \"Primary\").

## 2024-12-06 NOTE — CARE COORDINATION
Social work / Discharge planning:         Discharge order written.    Cardiology signed off.    No discharge needs identified at this time.   Electronically signed by CINDI Locke on 12/6/2024 at 10:32 AM

## 2024-12-06 NOTE — PLAN OF CARE
Problem: Chronic Conditions and Co-morbidities  Goal: Patient's chronic conditions and co-morbidity symptoms are monitored and maintained or improved  Outcome: Progressing     Problem: Discharge Planning  Goal: Discharge to home or other facility with appropriate resources  Outcome: Progressing     Problem: Pain  Goal: Verbalizes/displays adequate comfort level or baseline comfort level  Outcome: Progressing     Problem: Support with Decision-Making  Goal: Verbalization of thoughts/feelings regarding decision  Outcome: Progressing  Goal: Movement towards resolution of decision  Outcome: Progressing  Goal: Identifies/restores coping resources/skills  Outcome: Progressing  Goal: Explore resources for additional follow up, post discharge  Outcome: Progressing     Problem: Safety - Adult  Goal: Free from fall injury  Outcome: Progressing     Problem: ABCDS Injury Assessment  Goal: Absence of physical injury  Outcome: Progressing      Cheek-To-Nose Interpolation Flap Text: A decision was made to reconstruct the defect utilizing an interpolation axial flap and a staged reconstruction.  A telfa template was made of the defect.  This telfa template was then used to outline the Cheek-To-Nose Interpolation flap.  The donor area for the pedicle flap was then injected with anesthesia.  The flap was excised through the skin and subcutaneous tissue down to the layer of the underlying musculature.  The interpolation flap was carefully excised within this deep plane to maintain its blood supply.  The edges of the donor site were undermined.   The donor site was closed in a primary fashion.  The pedicle was then rotated into position and sutured.  Once the tube was sutured into place, adequate blood supply was confirmed with blanching and refill.  The pedicle was then wrapped with xeroform gauze and dressed appropriately with a telfa and gauze bandage to ensure continued blood supply and protect the attached pedicle.

## 2024-12-19 ENCOUNTER — TELEPHONE (OUTPATIENT)
Dept: CARDIOLOGY CLINIC | Age: 79
End: 2024-12-19

## 2024-12-22 NOTE — TELEPHONE ENCOUNTER
Stress test was good.  Follow-up with PCP for other noncardiac etiologies.  Follow-up with me in 2 to 3 months.

## 2024-12-23 NOTE — TELEPHONE ENCOUNTER
Patient's daughter (Kelsey) notified of stress test result and Dr. Doss's recommendations. F/U scheduled for  2/12/25 at 9:00 a.m.

## 2025-02-12 ENCOUNTER — OFFICE VISIT (OUTPATIENT)
Dept: CARDIOLOGY CLINIC | Age: 80
End: 2025-02-12
Payer: MEDICARE

## 2025-02-12 VITALS
OXYGEN SATURATION: 97 % | HEART RATE: 52 BPM | HEIGHT: 66 IN | BODY MASS INDEX: 22.42 KG/M2 | DIASTOLIC BLOOD PRESSURE: 60 MMHG | WEIGHT: 139.5 LBS | TEMPERATURE: 97.5 F | SYSTOLIC BLOOD PRESSURE: 142 MMHG | RESPIRATION RATE: 18 BRPM

## 2025-02-12 DIAGNOSIS — R06.02 SHORTNESS OF BREATH: ICD-10-CM

## 2025-02-12 DIAGNOSIS — I25.10 CORONARY ARTERY DISEASE INVOLVING NATIVE CORONARY ARTERY OF NATIVE HEART WITHOUT ANGINA PECTORIS: Primary | ICD-10-CM

## 2025-02-12 PROCEDURE — 1159F MED LIST DOCD IN RCRD: CPT | Performed by: INTERNAL MEDICINE

## 2025-02-12 PROCEDURE — G8420 CALC BMI NORM PARAMETERS: HCPCS | Performed by: INTERNAL MEDICINE

## 2025-02-12 PROCEDURE — 3077F SYST BP >= 140 MM HG: CPT | Performed by: INTERNAL MEDICINE

## 2025-02-12 PROCEDURE — 93000 ELECTROCARDIOGRAM COMPLETE: CPT | Performed by: INTERNAL MEDICINE

## 2025-02-12 PROCEDURE — 99214 OFFICE O/P EST MOD 30 MIN: CPT | Performed by: INTERNAL MEDICINE

## 2025-02-12 PROCEDURE — G8427 DOCREV CUR MEDS BY ELIG CLIN: HCPCS | Performed by: INTERNAL MEDICINE

## 2025-02-12 PROCEDURE — 3078F DIAST BP <80 MM HG: CPT | Performed by: INTERNAL MEDICINE

## 2025-02-12 PROCEDURE — 1036F TOBACCO NON-USER: CPT | Performed by: INTERNAL MEDICINE

## 2025-02-12 PROCEDURE — 1123F ACP DISCUSS/DSCN MKR DOCD: CPT | Performed by: INTERNAL MEDICINE

## 2025-02-12 RX ORDER — LOSARTAN POTASSIUM 50 MG/1
50 TABLET ORAL 2 TIMES DAILY
Qty: 180 TABLET | Refills: 3 | Status: SHIPPED | OUTPATIENT
Start: 2025-02-12

## 2025-02-12 RX ORDER — PANTOPRAZOLE SODIUM 40 MG/1
40 TABLET, DELAYED RELEASE ORAL DAILY
COMMUNITY
Start: 2024-12-11

## 2025-02-12 RX ORDER — CARBIDOPA/LEVODOPA 10MG-100MG
TABLET ORAL
COMMUNITY
Start: 2025-02-03

## 2025-02-12 NOTE — PROGRESS NOTES
Andrei Poli  1945  Date of Service: 2025    Patient Active Problem List    Diagnosis Date Noted    Dizziness 2022     Priority: Medium    Acute chest pain 2024    Lumbar stenosis with neurogenic claudication 2022    Lumbar disc herniation with radiculopathy 2021    Type 2 diabetes mellitus without complication, without long-term current use of insulin ([facility]) 2021    Primary insomnia 2020    Carotid atherosclerosis, bilateral 2020    Dupuytren's disease of palm of left hand 2020    Benign prostatic hyperplasia without lower urinary tract symptoms 2019    Lumbar disc disease 2017    Status post placement of implantable loop recorder 10/25/2016    PFO (patent foramen ovale) 10/11/2016    Hx of CABG 10/10/2016    Essential hypertension 10/10/2016    Mixed hyperlipidemia 10/10/2016    Carotid artery stenosis, asymptomatic, bilateral 2016    Coronary artery disease involving native coronary artery of native heart without angina pectoris 2016     Overview Note:     4-16 LM stenosis  CABG  LIMA - LAD  SVG - OM  SVG - Ramus         Social History     Socioeconomic History    Marital status:      Spouse name: None    Number of children: None    Years of education: None    Highest education level: None   Occupational History    Occupation: - retired   Tobacco Use    Smoking status: Former     Current packs/day: 0.00     Average packs/day: 1 pack/day for 56.3 years (56.3 ttl pk-yrs)     Types: Cigarettes     Start date:      Quit date: 2016     Years since quittin.8    Smokeless tobacco: Never   Vaping Use    Vaping status: Never Used    Passive vaping exposure: Yes   Substance and Sexual Activity    Alcohol use: Never    Drug use: No    Sexual activity: Not Currently     Partners: Female     Social Determinants of Health     Financial Resource Strain: Low Risk  (2022)    Overall Financial Resource

## 2025-02-17 ENCOUNTER — TELEPHONE (OUTPATIENT)
Dept: CARDIOLOGY | Age: 80
End: 2025-02-17

## 2025-02-17 NOTE — TELEPHONE ENCOUNTER
Called patient and l/m to schedule echo    Electronically signed by Madalyn Lara on 2/17/2025 at 10:07 AM

## 2025-02-19 ENCOUNTER — HOSPITAL ENCOUNTER (OUTPATIENT)
Age: 80
Discharge: HOME | End: 2025-02-19
Payer: MEDICARE

## 2025-02-19 DIAGNOSIS — R06.02 SHORTNESS OF BREATH: ICD-10-CM

## 2025-02-19 LAB
ANION GAP SERPL CALCULATED.3IONS-SCNC: 9 MMOL/L (ref 7–16)
BUN SERPL-MCNC: 15 MG/DL (ref 6–23)
CALCIUM SERPL-MCNC: 9.4 MG/DL (ref 8.6–10.2)
CHLORIDE SERPL-SCNC: 105 MMOL/L (ref 98–107)
CO2 SERPL-SCNC: 28 MMOL/L (ref 22–29)
CREAT SERPL-MCNC: 1.3 MG/DL (ref 0.7–1.2)
GFR, ESTIMATED: 55 ML/MIN/1.73M2
GLUCOSE SERPL-MCNC: 145 MG/DL (ref 74–99)
POTASSIUM SERPL-SCNC: 4.3 MMOL/L (ref 3.5–5)
SODIUM SERPL-SCNC: 142 MMOL/L (ref 132–146)

## 2025-02-19 PROCEDURE — 36415 COLL VENOUS BLD VENIPUNCTURE: CPT

## 2025-02-19 PROCEDURE — 80048 BASIC METABOLIC PNL TOTAL CA: CPT

## 2025-02-26 ENCOUNTER — NURSE ONLY (OUTPATIENT)
Dept: CARDIOLOGY CLINIC | Age: 80
End: 2025-02-26

## 2025-02-26 ENCOUNTER — TELEPHONE (OUTPATIENT)
Dept: CARDIOLOGY CLINIC | Age: 80
End: 2025-02-26

## 2025-02-26 NOTE — TELEPHONE ENCOUNTER
Patient was seen in the office today for a blood pressure check per      Standing BP:128/62  Standing P:63  Sitting BP:122/62  Sitting P:67     He brought in his machine, its reading higher  146/81   P 57            Electronically signed by Fariha Walsh at 2/26/2025  9:00 AM    Losartan increased to 50 mg BID on 2/12/25.

## 2025-02-26 NOTE — PROGRESS NOTES
Patient was seen in the office today for a blood pressure check per     Standing BP:128/62  Standing P:63  Sitting BP:122/62  Sitting P:67    He brought in his machine, its reading higher  146/81   P 57

## 2025-02-26 NOTE — TELEPHONE ENCOUNTER
Let him know that these are good.  Did they measure the blood pressure with his machine and with ours on the same arm?  If they did not please have them do that.  If they did then let him know that his machine is running high.

## 2025-02-27 NOTE — TELEPHONE ENCOUNTER
Patient's daughter (Kelsey) notified of Dr. Doss's assessment/recommendation.  Daughter states patient's machine was checked in the opposite arm.  He is scheduled for an echo on 3/6/25, we will check his BP/P after his echo.

## 2025-03-06 ENCOUNTER — HOSPITAL ENCOUNTER (OUTPATIENT)
Dept: CARDIOLOGY | Age: 80
Discharge: HOME | End: 2025-03-08
Attending: INTERNAL MEDICINE
Payer: MEDICARE

## 2025-03-06 ENCOUNTER — NURSE ONLY (OUTPATIENT)
Dept: CARDIOLOGY CLINIC | Age: 80
End: 2025-03-06

## 2025-03-06 ENCOUNTER — TELEPHONE (OUTPATIENT)
Dept: CARDIOLOGY CLINIC | Age: 80
End: 2025-03-06

## 2025-03-06 VITALS
DIASTOLIC BLOOD PRESSURE: 60 MMHG | SYSTOLIC BLOOD PRESSURE: 142 MMHG | WEIGHT: 139 LBS | BODY MASS INDEX: 22.34 KG/M2 | HEIGHT: 66 IN

## 2025-03-06 DIAGNOSIS — R06.02 SHORTNESS OF BREATH: ICD-10-CM

## 2025-03-06 DIAGNOSIS — I10 ESSENTIAL HYPERTENSION: Primary | ICD-10-CM

## 2025-03-06 PROCEDURE — 93306 TTE W/DOPPLER COMPLETE: CPT

## 2025-03-06 NOTE — TELEPHONE ENCOUNTER
Pt was in for bp check today.    Patient was in for a blood pressure check per Dr. Romario Scott bp   Sitting BP:166/68  Sitting Pulse:57     Machine bp  Sitting bp 178/82  Sitting pulse 52     Tyler bp   Standing BP:170/80  Standing Pulse:57     Machine bp  Standing bp 169/82  Standing pluse 55     Right arm   Marisol becerra ma

## 2025-03-06 NOTE — PROGRESS NOTES
Patient was in for a blood pressure check per Dr. Romario Scott bp   Sitting BP:166/68  Sitting Pulse:57    Machine bp  Sitting bp 178/82  Sitting pulse 52    Tyler bp   Standing BP:170/80  Standing Pulse:57    Machine bp  Standing bp 169/82  Standing pluse 55    Right arm   Marisol becerra ma

## 2025-03-07 RX ORDER — AMLODIPINE BESYLATE 2.5 MG/1
2.5 TABLET ORAL DAILY
Qty: 30 TABLET | Refills: 5 | Status: SHIPPED | OUTPATIENT
Start: 2025-03-07

## 2025-03-07 RX ORDER — AMLODIPINE BESYLATE 2.5 MG/1
2.5 TABLET ORAL DAILY
COMMUNITY
End: 2025-03-07 | Stop reason: SDUPTHER

## 2025-03-07 NOTE — TELEPHONE ENCOUNTER
His machine is accurate.  Please add Norvasc 2.5 mg daily.  Blood pressure and heart rate check in 2 weeks.

## 2025-03-07 NOTE — TELEPHONE ENCOUNTER
Patient notified of Dr. Doss's recommendations to add Norvasc 2.5 mg daily and scheduled for  2 wk BP and heart rate check on 3/21/23 script e-scribed and chart reflects changes.

## 2025-03-08 LAB
ECHO AV AREA PEAK VELOCITY: 2.2 CM2
ECHO AV AREA VTI: 2.6 CM2
ECHO AV AREA/BSA PEAK VELOCITY: 1.3 CM2/M2
ECHO AV AREA/BSA VTI: 1.5 CM2/M2
ECHO AV CUSP MM: 1.8 CM
ECHO AV MEAN GRADIENT: 2 MMHG
ECHO AV MEAN VELOCITY: 0.7 M/S
ECHO AV PEAK GRADIENT: 5 MMHG
ECHO AV PEAK VELOCITY: 1.2 M/S
ECHO AV VELOCITY RATIO: 0.67
ECHO AV VTI: 24.8 CM
ECHO BSA: 1.71 M2
ECHO EST RA PRESSURE: 3 MMHG
ECHO LA DIAMETER INDEX: 2.22 CM/M2
ECHO LA DIAMETER: 3.8 CM
ECHO LA VOL A-L A2C: 29 ML (ref 18–58)
ECHO LA VOL A-L A4C: 33 ML (ref 18–58)
ECHO LA VOL MOD A2C: 28 ML (ref 18–58)
ECHO LA VOL MOD A4C: 29 ML (ref 18–58)
ECHO LA VOLUME AREA LENGTH: 31 ML
ECHO LA VOLUME INDEX A-L A2C: 17 ML/M2 (ref 16–34)
ECHO LA VOLUME INDEX A-L A4C: 19 ML/M2 (ref 16–34)
ECHO LA VOLUME INDEX AREA LENGTH: 18 ML/M2 (ref 16–34)
ECHO LA VOLUME INDEX MOD A2C: 16 ML/M2 (ref 16–34)
ECHO LA VOLUME INDEX MOD A4C: 17 ML/M2 (ref 16–34)
ECHO LV EF PHYSICIAN: 60 %
ECHO LV FRACTIONAL SHORTENING: 29 % (ref 28–44)
ECHO LV INTERNAL DIMENSION DIASTOLE INDEX: 2.22 CM/M2
ECHO LV INTERNAL DIMENSION DIASTOLIC: 3.8 CM (ref 4.2–5.9)
ECHO LV INTERNAL DIMENSION SYSTOLIC INDEX: 1.58 CM/M2
ECHO LV INTERNAL DIMENSION SYSTOLIC: 2.7 CM
ECHO LV ISOVOLUMETRIC RELAXATION TIME (IVRT): 83 MS
ECHO LV IVSD: 1.5 CM (ref 0.6–1)
ECHO LV IVSS: 1.3 CM
ECHO LV MASS 2D: 173.1 G (ref 88–224)
ECHO LV MASS INDEX 2D: 101.2 G/M2 (ref 49–115)
ECHO LV POSTERIOR WALL DIASTOLIC: 1.1 CM (ref 0.6–1)
ECHO LV POSTERIOR WALL SYSTOLIC: 1.3 CM
ECHO LV RELATIVE WALL THICKNESS RATIO: 0.58
ECHO LVOT AREA: 3.1 CM2
ECHO LVOT AV VTI INDEX: 0.81
ECHO LVOT DIAM: 2 CM
ECHO LVOT MEAN GRADIENT: 1 MMHG
ECHO LVOT PEAK GRADIENT: 2 MMHG
ECHO LVOT PEAK VELOCITY: 0.8 M/S
ECHO LVOT STROKE VOLUME INDEX: 36.7 ML/M2
ECHO LVOT SV: 62.8 ML
ECHO LVOT VTI: 20 CM
ECHO MV "A" WAVE DURATION: 117.7 MSEC
ECHO MV A VELOCITY: 1.04 M/S
ECHO MV AREA PHT: 2.9 CM2
ECHO MV AREA VTI: 1.8 CM2
ECHO MV E DECELERATION TIME (DT): 258.3 MS
ECHO MV E VELOCITY: 0.85 M/S
ECHO MV E/A RATIO: 0.82
ECHO MV LVOT VTI INDEX: 1.77
ECHO MV MAX VELOCITY: 1.2 M/S
ECHO MV MEAN GRADIENT: 1 MMHG
ECHO MV MEAN VELOCITY: 0.5 M/S
ECHO MV PEAK GRADIENT: 6 MMHG
ECHO MV PRESSURE HALF TIME (PHT): 74.9 MS
ECHO MV VTI: 35.4 CM
ECHO PV MAX VELOCITY: 1.2 M/S
ECHO PV MEAN GRADIENT: 3 MMHG
ECHO PV MEAN VELOCITY: 0.8 M/S
ECHO PV PEAK GRADIENT: 5 MMHG
ECHO PV VTI: 24.4 CM
ECHO RIGHT VENTRICULAR SYSTOLIC PRESSURE (RVSP): 29 MMHG
ECHO TV REGURGITANT MAX VELOCITY: 2.53 M/S
ECHO TV REGURGITANT PEAK GRADIENT: 26 MMHG

## 2025-03-10 ENCOUNTER — RESULTS FOLLOW-UP (OUTPATIENT)
Dept: CARDIOLOGY | Age: 80
End: 2025-03-10

## 2025-03-11 NOTE — TELEPHONE ENCOUNTER
----- Message from Dr. Richardson Doss, DO sent at 3/10/2025  4:30 PM EDT -----  Let him know that the heart function and valves are okay.  There is nothing here to explain shortness of breath.  Follow-up with PCP and/or pulmonology to evaluate for other causes.

## 2025-03-26 ENCOUNTER — TELEPHONE (OUTPATIENT)
Dept: CARDIOLOGY CLINIC | Age: 80
End: 2025-03-26

## 2025-03-26 ENCOUNTER — LAB (OUTPATIENT)
Dept: CARDIOLOGY CLINIC | Age: 80
End: 2025-03-26

## 2025-03-26 NOTE — PROGRESS NOTES
Patient was seen in the office today for a blood pressure check per Dr Doss    Standing BP:120/60  Standing P:55  Sitting BP:122/60  Sitting P:54

## 2025-03-26 NOTE — TELEPHONE ENCOUNTER
Letitia Ken MA   Medical Assistant     Progress Notes      Sign when Signing Visit     Encounter Date: 3/26/2025       Patient was seen in the office today for a blood pressure check per Dr Doss     Standing BP:120/60  Standing P:55  Sitting BP:122/60  Sitting P:54          Norvasc 2.5 mg daily added on 3/6/25.

## 2025-04-06 ENCOUNTER — HOSPITAL ENCOUNTER (EMERGENCY)
Age: 80
Discharge: HOME | End: 2025-04-06
Attending: EMERGENCY MEDICINE
Payer: MEDICARE

## 2025-04-06 ENCOUNTER — APPOINTMENT (OUTPATIENT)
Dept: CT IMAGING | Age: 80
End: 2025-04-06
Payer: MEDICARE

## 2025-04-06 VITALS
RESPIRATION RATE: 15 BRPM | HEIGHT: 66 IN | BODY MASS INDEX: 22.18 KG/M2 | OXYGEN SATURATION: 98 % | TEMPERATURE: 97.6 F | SYSTOLIC BLOOD PRESSURE: 146 MMHG | DIASTOLIC BLOOD PRESSURE: 74 MMHG | HEART RATE: 59 BPM | WEIGHT: 138 LBS

## 2025-04-06 DIAGNOSIS — K59.00 CONSTIPATION, UNSPECIFIED CONSTIPATION TYPE: ICD-10-CM

## 2025-04-06 DIAGNOSIS — R10.30 LOWER ABDOMINAL PAIN: Primary | ICD-10-CM

## 2025-04-06 DIAGNOSIS — R10.9 BILATERAL FLANK PAIN: ICD-10-CM

## 2025-04-06 DIAGNOSIS — R30.0 DYSURIA: ICD-10-CM

## 2025-04-06 DIAGNOSIS — M54.50 LUMBAR PAIN: ICD-10-CM

## 2025-04-06 DIAGNOSIS — M54.2 NECK PAIN ON LEFT SIDE: ICD-10-CM

## 2025-04-06 LAB
ALBUMIN SERPL-MCNC: 3.6 G/DL (ref 3.5–5.2)
ALP SERPL-CCNC: 122 U/L (ref 40–129)
ALT SERPL-CCNC: 27 U/L (ref 0–40)
ANION GAP SERPL CALCULATED.3IONS-SCNC: 10 MMOL/L (ref 7–16)
AST SERPL-CCNC: 24 U/L (ref 0–39)
BASOPHILS # BLD: 0.06 K/UL (ref 0–0.2)
BASOPHILS NFR BLD: 1 % (ref 0–2)
BILIRUB SERPL-MCNC: 0.8 MG/DL (ref 0–1.2)
BILIRUB UR QL STRIP: NEGATIVE
BUN SERPL-MCNC: 14 MG/DL (ref 6–23)
CALCIUM SERPL-MCNC: 9.3 MG/DL (ref 8.6–10.2)
CHLORIDE SERPL-SCNC: 104 MMOL/L (ref 98–107)
CLARITY UR: CLEAR
CO2 SERPL-SCNC: 24 MMOL/L (ref 22–29)
COLOR UR: YELLOW
CREAT SERPL-MCNC: 1.2 MG/DL (ref 0.7–1.2)
EOSINOPHIL # BLD: 0.37 K/UL (ref 0.05–0.5)
EOSINOPHILS RELATIVE PERCENT: 4 % (ref 0–6)
ERYTHROCYTE [DISTWIDTH] IN BLOOD BY AUTOMATED COUNT: 14 % (ref 11.5–15)
GFR, ESTIMATED: 62 ML/MIN/1.73M2
GLUCOSE SERPL-MCNC: 151 MG/DL (ref 74–99)
GLUCOSE UR STRIP-MCNC: >=1000 MG/DL
HCT VFR BLD AUTO: 46.6 % (ref 37–54)
HGB BLD-MCNC: 14.9 G/DL (ref 12.5–16.5)
HGB UR QL STRIP.AUTO: ABNORMAL
IMM GRANULOCYTES # BLD AUTO: 0.03 K/UL (ref 0–0.58)
IMM GRANULOCYTES NFR BLD: 0 % (ref 0–5)
KETONES UR STRIP-MCNC: NEGATIVE MG/DL
LEUKOCYTE ESTERASE UR QL STRIP: NEGATIVE
LIPASE SERPL-CCNC: 14 U/L (ref 13–60)
LYMPHOCYTES NFR BLD: 1.92 K/UL (ref 1.5–4)
LYMPHOCYTES RELATIVE PERCENT: 20 % (ref 20–42)
MCH RBC QN AUTO: 31.2 PG (ref 26–35)
MCHC RBC AUTO-ENTMCNC: 32 G/DL (ref 32–34.5)
MCV RBC AUTO: 97.5 FL (ref 80–99.9)
MONOCYTES NFR BLD: 1.01 K/UL (ref 0.1–0.95)
MONOCYTES NFR BLD: 11 % (ref 2–12)
NEUTROPHILS NFR BLD: 64 % (ref 43–80)
NEUTS SEG NFR BLD: 6.09 K/UL (ref 1.8–7.3)
NITRITE UR QL STRIP: NEGATIVE
PH UR STRIP: 6 [PH] (ref 5–8)
PLATELET # BLD AUTO: 206 K/UL (ref 130–450)
PMV BLD AUTO: 11.8 FL (ref 7–12)
POTASSIUM SERPL-SCNC: 4 MMOL/L (ref 3.5–5)
PROT SERPL-MCNC: 6.7 G/DL (ref 6.4–8.3)
PROT UR STRIP-MCNC: NEGATIVE MG/DL
RBC # BLD AUTO: 4.78 M/UL (ref 3.8–5.8)
RBC #/AREA URNS HPF: ABNORMAL /HPF
SODIUM SERPL-SCNC: 138 MMOL/L (ref 132–146)
SP GR UR STRIP: 1.01 (ref 1–1.03)
UROBILINOGEN UR STRIP-ACNC: 0.2 EU/DL (ref 0–1)
WBC #/AREA URNS HPF: ABNORMAL /HPF
WBC OTHER # BLD: 9.5 K/UL (ref 4.5–11.5)

## 2025-04-06 PROCEDURE — 6370000000 HC RX 637 (ALT 250 FOR IP): Performed by: EMERGENCY MEDICINE

## 2025-04-06 PROCEDURE — 99285 EMERGENCY DEPT VISIT HI MDM: CPT

## 2025-04-06 PROCEDURE — 80053 COMPREHEN METABOLIC PANEL: CPT

## 2025-04-06 PROCEDURE — 74177 CT ABD & PELVIS W/CONTRAST: CPT

## 2025-04-06 PROCEDURE — 87086 URINE CULTURE/COLONY COUNT: CPT

## 2025-04-06 PROCEDURE — 85025 COMPLETE CBC W/AUTO DIFF WBC: CPT

## 2025-04-06 PROCEDURE — 70491 CT SOFT TISSUE NECK W/DYE: CPT

## 2025-04-06 PROCEDURE — 72131 CT LUMBAR SPINE W/O DYE: CPT

## 2025-04-06 PROCEDURE — 6360000004 HC RX CONTRAST MEDICATION: Performed by: RADIOLOGY

## 2025-04-06 PROCEDURE — 81001 URINALYSIS AUTO W/SCOPE: CPT

## 2025-04-06 PROCEDURE — 83690 ASSAY OF LIPASE: CPT

## 2025-04-06 RX ORDER — POLYETHYLENE GLYCOL 3350 17 G/17G
17 POWDER, FOR SOLUTION ORAL DAILY PRN
Qty: 30 PACKET | Refills: 0 | Status: ON HOLD | OUTPATIENT
Start: 2025-04-06 | End: 2025-05-06

## 2025-04-06 RX ORDER — IOPAMIDOL 755 MG/ML
75 INJECTION, SOLUTION INTRAVASCULAR
Status: COMPLETED | OUTPATIENT
Start: 2025-04-06 | End: 2025-04-06

## 2025-04-06 RX ORDER — TRAMADOL HYDROCHLORIDE 50 MG/1
50 TABLET ORAL ONCE
Status: COMPLETED | OUTPATIENT
Start: 2025-04-06 | End: 2025-04-06

## 2025-04-06 RX ORDER — CEFDINIR 300 MG/1
300 CAPSULE ORAL 2 TIMES DAILY
Qty: 14 CAPSULE | Refills: 0 | Status: ON HOLD | OUTPATIENT
Start: 2025-04-06 | End: 2025-04-13

## 2025-04-06 RX ORDER — AMOXICILLIN 250 MG
1 CAPSULE ORAL DAILY PRN
Qty: 30 TABLET | Refills: 0 | Status: ON HOLD | OUTPATIENT
Start: 2025-04-06

## 2025-04-06 RX ADMIN — TRAMADOL HYDROCHLORIDE 50 MG: 50 TABLET, COATED ORAL at 13:51

## 2025-04-06 RX ADMIN — IOPAMIDOL 75 ML: 755 INJECTION, SOLUTION INTRAVENOUS at 10:57

## 2025-04-06 ASSESSMENT — PAIN SCALES - GENERAL
PAINLEVEL_OUTOF10: 9
PAINLEVEL_OUTOF10: 10

## 2025-04-06 ASSESSMENT — LIFESTYLE VARIABLES
HOW OFTEN DO YOU HAVE A DRINK CONTAINING ALCOHOL: MONTHLY OR LESS
HOW MANY STANDARD DRINKS CONTAINING ALCOHOL DO YOU HAVE ON A TYPICAL DAY: 1 OR 2

## 2025-04-06 NOTE — ED PROVIDER NOTES
rigidity.  There is some bilateral CVA tenderness that is mild.  Musculoskeletal: Mild bilateral lower lumbar paraspinal back pain on palpation.  No obvious deformities.  No obvious overlying skin changes.  Moves all extremities x 4.  Extremities are warm and well perfused, no clubbing, no cyanosis, no edema. Capillary refill <3 seconds.  Distal pulses intact in bilateral upper and lower extremities.  Integument: skin warm and dry. No rashes.   Neurologic: no focal deficits, CN II-XII grossly intact. Symmetric strength 5/5 in the upper and lower extremities bilaterally  Psychiatric: Normal Affect      DIAGNOSTIC RESULTS   LABS:    Labs Reviewed   CBC WITH AUTO DIFFERENTIAL - Abnormal; Notable for the following components:       Result Value    Monocytes Absolute 1.01 (*)     All other components within normal limits   COMPREHENSIVE METABOLIC PANEL - Abnormal; Notable for the following components:    Glucose 151 (*)     All other components within normal limits   URINALYSIS WITH MICROSCOPIC - Abnormal; Notable for the following components:    Glucose, Ur >=1000 (*)     Urine Hgb SMALL (*)     RBC, UA 3 to 5 (*)     All other components within normal limits   CULTURE, URINE   LIPASE       As interpreted by me, the above displayed labs are abnormal. All other labs obtained during this visit were within normal range or not returned as of this dictation.      EKG Interpretation  Interpreted by emergency department physician, Dandre Zavala DO    N/A          RADIOLOGY:   Non-plain film images such as CT, Ultrasound and MRI are read by the radiologist. Plain radiographic images are visualized and preliminarily interpreted by the ED Provider with the below findings:        CT soft tissue neck with contrast: No acute abnormality the soft tissue structures of the neck    CT lumbar spine without contrast: No signs of acute fracture or spondylolisthesis.    CT abdomen pelvis with IV contrast: No signs of urolithiasis or

## 2025-04-06 NOTE — DISCHARGE INSTRUCTIONS
Call family doctor and specialist to schedule an appointment    Have your doctor obtain copies of all results and records and re-review them with you    Please take your papers with you to all followup appointments    If symptoms reoccur or worsen or if you believe you need emergency services for any other reason return to [facility]  CT ABDOMEN PELVIS W IV CONTRAST Additional Contrast? None   Final Result   1. No signs of urolithiasis or obstructive uropathy.   2. Diverticulosis without signs of diverticulitis.   3. Moderate to large volume of retained fecal debris within the colon.   4. Stable left adrenal adenoma.         CT SOFT TISSUE NECK W CONTRAST   Final Result   No acute abnormality of the soft tissue structures of the neck.         CT LUMBAR SPINE WO CONTRAST   Final Result   1. No signs of acute fracture or spondylolisthesis.   2. Status post laminectomy with interbody fusion at L5-S1.   3. No signs of recurrent disc herniation or spinal stenosis.

## 2025-04-07 ENCOUNTER — RESULTS FOLLOW-UP (OUTPATIENT)
Dept: EMERGENCY DEPT | Age: 80
End: 2025-04-07

## 2025-04-07 LAB
MICROORGANISM SPEC CULT: ABNORMAL
SERVICE CMNT-IMP: ABNORMAL
SPECIMEN DESCRIPTION: ABNORMAL

## 2025-04-10 ENCOUNTER — HOSPITAL ENCOUNTER (EMERGENCY)
Age: 80
Discharge: ANOTHER HOSPITAL | End: 2025-04-11
Attending: EMERGENCY MEDICINE
Payer: MEDICARE

## 2025-04-10 ENCOUNTER — APPOINTMENT (OUTPATIENT)
Dept: GENERAL RADIOLOGY | Age: 80
End: 2025-04-10
Payer: MEDICARE

## 2025-04-10 ENCOUNTER — HOSPITAL ENCOUNTER (EMERGENCY)
Age: 80
Discharge: LEFT BEFORE DISCHARGE | End: 2025-04-10

## 2025-04-10 DIAGNOSIS — R07.9 CHEST PAIN, UNSPECIFIED TYPE: Primary | ICD-10-CM

## 2025-04-10 LAB
ALBUMIN SERPL-MCNC: 3.9 G/DL (ref 3.5–5.2)
ALP SERPL-CCNC: 131 U/L (ref 40–129)
ALT SERPL-CCNC: 20 U/L (ref 0–40)
ANION GAP SERPL CALCULATED.3IONS-SCNC: 10 MMOL/L (ref 7–16)
AST SERPL-CCNC: 19 U/L (ref 0–39)
BASOPHILS # BLD: 0.05 K/UL (ref 0–0.2)
BASOPHILS NFR BLD: 1 % (ref 0–2)
BILIRUB SERPL-MCNC: 0.7 MG/DL (ref 0–1.2)
BILIRUB UR QL STRIP: NEGATIVE
BUN SERPL-MCNC: 14 MG/DL (ref 6–23)
CALCIUM SERPL-MCNC: 9.9 MG/DL (ref 8.6–10.2)
CHLORIDE SERPL-SCNC: 105 MMOL/L (ref 98–107)
CLARITY UR: CLEAR
CO2 SERPL-SCNC: 25 MMOL/L (ref 22–29)
COLOR UR: YELLOW
CREAT SERPL-MCNC: 1.1 MG/DL (ref 0.7–1.2)
D-DIMER QUANTITATIVE: 257 NG/ML DDU (ref 0–230)
EKG ATRIAL RATE: 60 BPM
EKG P AXIS: 57 DEGREES
EKG P-R INTERVAL: 142 MS
EKG Q-T INTERVAL: 398 MS
EKG QRS DURATION: 96 MS
EKG QTC CALCULATION (BAZETT): 398 MS
EKG R AXIS: 41 DEGREES
EKG T AXIS: 27 DEGREES
EKG VENTRICULAR RATE: 60 BPM
EOSINOPHIL # BLD: 0.32 K/UL (ref 0.05–0.5)
EOSINOPHILS RELATIVE PERCENT: 3 % (ref 0–6)
ERYTHROCYTE [DISTWIDTH] IN BLOOD BY AUTOMATED COUNT: 13.6 % (ref 11.5–15)
GFR, ESTIMATED: 66 ML/MIN/1.73M2
GLUCOSE SERPL-MCNC: 104 MG/DL (ref 74–99)
GLUCOSE UR STRIP-MCNC: >=1000 MG/DL
HCT VFR BLD AUTO: 49.4 % (ref 37–54)
HGB BLD-MCNC: 16.8 G/DL (ref 12.5–16.5)
HGB UR QL STRIP.AUTO: ABNORMAL
IMM GRANULOCYTES # BLD AUTO: 0.03 K/UL (ref 0–0.58)
IMM GRANULOCYTES NFR BLD: 0 % (ref 0–5)
KETONES UR STRIP-MCNC: NEGATIVE MG/DL
LACTATE BLDV-SCNC: 1.8 MMOL/L (ref 0.5–2.2)
LEUKOCYTE ESTERASE UR QL STRIP: NEGATIVE
LIPASE SERPL-CCNC: 14 U/L (ref 13–60)
LYMPHOCYTES NFR BLD: 1.63 K/UL (ref 1.5–4)
LYMPHOCYTES RELATIVE PERCENT: 17 % (ref 20–42)
MCH RBC QN AUTO: 31.2 PG (ref 26–35)
MCHC RBC AUTO-ENTMCNC: 34 G/DL (ref 32–34.5)
MCV RBC AUTO: 91.7 FL (ref 80–99.9)
MONOCYTES NFR BLD: 1.02 K/UL (ref 0.1–0.95)
MONOCYTES NFR BLD: 10 % (ref 2–12)
NEUTROPHILS NFR BLD: 69 % (ref 43–80)
NEUTS SEG NFR BLD: 6.8 K/UL (ref 1.8–7.3)
NITRITE UR QL STRIP: NEGATIVE
PH UR STRIP: 6.5 [PH] (ref 5–8)
PLATELET # BLD AUTO: 211 K/UL (ref 130–450)
PMV BLD AUTO: 11.7 FL (ref 7–12)
POTASSIUM SERPL-SCNC: 4.3 MMOL/L (ref 3.5–5)
PROT SERPL-MCNC: 7.5 G/DL (ref 6.4–8.3)
PROT UR STRIP-MCNC: NEGATIVE MG/DL
RBC # BLD AUTO: 5.39 M/UL (ref 3.8–5.8)
RBC #/AREA URNS HPF: ABNORMAL /HPF
SODIUM SERPL-SCNC: 140 MMOL/L (ref 132–146)
SP GR UR STRIP: 1.01 (ref 1–1.03)
TROPONIN I SERPL HS-MCNC: 10 NG/L (ref 0–11)
TROPONIN I SERPL HS-MCNC: 8 NG/L (ref 0–11)
UROBILINOGEN UR STRIP-ACNC: 0.2 EU/DL (ref 0–1)
WBC #/AREA URNS HPF: ABNORMAL /HPF
WBC OTHER # BLD: 9.9 K/UL (ref 4.5–11.5)

## 2025-04-10 PROCEDURE — 83690 ASSAY OF LIPASE: CPT

## 2025-04-10 PROCEDURE — 84484 ASSAY OF TROPONIN QUANT: CPT

## 2025-04-10 PROCEDURE — 93010 ELECTROCARDIOGRAM REPORT: CPT | Performed by: INTERNAL MEDICINE

## 2025-04-10 PROCEDURE — 96374 THER/PROPH/DIAG INJ IV PUSH: CPT

## 2025-04-10 PROCEDURE — 80053 COMPREHEN METABOLIC PANEL: CPT

## 2025-04-10 PROCEDURE — 85379 FIBRIN DEGRADATION QUANT: CPT

## 2025-04-10 PROCEDURE — 6370000000 HC RX 637 (ALT 250 FOR IP): Performed by: EMERGENCY MEDICINE

## 2025-04-10 PROCEDURE — 71045 X-RAY EXAM CHEST 1 VIEW: CPT

## 2025-04-10 PROCEDURE — 93005 ELECTROCARDIOGRAM TRACING: CPT | Performed by: NURSE PRACTITIONER

## 2025-04-10 PROCEDURE — 83605 ASSAY OF LACTIC ACID: CPT

## 2025-04-10 PROCEDURE — 81001 URINALYSIS AUTO W/SCOPE: CPT

## 2025-04-10 PROCEDURE — 85025 COMPLETE CBC W/AUTO DIFF WBC: CPT

## 2025-04-10 PROCEDURE — 99285 EMERGENCY DEPT VISIT HI MDM: CPT

## 2025-04-10 PROCEDURE — 6360000002 HC RX W HCPCS: Performed by: EMERGENCY MEDICINE

## 2025-04-10 RX ORDER — FENTANYL CITRATE 50 UG/ML
50 INJECTION, SOLUTION INTRAMUSCULAR; INTRAVENOUS ONCE
Status: COMPLETED | OUTPATIENT
Start: 2025-04-10 | End: 2025-04-10

## 2025-04-10 RX ORDER — ASPIRIN 81 MG/1
324 TABLET, CHEWABLE ORAL ONCE
Status: COMPLETED | OUTPATIENT
Start: 2025-04-10 | End: 2025-04-10

## 2025-04-10 RX ORDER — NITROGLYCERIN 0.4 MG/1
0.4 TABLET SUBLINGUAL EVERY 5 MIN PRN
Status: DISCONTINUED | OUTPATIENT
Start: 2025-04-10 | End: 2025-04-11 | Stop reason: HOSPADM

## 2025-04-10 RX ADMIN — FENTANYL CITRATE 50 MCG: 50 INJECTION INTRAMUSCULAR; INTRAVENOUS at 17:40

## 2025-04-10 RX ADMIN — NITROGLYCERIN 0.4 MG: 0.4 TABLET, ORALLY DISINTEGRATING SUBLINGUAL at 17:18

## 2025-04-10 RX ADMIN — ASPIRIN 81 MG CHEWABLE TABLET 324 MG: 81 TABLET CHEWABLE at 18:52

## 2025-04-10 ASSESSMENT — PAIN DESCRIPTION - ORIENTATION
ORIENTATION: LEFT

## 2025-04-10 ASSESSMENT — PAIN DESCRIPTION - DESCRIPTORS
DESCRIPTORS: PRESSURE;DISCOMFORT
DESCRIPTORS: PRESSURE
DESCRIPTORS: PRESSURE
DESCRIPTORS: DISCOMFORT

## 2025-04-10 ASSESSMENT — ENCOUNTER SYMPTOMS
ABDOMINAL PAIN: 1
VOMITING: 0
EYE REDNESS: 0
NAUSEA: 0
SHORTNESS OF BREATH: 0

## 2025-04-10 ASSESSMENT — PAIN DESCRIPTION - LOCATION
LOCATION: CHEST

## 2025-04-10 ASSESSMENT — PAIN - FUNCTIONAL ASSESSMENT: PAIN_FUNCTIONAL_ASSESSMENT: 0-10

## 2025-04-10 ASSESSMENT — PAIN SCALES - GENERAL
PAINLEVEL_OUTOF10: 8
PAINLEVEL_OUTOF10: 7
PAINLEVEL_OUTOF10: 5
PAINLEVEL_OUTOF10: 5

## 2025-04-10 ASSESSMENT — PAIN DESCRIPTION - PAIN TYPE: TYPE: ACUTE PAIN

## 2025-04-10 ASSESSMENT — PAIN DESCRIPTION - ONSET: ONSET: SUDDEN

## 2025-04-10 ASSESSMENT — PAIN DESCRIPTION - FREQUENCY: FREQUENCY: CONTINUOUS

## 2025-04-10 NOTE — ED NOTES
Called patient for triage. Was approached by patient and family and was told they are going to different facility. Patient and family proceeded to leave ER.

## 2025-04-10 NOTE — ED PROVIDER NOTES
[facility] EMERGENCY DEPARTMENT  EMERGENCY DEPARTMENT ENCOUNTER        Pt Name: Andrei Ashton  MRN: [id number]  Birthdate 1945  Date of evaluation: 4/10/2025  Provider: Luh Daugherty DO  PCP: Fredis Mcmanus DO  Note Started: 4:33 PM EDT 4/10/25    CHIEF COMPLAINT       Chief Complaint   Patient presents with    Chest Pain     Left sided chest pain started around 8am.        HISTORY OF PRESENT ILLNESS: 1 or more Elements       Andrei Ashton is a 79 y.o. male who presents to the emergency department the chief complaint of left-sided pain and pressure.  The patient states he began with symptoms approximately 8 hours prior to arrival in the emergency department.  The pain is located on the left of the chest.  Describes a pressure sensation.  Waxes and wanes somewhat worse with exertion.  Nonradiating.  He does complain of some mild lower abdominal pain.  The patient does have a history of hypertension, hyperlipidemia diabetes as well as coronary artery disease. The patient has no history of DVT or PE, is not on any hormone replacement therapy, denies any active malignancy, recent surgeries or long periods of travel sitting in a car or plane.      Nursing Notes were all reviewed and agreed with or any disagreements were addressed in the HPI.    REVIEW OF SYSTEMS :      Review of Systems   Constitutional:  Negative for fever.   HENT:  Negative for congestion.    Eyes:  Negative for redness.   Respiratory:  Negative for shortness of breath.    Cardiovascular:  Positive for chest pain.   Gastrointestinal:  Positive for abdominal pain. Negative for nausea and vomiting.   Genitourinary:  Negative for dysuria.   Musculoskeletal:  Negative for arthralgias.   Skin:  Negative for rash.   Neurological:  Negative for dizziness.   Psychiatric/Behavioral:  Negative for confusion.    All other systems reviewed and are negative.      Positives and Pertinent negatives as per HPI.     SURGICAL HISTORY

## 2025-04-10 NOTE — ED NOTES
Nitroglycerin SL 0.4 mg tab dose 1 administered at 1720 /54  P 57, Pain 7 on 0-10 pain scale states Pressure. Nitroglycerin SL 0.4 mg tab dose 2 administered at 1725 /51  P 62, Pain 7 on 0-10 pain scale states Pressure. Nitroglycerin SL 0.4 mg tab dose 3 Held at 1730 BP 97/40    P 62, Pain 5 on 0-10 pain scale states Discomfort. Dr. Daugherty notified. Fluids 500 mL administered.

## 2025-04-11 ENCOUNTER — HOSPITAL ENCOUNTER (INPATIENT)
Age: 80
LOS: 3 days | Discharge: HOME | DRG: 313 | End: 2025-04-14
Attending: GENERAL PRACTICE | Admitting: GENERAL PRACTICE
Payer: MEDICARE

## 2025-04-11 VITALS
OXYGEN SATURATION: 96 % | DIASTOLIC BLOOD PRESSURE: 75 MMHG | HEART RATE: 60 BPM | RESPIRATION RATE: 16 BRPM | TEMPERATURE: 97.7 F | SYSTOLIC BLOOD PRESSURE: 170 MMHG

## 2025-04-11 DIAGNOSIS — R07.9 CHEST PAIN, UNSPECIFIED TYPE: Primary | ICD-10-CM

## 2025-04-11 LAB
BNP SERPL-MCNC: 98 PG/ML (ref 0–450)
EKG ATRIAL RATE: 66 BPM
EKG P AXIS: 60 DEGREES
EKG P-R INTERVAL: 148 MS
EKG Q-T INTERVAL: 404 MS
EKG QRS DURATION: 92 MS
EKG QTC CALCULATION (BAZETT): 423 MS
EKG R AXIS: 8 DEGREES
EKG T AXIS: 24 DEGREES
EKG VENTRICULAR RATE: 66 BPM
ERYTHROCYTE [SEDIMENTATION RATE] IN BLOOD BY WESTERGREN METHOD: 5 MM/HR (ref 0–15)
TROPONIN I SERPL HS-MCNC: 9 NG/L (ref 0–11)

## 2025-04-11 PROCEDURE — 6370000000 HC RX 637 (ALT 250 FOR IP): Performed by: GENERAL PRACTICE

## 2025-04-11 PROCEDURE — 93010 ELECTROCARDIOGRAM REPORT: CPT | Performed by: INTERNAL MEDICINE

## 2025-04-11 PROCEDURE — 83880 ASSAY OF NATRIURETIC PEPTIDE: CPT

## 2025-04-11 PROCEDURE — 84484 ASSAY OF TROPONIN QUANT: CPT

## 2025-04-11 PROCEDURE — 93005 ELECTROCARDIOGRAM TRACING: CPT | Performed by: GENERAL PRACTICE

## 2025-04-11 PROCEDURE — 1200000000 HC SEMI PRIVATE

## 2025-04-11 PROCEDURE — 6360000002 HC RX W HCPCS: Performed by: STUDENT IN AN ORGANIZED HEALTH CARE EDUCATION/TRAINING PROGRAM

## 2025-04-11 PROCEDURE — 96375 TX/PRO/DX INJ NEW DRUG ADDON: CPT

## 2025-04-11 PROCEDURE — 85652 RBC SED RATE AUTOMATED: CPT

## 2025-04-11 RX ORDER — CLOPIDOGREL BISULFATE 75 MG/1
75 TABLET ORAL DAILY
Status: DISCONTINUED | OUTPATIENT
Start: 2025-04-11 | End: 2025-04-14 | Stop reason: HOSPADM

## 2025-04-11 RX ORDER — LOSARTAN POTASSIUM 50 MG/1
50 TABLET ORAL 2 TIMES DAILY
Status: DISCONTINUED | OUTPATIENT
Start: 2025-04-11 | End: 2025-04-14 | Stop reason: HOSPADM

## 2025-04-11 RX ORDER — AMLODIPINE BESYLATE 2.5 MG/1
2.5 TABLET ORAL DAILY
Status: DISCONTINUED | OUTPATIENT
Start: 2025-04-11 | End: 2025-04-11

## 2025-04-11 RX ORDER — POLYETHYLENE GLYCOL 3350 17 G/17G
17 POWDER, FOR SOLUTION ORAL DAILY PRN
Status: DISCONTINUED | OUTPATIENT
Start: 2025-04-11 | End: 2025-04-14 | Stop reason: HOSPADM

## 2025-04-11 RX ORDER — SENNA AND DOCUSATE SODIUM 50; 8.6 MG/1; MG/1
1 TABLET, FILM COATED ORAL NIGHTLY PRN
Status: DISCONTINUED | OUTPATIENT
Start: 2025-04-11 | End: 2025-04-14 | Stop reason: HOSPADM

## 2025-04-11 RX ORDER — ONDANSETRON 2 MG/ML
4 INJECTION INTRAMUSCULAR; INTRAVENOUS EVERY 6 HOURS PRN
Status: DISCONTINUED | OUTPATIENT
Start: 2025-04-11 | End: 2025-04-14 | Stop reason: HOSPADM

## 2025-04-11 RX ORDER — ATORVASTATIN CALCIUM 40 MG/1
40 TABLET, FILM COATED ORAL DAILY
Status: DISCONTINUED | OUTPATIENT
Start: 2025-04-11 | End: 2025-04-14 | Stop reason: HOSPADM

## 2025-04-11 RX ORDER — TRAMADOL HYDROCHLORIDE 50 MG/1
50 TABLET ORAL EVERY 4 HOURS PRN
Status: DISCONTINUED | OUTPATIENT
Start: 2025-04-11 | End: 2025-04-14 | Stop reason: HOSPADM

## 2025-04-11 RX ORDER — CEFDINIR 300 MG/1
300 CAPSULE ORAL 2 TIMES DAILY
Status: DISCONTINUED | OUTPATIENT
Start: 2025-04-11 | End: 2025-04-11

## 2025-04-11 RX ORDER — AMLODIPINE BESYLATE 5 MG/1
5 TABLET ORAL DAILY
Status: DISCONTINUED | OUTPATIENT
Start: 2025-04-12 | End: 2025-04-14 | Stop reason: HOSPADM

## 2025-04-11 RX ORDER — GABAPENTIN 100 MG/1
200 CAPSULE ORAL 2 TIMES DAILY
Status: DISCONTINUED | OUTPATIENT
Start: 2025-04-11 | End: 2025-04-14 | Stop reason: HOSPADM

## 2025-04-11 RX ORDER — GABAPENTIN 300 MG/1
300 CAPSULE ORAL 2 TIMES DAILY
Status: DISCONTINUED | OUTPATIENT
Start: 2025-04-11 | End: 2025-04-11

## 2025-04-11 RX ORDER — PANTOPRAZOLE SODIUM 40 MG/1
40 TABLET, DELAYED RELEASE ORAL
Status: DISCONTINUED | OUTPATIENT
Start: 2025-04-11 | End: 2025-04-14 | Stop reason: HOSPADM

## 2025-04-11 RX ORDER — CARVEDILOL 3.12 MG/1
3.12 TABLET ORAL 2 TIMES DAILY WITH MEALS
Status: DISCONTINUED | OUTPATIENT
Start: 2025-04-11 | End: 2025-04-14 | Stop reason: HOSPADM

## 2025-04-11 RX ORDER — CARBIDOPA/LEVODOPA 10MG-100MG
1 TABLET ORAL NIGHTLY
Status: DISCONTINUED | OUTPATIENT
Start: 2025-04-11 | End: 2025-04-14 | Stop reason: HOSPADM

## 2025-04-11 RX ORDER — ZOLPIDEM TARTRATE 5 MG/1
10 TABLET ORAL NIGHTLY
Status: DISCONTINUED | OUTPATIENT
Start: 2025-04-11 | End: 2025-04-14 | Stop reason: HOSPADM

## 2025-04-11 RX ORDER — REGADENOSON 0.08 MG/ML
0.4 INJECTION, SOLUTION INTRAVENOUS
Status: CANCELLED | OUTPATIENT
Start: 2025-04-11

## 2025-04-11 RX ORDER — HYDRALAZINE HYDROCHLORIDE 20 MG/ML
5 INJECTION INTRAMUSCULAR; INTRAVENOUS ONCE
Status: COMPLETED | OUTPATIENT
Start: 2025-04-11 | End: 2025-04-11

## 2025-04-11 RX ADMIN — CARBIDOPA AND LEVODOPA 1 TABLET: 10; 100 TABLET ORAL at 21:20

## 2025-04-11 RX ADMIN — ATORVASTATIN CALCIUM 40 MG: 40 TABLET, FILM COATED ORAL at 08:43

## 2025-04-11 RX ADMIN — TRAMADOL HYDROCHLORIDE 50 MG: 50 TABLET, COATED ORAL at 21:19

## 2025-04-11 RX ADMIN — HYDRALAZINE HYDROCHLORIDE 5 MG: 20 INJECTION INTRAMUSCULAR; INTRAVENOUS at 00:11

## 2025-04-11 RX ADMIN — AMLODIPINE BESYLATE 2.5 MG: 2.5 TABLET ORAL at 08:43

## 2025-04-11 RX ADMIN — CEFDINIR 300 MG: 300 CAPSULE ORAL at 08:43

## 2025-04-11 RX ADMIN — ZOLPIDEM TARTRATE 10 MG: 5 TABLET ORAL at 21:20

## 2025-04-11 RX ADMIN — LOSARTAN POTASSIUM 50 MG: 50 TABLET, FILM COATED ORAL at 08:43

## 2025-04-11 RX ADMIN — CLOPIDOGREL BISULFATE 75 MG: 75 TABLET, FILM COATED ORAL at 08:43

## 2025-04-11 RX ADMIN — LOSARTAN POTASSIUM 50 MG: 50 TABLET, FILM COATED ORAL at 21:19

## 2025-04-11 RX ADMIN — CARVEDILOL 3.12 MG: 3.12 TABLET, FILM COATED ORAL at 16:27

## 2025-04-11 RX ADMIN — PANTOPRAZOLE SODIUM 40 MG: 40 TABLET, DELAYED RELEASE ORAL at 05:39

## 2025-04-11 RX ADMIN — TRAMADOL HYDROCHLORIDE 50 MG: 50 TABLET, COATED ORAL at 05:38

## 2025-04-11 RX ADMIN — GABAPENTIN 200 MG: 100 CAPSULE ORAL at 21:18

## 2025-04-11 RX ADMIN — CARVEDILOL 3.12 MG: 3.12 TABLET, FILM COATED ORAL at 08:43

## 2025-04-11 RX ADMIN — GABAPENTIN 300 MG: 300 CAPSULE ORAL at 08:43

## 2025-04-11 RX ADMIN — EMPAGLIFLOZIN 25 MG: 10 TABLET, FILM COATED ORAL at 08:43

## 2025-04-11 ASSESSMENT — PAIN DESCRIPTION - DESCRIPTORS
DESCRIPTORS: ACHING;DISCOMFORT;SORE
DESCRIPTORS: GNAWING
DESCRIPTORS: GNAWING

## 2025-04-11 ASSESSMENT — PAIN SCALES - GENERAL
PAINLEVEL_OUTOF10: 9
PAINLEVEL_OUTOF10: 8
PAINLEVEL_OUTOF10: 10

## 2025-04-11 ASSESSMENT — PAIN DESCRIPTION - LOCATION
LOCATION: BACK

## 2025-04-11 ASSESSMENT — PAIN DESCRIPTION - ORIENTATION
ORIENTATION: LEFT
ORIENTATION: LOWER;POSTERIOR

## 2025-04-11 ASSESSMENT — PAIN - FUNCTIONAL ASSESSMENT: PAIN_FUNCTIONAL_ASSESSMENT: PREVENTS OR INTERFERES SOME ACTIVE ACTIVITIES AND ADLS

## 2025-04-11 NOTE — ED NOTES
Room assignment [facility]. PAS ETA 3 to 4 hours, approx 1 to 2 am. NTN report [phone]

## 2025-04-11 NOTE — ACP (ADVANCE CARE PLANNING)
Advance Care Planning   Healthcare Decision Maker:    Primary Decision Maker: RichelleKelsey - Child - [phone]    Primary Decision Maker: Lorena Ashton - Spouse - [phone]    Secondary Decision Maker: Vivian Oviedo - Child - [phone]    Supplemental (Other) Decision Maker: Linda Ashtonh - Child - [phone]    Click here to complete Healthcare Decision Makers including selection of the Healthcare Decision Maker Relationship (ie \"Primary\").

## 2025-04-11 NOTE — H&P
[place] [address]                           HISTORY & PHYSICAL      PATIENT NAME: SALINA MYERS              : 1945  MED REC NO: [id number]                        ROOM: 0545  ACCOUNT NO: [id number]                       ADMIT DATE: 2025  PROVIDER: Fredis Mcmanus DO      REFERRING PHYSICIAN:  AMAURI HARRINGTON    HISTORY OF PRESENT ILLNESS:  The patient is an 80-year-old  male presented over into the [place] [facility] Department with a history of chief complaint of left-sided chest pain/pressure.  He has known history of coronary artery disease.  He has had previous coronary artery bypass graft surgery.  He also complains of chronic low back pain and this seems to be of a limiting factor for him as well.  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  He currently takes tramadol and gabapentin for this.  Also of note, his blood pressure was up about 180 systolically when he arrived over into the hospital here, so we are going to have to address that too, so we are going to bring him in with chest pain diagnosis, rule out acute coronary syndrome.  He has had a recent Lexiscan stress test back in 2024, which currently did not show too much to be concerned about, but nevertheless with his history, he needs to be followed along here and have Cardiology take a look at him again.    MEDICATIONS:  His recent current medications include amlodipine 2.5 daily, atorvastatin 40 daily, recently started on Sinemet 10/100 for restless legs syndrome, carvedilol 3.125 b.i.d., clopidogrel 75 b.i.d., Jardiance 25 daily, Neurontin 300 mg b.i.d., losartan 50 daily, pantoprazole 40 daily, MiraLAX packet daily, Senokot as needed, tramadol 50 every 4 hours as needed, and Ambien 10 at bedtime as

## 2025-04-11 NOTE — CONSULTS
CARDIOLOGY CONSULTATION    Patient Name:  Andrei Ashton    :  1945    Reason for Consultation:   Left chest discomfort.    History of Present Illness:   Andrei Ashton presents to [facility] following a recent history of upper respiratory infection in which she is had significant shortness of breath and coughing.  Apparently earlier today he noted the onset of chest discomfort while at rest and not specifically related with exertion.  He does have a known history of coronary artery disease having undergone previous coronary artery bypass surgery in 2016.  Until this time he has been physically active without exertional chest discomfort.  Past Medical History:   has a past medical history of Anxiety, Asymptomatic bilateral carotid artery stenosis, Bowel obstruction ([facility]), CAD (coronary artery disease), COPD (chronic obstructive pulmonary disease) ([facility]), Cryptogenic stroke ([facility]), Diabetes mellitus ([facility]), Hyperlipidemia, Hypertension, and NSTEMI (non-ST elevated myocardial infarction) ([facility]).    Surgical History:   has a past surgical history that includes colectomy; Cardiac catheterization (2016); Coronary artery bypass graft (16); Diagnostic Cardiac Cath Lab Procedure (2016); Insertable Cardiac Monitor (Left, 10/11/2016); back surgery; Insertable Cardiac Monitor (2021); Ankle fracture surgery (Left, ); and Lumbar spine surgery (N/A, 3/30/2022).     Social History:   reports that he quit smoking about 8 years ago. His smoking use included cigarettes. He started smoking about 65 years ago. He has a 56.3 pack-year smoking history. He has never used smokeless tobacco. He reports that he does not drink alcohol and does not use drugs.     Family History:  family history includes Coronary Art Dis in his mother; Heart Attack in his father; Heart Surgery in his brother and mother; Hypertension in his mother.     Medications:  Prior to Admission medications    Medication

## 2025-04-11 NOTE — PLAN OF CARE
Problem: Chronic Conditions and Co-morbidities  Goal: Patient's chronic conditions and co-morbidity symptoms are monitored and maintained or improved  4/11/2025 0920 by Livia Kraus RN  Outcome: Progressing  4/11/2025 0208 by Isela Alvarez RN  Outcome: Progressing     Problem: Discharge Planning  Goal: Discharge to home or other facility with appropriate resources  4/11/2025 0920 by Liiva Kraus RN  Outcome: Progressing  4/11/2025 0208 by Isela Alvarez RN  Outcome: Progressing     Problem: Pain  Goal: Verbalizes/displays adequate comfort level or baseline comfort level  4/11/2025 0208 by Isela Alvarez RN  Outcome: Progressing     Problem: Safety - Adult  Goal: Free from fall injury  4/11/2025 0208 by Isela Alvarez RN  Outcome: Progressing     Problem: ABCDS Injury Assessment  Goal: Absence of physical injury  4/11/2025 0208 by Isela Alvarez RN  Outcome: Progressing

## 2025-04-11 NOTE — CARE COORDINATION
Transition of Care-met with patient bedside, introduced myself and CM role in care coordination. Patient came to hospital d/t chest pain. Cardiology following-await plan. Patient reports independence with ADL's, he resides in a one story home with three entry steps with his wife. PCP is Dr. Mcmanus, preferred pharmacy is [facility] in [place]. No history of SNF, has used [facility] in the past. Discharge plan is home, daughter to transport, no voiced needs.    Rona MCDONNELLN, RN  [facility]

## 2025-04-12 PROCEDURE — 1200000000 HC SEMI PRIVATE

## 2025-04-12 PROCEDURE — 6370000000 HC RX 637 (ALT 250 FOR IP): Performed by: GENERAL PRACTICE

## 2025-04-12 RX ADMIN — TRAMADOL HYDROCHLORIDE 50 MG: 50 TABLET, COATED ORAL at 15:33

## 2025-04-12 RX ADMIN — CARBIDOPA AND LEVODOPA 1 TABLET: 10; 100 TABLET ORAL at 22:30

## 2025-04-12 RX ADMIN — PANTOPRAZOLE SODIUM 40 MG: 40 TABLET, DELAYED RELEASE ORAL at 07:06

## 2025-04-12 RX ADMIN — EMPAGLIFLOZIN 25 MG: 10 TABLET, FILM COATED ORAL at 09:26

## 2025-04-12 RX ADMIN — ZOLPIDEM TARTRATE 10 MG: 5 TABLET ORAL at 22:13

## 2025-04-12 RX ADMIN — CARVEDILOL 3.12 MG: 3.12 TABLET, FILM COATED ORAL at 09:24

## 2025-04-12 RX ADMIN — TRAMADOL HYDROCHLORIDE 50 MG: 50 TABLET, COATED ORAL at 22:14

## 2025-04-12 RX ADMIN — GABAPENTIN 200 MG: 100 CAPSULE ORAL at 22:14

## 2025-04-12 RX ADMIN — AMLODIPINE BESYLATE 5 MG: 5 TABLET ORAL at 09:24

## 2025-04-12 RX ADMIN — GABAPENTIN 200 MG: 100 CAPSULE ORAL at 09:26

## 2025-04-12 RX ADMIN — CARVEDILOL 3.12 MG: 3.12 TABLET, FILM COATED ORAL at 17:10

## 2025-04-12 RX ADMIN — CLOPIDOGREL BISULFATE 75 MG: 75 TABLET, FILM COATED ORAL at 09:26

## 2025-04-12 RX ADMIN — ATORVASTATIN CALCIUM 40 MG: 40 TABLET, FILM COATED ORAL at 09:24

## 2025-04-12 ASSESSMENT — PAIN DESCRIPTION - ONSET
ONSET: ON-GOING
ONSET: ON-GOING

## 2025-04-12 ASSESSMENT — PAIN - FUNCTIONAL ASSESSMENT
PAIN_FUNCTIONAL_ASSESSMENT: PREVENTS OR INTERFERES SOME ACTIVE ACTIVITIES AND ADLS
PAIN_FUNCTIONAL_ASSESSMENT: PREVENTS OR INTERFERES WITH MANY ACTIVE NOT PASSIVE ACTIVITIES

## 2025-04-12 ASSESSMENT — PAIN DESCRIPTION - DIRECTION: RADIATING_TOWARDS: LOWER BACK

## 2025-04-12 ASSESSMENT — PAIN DESCRIPTION - FREQUENCY
FREQUENCY: OTHER (COMMENT)
FREQUENCY: CONTINUOUS

## 2025-04-12 ASSESSMENT — PAIN SCALES - GENERAL
PAINLEVEL_OUTOF10: 10
PAINLEVEL_OUTOF10: 7
PAINLEVEL_OUTOF10: 6

## 2025-04-12 ASSESSMENT — PAIN DESCRIPTION - LOCATION
LOCATION: BACK

## 2025-04-12 ASSESSMENT — PAIN DESCRIPTION - PAIN TYPE
TYPE: CHRONIC PAIN
TYPE: CHRONIC PAIN;ACUTE PAIN

## 2025-04-12 ASSESSMENT — PAIN DESCRIPTION - ORIENTATION
ORIENTATION: LOWER

## 2025-04-12 ASSESSMENT — PAIN DESCRIPTION - DESCRIPTORS
DESCRIPTORS: SPASM;DISCOMFORT;SORE
DESCRIPTORS: ACHING

## 2025-04-13 ENCOUNTER — APPOINTMENT (OUTPATIENT)
Dept: MRI IMAGING | Age: 80
DRG: 313 | End: 2025-04-13
Attending: GENERAL PRACTICE
Payer: MEDICARE

## 2025-04-13 PROCEDURE — A9579 GAD-BASE MR CONTRAST NOS,1ML: HCPCS | Performed by: RADIOLOGY

## 2025-04-13 PROCEDURE — 1200000000 HC SEMI PRIVATE

## 2025-04-13 PROCEDURE — 6370000000 HC RX 637 (ALT 250 FOR IP): Performed by: INTERNAL MEDICINE

## 2025-04-13 PROCEDURE — 6360000004 HC RX CONTRAST MEDICATION: Performed by: RADIOLOGY

## 2025-04-13 PROCEDURE — 72158 MRI LUMBAR SPINE W/O & W/DYE: CPT

## 2025-04-13 PROCEDURE — 6370000000 HC RX 637 (ALT 250 FOR IP): Performed by: GENERAL PRACTICE

## 2025-04-13 RX ADMIN — TRAMADOL HYDROCHLORIDE 50 MG: 50 TABLET, COATED ORAL at 04:41

## 2025-04-13 RX ADMIN — GABAPENTIN 200 MG: 100 CAPSULE ORAL at 09:02

## 2025-04-13 RX ADMIN — CARVEDILOL 3.12 MG: 3.12 TABLET, FILM COATED ORAL at 09:03

## 2025-04-13 RX ADMIN — AMLODIPINE BESYLATE 5 MG: 5 TABLET ORAL at 09:03

## 2025-04-13 RX ADMIN — CLOPIDOGREL BISULFATE 75 MG: 75 TABLET, FILM COATED ORAL at 09:02

## 2025-04-13 RX ADMIN — TRAMADOL HYDROCHLORIDE 50 MG: 50 TABLET, COATED ORAL at 16:56

## 2025-04-13 RX ADMIN — GABAPENTIN 200 MG: 100 CAPSULE ORAL at 21:50

## 2025-04-13 RX ADMIN — GADOTERIDOL 13 ML: 279.3 INJECTION, SOLUTION INTRAVENOUS at 11:17

## 2025-04-13 RX ADMIN — PANTOPRAZOLE SODIUM 40 MG: 40 TABLET, DELAYED RELEASE ORAL at 07:07

## 2025-04-13 RX ADMIN — CARBIDOPA AND LEVODOPA 1 TABLET: 10; 100 TABLET ORAL at 21:50

## 2025-04-13 RX ADMIN — LOSARTAN POTASSIUM 50 MG: 50 TABLET, FILM COATED ORAL at 21:50

## 2025-04-13 RX ADMIN — CARVEDILOL 3.12 MG: 3.12 TABLET, FILM COATED ORAL at 18:54

## 2025-04-13 RX ADMIN — ZOLPIDEM TARTRATE 10 MG: 5 TABLET ORAL at 21:50

## 2025-04-13 RX ADMIN — LOSARTAN POTASSIUM 50 MG: 50 TABLET, FILM COATED ORAL at 09:03

## 2025-04-13 RX ADMIN — ATORVASTATIN CALCIUM 40 MG: 40 TABLET, FILM COATED ORAL at 09:03

## 2025-04-13 RX ADMIN — TRAMADOL HYDROCHLORIDE 50 MG: 50 TABLET, COATED ORAL at 21:50

## 2025-04-13 RX ADMIN — EMPAGLIFLOZIN 25 MG: 10 TABLET, FILM COATED ORAL at 09:02

## 2025-04-13 ASSESSMENT — PAIN - FUNCTIONAL ASSESSMENT
PAIN_FUNCTIONAL_ASSESSMENT: ACTIVITIES ARE NOT PREVENTED
PAIN_FUNCTIONAL_ASSESSMENT: PREVENTS OR INTERFERES SOME ACTIVE ACTIVITIES AND ADLS
PAIN_FUNCTIONAL_ASSESSMENT: PREVENTS OR INTERFERES SOME ACTIVE ACTIVITIES AND ADLS

## 2025-04-13 ASSESSMENT — PAIN DESCRIPTION - ORIENTATION
ORIENTATION: LOWER;MID
ORIENTATION: MID;LOWER
ORIENTATION: LOWER

## 2025-04-13 ASSESSMENT — PAIN SCALES - GENERAL
PAINLEVEL_OUTOF10: 5
PAINLEVEL_OUTOF10: 5
PAINLEVEL_OUTOF10: 7
PAINLEVEL_OUTOF10: 5

## 2025-04-13 ASSESSMENT — PAIN DESCRIPTION - ONSET: ONSET: ON-GOING

## 2025-04-13 ASSESSMENT — PAIN DESCRIPTION - LOCATION
LOCATION: BACK

## 2025-04-13 ASSESSMENT — PAIN DESCRIPTION - DESCRIPTORS
DESCRIPTORS: DISCOMFORT;ACHING;SORE
DESCRIPTORS: ACHING;DISCOMFORT;SPASM

## 2025-04-13 ASSESSMENT — PAIN DESCRIPTION - FREQUENCY: FREQUENCY: CONTINUOUS

## 2025-04-13 ASSESSMENT — PAIN DESCRIPTION - PAIN TYPE: TYPE: CHRONIC PAIN

## 2025-04-13 NOTE — PLAN OF CARE
Problem: Chronic Conditions and Co-morbidities  Goal: Patient's chronic conditions and co-morbidity symptoms are monitored and maintained or improved  Outcome: Progressing  Flowsheets (Taken 4/12/2025 2022 by Naomy Troy, RN)  Care Plan - Patient's Chronic Conditions and Co-Morbidity Symptoms are Monitored and Maintained or Improved: Monitor and assess patient's chronic conditions and comorbid symptoms for stability, deterioration, or improvement     Problem: Discharge Planning  Goal: Discharge to home or other facility with appropriate resources  Outcome: Progressing  Flowsheets (Taken 4/12/2025 2022 by Naomy Troy, RN)  Discharge to home or other facility with appropriate resources: Refer to discharge planning if patient needs post-hospital services based on physician order or complex needs related to functional status, cognitive ability or social support system     Problem: Pain  Goal: Verbalizes/displays adequate comfort level or baseline comfort level  Outcome: Progressing  Flowsheets (Taken 4/13/2025 0000 by Naomy Troy RN)  Verbalizes/displays adequate comfort level or baseline comfort level:   Encourage patient to monitor pain and request assistance   Assess pain using appropriate pain scale   Administer analgesics based on type and severity of pain and evaluate response   Implement non-pharmacological measures as appropriate and evaluate response     Problem: Safety - Adult  Goal: Free from fall injury  Outcome: Progressing     Problem: ABCDS Injury Assessment  Goal: Absence of physical injury  Outcome: Progressing

## 2025-04-13 NOTE — PLAN OF CARE
Problem: Chronic Conditions and Co-morbidities  Goal: Patient's chronic conditions and co-morbidity symptoms are monitored and maintained or improved  4/13/2025 1010 by Trino Allen RN  Outcome: Progressing  4/13/2025 0543 by Monica Nicole RN  Outcome: Progressing  Flowsheets (Taken 4/12/2025 2022 by Naomy Troy, RN)  Care Plan - Patient's Chronic Conditions and Co-Morbidity Symptoms are Monitored and Maintained or Improved: Monitor and assess patient's chronic conditions and comorbid symptoms for stability, deterioration, or improvement     Problem: Discharge Planning  Goal: Discharge to home or other facility with appropriate resources  4/13/2025 1010 by Trino Allen RN  Outcome: Progressing  4/13/2025 0543 by Monica Nicole RN  Outcome: Progressing  Flowsheets (Taken 4/12/2025 2022 by Naomy Troy, RN)  Discharge to home or other facility with appropriate resources: Refer to discharge planning if patient needs post-hospital services based on physician order or complex needs related to functional status, cognitive ability or social support system     Problem: Pain  Goal: Verbalizes/displays adequate comfort level or baseline comfort level  4/13/2025 1010 by Trino Allen RN  Outcome: Progressing  4/13/2025 0543 by Monica Nicole RN  Outcome: Progressing  Flowsheets (Taken 4/13/2025 0000 by Naomy Troy, RN)  Verbalizes/displays adequate comfort level or baseline comfort level:   Encourage patient to monitor pain and request assistance   Assess pain using appropriate pain scale   Administer analgesics based on type and severity of pain and evaluate response   Implement non-pharmacological measures as appropriate and evaluate response     Problem: Safety - Adult  Goal: Free from fall injury  4/13/2025 1010 by Trino Allen RN  Outcome: Progressing  4/13/2025 0543 by Monica Nicole RN  Outcome: Progressing     Problem: ABCDS Injury Assessment  Goal: Absence of physical injury  4/13/2025 1010

## 2025-04-14 VITALS
DIASTOLIC BLOOD PRESSURE: 59 MMHG | HEART RATE: 55 BPM | BODY MASS INDEX: 23.3 KG/M2 | HEIGHT: 66 IN | SYSTOLIC BLOOD PRESSURE: 105 MMHG | WEIGHT: 145 LBS | TEMPERATURE: 97.7 F | RESPIRATION RATE: 16 BRPM | OXYGEN SATURATION: 95 %

## 2025-04-14 PROCEDURE — 6370000000 HC RX 637 (ALT 250 FOR IP): Performed by: INTERNAL MEDICINE

## 2025-04-14 PROCEDURE — 97161 PT EVAL LOW COMPLEX 20 MIN: CPT

## 2025-04-14 PROCEDURE — 6370000000 HC RX 637 (ALT 250 FOR IP): Performed by: GENERAL PRACTICE

## 2025-04-14 RX ORDER — AMLODIPINE BESYLATE 5 MG/1
5 TABLET ORAL DAILY
Qty: 30 TABLET | Refills: 3 | Status: SHIPPED | OUTPATIENT
Start: 2025-04-15

## 2025-04-14 RX ADMIN — EMPAGLIFLOZIN 10 MG: 10 TABLET, FILM COATED ORAL at 08:13

## 2025-04-14 RX ADMIN — TRAMADOL HYDROCHLORIDE 50 MG: 50 TABLET, COATED ORAL at 01:50

## 2025-04-14 RX ADMIN — GABAPENTIN 200 MG: 100 CAPSULE ORAL at 08:14

## 2025-04-14 RX ADMIN — ATORVASTATIN CALCIUM 40 MG: 40 TABLET, FILM COATED ORAL at 08:13

## 2025-04-14 RX ADMIN — POLYETHYLENE GLYCOL 3350 17 G: 17 POWDER, FOR SOLUTION ORAL at 06:38

## 2025-04-14 RX ADMIN — CARVEDILOL 3.12 MG: 3.12 TABLET, FILM COATED ORAL at 08:13

## 2025-04-14 RX ADMIN — LOSARTAN POTASSIUM 50 MG: 50 TABLET, FILM COATED ORAL at 08:14

## 2025-04-14 RX ADMIN — TRAMADOL HYDROCHLORIDE 50 MG: 50 TABLET, COATED ORAL at 06:33

## 2025-04-14 RX ADMIN — PANTOPRAZOLE SODIUM 40 MG: 40 TABLET, DELAYED RELEASE ORAL at 06:33

## 2025-04-14 RX ADMIN — AMLODIPINE BESYLATE 5 MG: 5 TABLET ORAL at 08:13

## 2025-04-14 RX ADMIN — CLOPIDOGREL BISULFATE 75 MG: 75 TABLET, FILM COATED ORAL at 08:13

## 2025-04-14 ASSESSMENT — PAIN SCALES - GENERAL
PAINLEVEL_OUTOF10: 0
PAINLEVEL_OUTOF10: 6
PAINLEVEL_OUTOF10: 0
PAINLEVEL_OUTOF10: 4

## 2025-04-14 ASSESSMENT — PAIN DESCRIPTION - DESCRIPTORS
DESCRIPTORS: ACHING;DISCOMFORT;SORE
DESCRIPTORS: ACHING;DISCOMFORT;SORE;SPASM

## 2025-04-14 ASSESSMENT — PAIN - FUNCTIONAL ASSESSMENT
PAIN_FUNCTIONAL_ASSESSMENT: PREVENTS OR INTERFERES SOME ACTIVE ACTIVITIES AND ADLS
PAIN_FUNCTIONAL_ASSESSMENT: PREVENTS OR INTERFERES SOME ACTIVE ACTIVITIES AND ADLS

## 2025-04-14 ASSESSMENT — PAIN DESCRIPTION - LOCATION
LOCATION: BACK
LOCATION: BACK

## 2025-04-14 NOTE — PLAN OF CARE
Problem: Chronic Conditions and Co-morbidities  Goal: Patient's chronic conditions and co-morbidity symptoms are monitored and maintained or improved  4/14/2025 1052 by Janneth Vizcaino, RN  Outcome: Progressing  Flowsheets (Taken 4/14/2025 1036)  Care Plan - Patient's Chronic Conditions and Co-Morbidity Symptoms are Monitored and Maintained or Improved: Monitor and assess patient's chronic conditions and comorbid symptoms for stability, deterioration, or improvement  4/14/2025 0322 by Monica Nicole, RN  Outcome: Progressing     Problem: Pain  Goal: Verbalizes/displays adequate comfort level or baseline comfort level  4/14/2025 1052 by Janneth Vizcaino, RN  Outcome: Progressing  4/14/2025 0322 by Monica Nicole, RN  Outcome: Progressing     Problem: Safety - Adult  Goal: Free from fall injury  4/14/2025 1052 by Janneth Vizcaino, RN  Outcome: Progressing  4/14/2025 0322 by Monica Nicole, RN  Outcome: Progressing

## 2025-04-14 NOTE — PROGRESS NOTES
4 Eyes Skin Assessment     NAME:  Andrei Ashton  YOB: 1945  MEDICAL RECORD NUMBER:  [id number]    The patient is being assessed for  Admission    I agree that at least one RN has performed a thorough Head to Toe Skin Assessment on the patient. ALL assessment sites listed below have been assessed.      Areas assessed by both nurses:    Head, Face, Ears, Shoulders, Back, Chest, Arms, Elbows, Hands, Sacrum. Buttock, Coccyx, Ischium, Legs. Feet and Heels, and Under Medical Devices         Does the Patient have a Wound? No noted wound(s)       Michael Prevention initiated by RN: Yes  Wound Care Orders initiated by RN: No    Pressure Injury (Stage 3,4, Unstageable, DTI, NWPT, and Complex wounds) if present, place Wound referral order by RN under : No    New Ostomies, if present place, Ostomy referral order under : No     Nurse 1 eSignature: Electronically signed by Isela Alvarez RN on 4/11/25 at 2:08 AM EDT    **SHARE this note so that the co-signing nurse can place an eSignature**    Nurse 2 eSignature: Electronically signed by Monica Nicole RN on 4/11/25 at 3:32 AM EDT    
CLINICAL PHARMACY NOTE: MEDS TO BEDS    Total # of Prescriptions Filled: 1   The following medications were delivered to the patient:  Amlodipine 5 mg    Additional Documentation:    
CLINICAL PHARMACY NOTE: MEDS TO BEDS    Total # of Prescriptions Filled: 1   The following medications were delivered to the patient:  Amlodipine 5mg    Additional Documentation:    
Contacted dr. Mcmanus for admission orders.  
Dr. Mcgraw informed patient had Nuclear Stress test 12/2024, order to discontinue received. Floor RN notified.  
Family members at bedside, updated on plan of care.   
Less chest pain . Still co pain in low back with spontaneous movements and  jerking of legs. Recheck mri  
Occupational Therapy  OT eval and treat order received.Pt is up ambulating independently in the room and reports being independent with his self care.Pt does not  have OT needs at this time.Will discharge OT eval and treat order due to pt high level of independence.Chari Villegas OTR/L [id number]   
Patient seen stable. No further chest pain . Back pain continues. 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  
Physical Therapy  Facility/Department: [address] MED SURG/TELE  Physical Therapy Initial Assessment    Name: Andrei Ashton  : 1945  MRN: [id number]  Date of Service: 2025    Attending Provider:  Fredis Mcmanus DO    Evaluating PT:  Bebeto Gupta Jr., P.T.    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  Precautions:  none    SUBJECTIVE:    Pt lives with his wife in a 1 story home with 4 stairs and no rail to enter through the front door or 4 steps and 1 rail to enter through the back door.  Pt ambulated with no AD PTA.  He has a ww    OBJECTIVE:   Initial Evaluation  Date: 25   Was pt agreeable to Eval/treatment? yes   Does pt have pain? No c/o pain   Bed Mobility  Rolling: Independent  Supine to sit: Independent  Sit to supine: Independent  Scooting: Independent   Transfers Sit to stand: Independent  Stand to sit: Independent  Stand pivot: Independent   Ambulation   400 feet with no AD Independent    Stair negotiation: ascended and descended 10 steps with 1 rail Independent    AM-PAC 6 Clicks      BLE ROM is WFL.   BLE strength is grossly 5/5.   Sensation:  Pt denies numbness and tingling to extremities  Balance: sitting is Independent and standing with no AD is Independent   Endurance: fair+    ASSESSMENT:    Comments:  Pt was agreeable to PT and demonstrated [place] with functional mobility at this time and has no skilled PT needs.    Pt was left supine in bed with call light left by patient.    Pt's/ family goals   1. To go home.     Patient and or family understand(s) diagnosis, prognosis, and plan of care.    PHYSICAL THERAPY PLAN OF CARE:    PT will discharge pt from our service at this time.      Referring provider/PT Order:  PT eval and treat    Time in  07:40  Time out  07:50    Evaluation Time includes 
Reviewed  Continuous Infusions:     Intake/Output Summary (Last 24 hours) at 4/12/2025 1501  Last data filed at 4/12/2025 1409  Gross per 24 hour   Intake --   Output 600 ml   Net -600 ml     CBC:   Recent Labs     04/10/25  1703   WBC 9.9   HGB 16.8*   HCT 49.4        BMP:  Recent Labs     04/10/25  1703      K 4.3      CO2 25   BUN 14   CREATININE 1.1   LABGLOM 66     ABGs:   Lab Results   Component Value Date/Time    PH 7.398 04/27/2016 09:15 PM    PO2 90.6 04/27/2016 09:15 PM    PCO2 40.2 04/27/2016 09:15 PM     INR: No results for input(s): \"INR\" in the last 72 hours.  PRO-BNP:   Lab Results   Component Value Date    PROBNP 98 04/11/2025    PROBNP 75 12/05/2024      TSH:   Lab Results   Component Value Date    TSH 3.14 12/05/2024      Cardiac Injury Profile:   Recent Labs     04/10/25  1703 04/10/25  1845 04/11/25  0419   TROPHS 10 8 9      Lipid Profile:   Lab Results   Component Value Date/Time    TRIG 197 12/05/2024 07:54 AM    HDL 40 12/05/2024 07:54 AM     12/05/2024 07:54 AM    LDL 76 05/25/2021 12:00 PM    CHOL 199 12/05/2024 07:54 AM      Hemoglobin A1C: No components found for: \"HGBA1C\"      RAD:   XR CHEST PORTABLE  Result Date: 4/10/2025  No acute process.         EKG: See Report  Echo: See Report      IMPRESSIONS:  Patient Active Problem List   Diagnosis Code    Coronary artery disease involving native coronary artery of native heart without angina pectoris I25.10    Carotid artery stenosis, asymptomatic, bilateral I65.23    Hx of CABG Z95.1    Essential hypertension I10    Mixed hyperlipidemia E78.2    PFO (patent foramen ovale) Q21.12    Status post placement of implantable loop recorder Z95.818    Lumbar disc disease M51.9    Benign prostatic hyperplasia without lower urinary tract symptoms N40.0    Dupuytren's disease of palm of left hand M72.0    Primary insomnia F51.01    Carotid atherosclerosis, bilateral I65.23    Type 2 diabetes mellitus without complication,

## 2025-04-21 RX ORDER — AMLODIPINE BESYLATE 5 MG/1
5 TABLET ORAL DAILY
Qty: 90 TABLET | Refills: 3 | Status: SHIPPED | OUTPATIENT
Start: 2025-04-21

## 2025-05-07 ENCOUNTER — TELEPHONE (OUTPATIENT)
Dept: CARDIOLOGY CLINIC | Age: 80
End: 2025-05-07

## 2025-05-07 NOTE — TELEPHONE ENCOUNTER
Monitor summary:     Afib 101-118, touched 154 non-sustaining   (f) PVC, Coup, Bigem, Trigem   -/.08/-             Patient's daughter (Kelsey) notified of Dr. Doss's assessment/recommendations. Note faxed to Dr. Tsai.

## 2025-05-07 NOTE — TELEPHONE ENCOUNTER
He does have underlying coronary artery disease.  Antiplatelet agents to help to decrease perioperative cardiac risks.  If the procedure can be performed safely on aspirin then I would recommend changing the Plavix to aspirin 1 week prior to the procedure and not holding the aspirin.  If this is a procedure that cannot be performed on any antiplatelet medications then it would be considered acceptable to hold the Plavix for 7 days prior to the procedure.  However, reviewing his notes, the Plavix is also being used for his CVA.  I would also check with whomever is managing his CVA as well.

## 2025-05-07 NOTE — TELEPHONE ENCOUNTER
It looks like they switched to Dr. Mcgraw.  Please see his consult and progress note 4- and 4-.

## 2025-05-07 NOTE — TELEPHONE ENCOUNTER
Patient's daughter (Kelsey) called stating patient needs epidural steroid injection at [facility] by Dr. Tsai.    Recommended to hold Plavix.    [phone]

## 2025-07-20 ENCOUNTER — APPOINTMENT (OUTPATIENT)
Dept: GENERAL RADIOLOGY | Age: 80
End: 2025-07-20
Payer: MEDICARE

## 2025-07-20 ENCOUNTER — HOSPITAL ENCOUNTER (EMERGENCY)
Age: 80
Discharge: HOME | End: 2025-07-20
Attending: STUDENT IN AN ORGANIZED HEALTH CARE EDUCATION/TRAINING PROGRAM
Payer: MEDICARE

## 2025-07-20 VITALS
TEMPERATURE: 98.1 F | HEART RATE: 68 BPM | OXYGEN SATURATION: 96 % | SYSTOLIC BLOOD PRESSURE: 134 MMHG | RESPIRATION RATE: 16 BRPM | DIASTOLIC BLOOD PRESSURE: 81 MMHG

## 2025-07-20 DIAGNOSIS — G25.81 RESTLESS LEG SYNDROME: Primary | ICD-10-CM

## 2025-07-20 LAB
ALBUMIN SERPL-MCNC: 3.5 G/DL (ref 3.5–5.2)
ALP SERPL-CCNC: 130 U/L (ref 40–129)
ALT SERPL-CCNC: 19 U/L (ref 0–50)
ANION GAP SERPL CALCULATED.3IONS-SCNC: 9 MMOL/L (ref 7–16)
AST SERPL-CCNC: 22 U/L (ref 0–50)
BASOPHILS # BLD: 0.05 K/UL (ref 0–0.2)
BASOPHILS NFR BLD: 1 % (ref 0–2)
BILIRUB SERPL-MCNC: 0.9 MG/DL (ref 0–1.2)
BILIRUB UR QL STRIP: NEGATIVE
BUN SERPL-MCNC: 16 MG/DL (ref 8–23)
CALCIUM SERPL-MCNC: 9.2 MG/DL (ref 8.8–10.2)
CHLORIDE SERPL-SCNC: 105 MMOL/L (ref 98–107)
CLARITY UR: CLEAR
CO2 SERPL-SCNC: 24 MMOL/L (ref 22–29)
COLOR UR: YELLOW
CREAT SERPL-MCNC: 1.1 MG/DL (ref 0.7–1.2)
EOSINOPHIL # BLD: 0.29 K/UL (ref 0.05–0.5)
EOSINOPHILS RELATIVE PERCENT: 3 % (ref 0–6)
ERYTHROCYTE [DISTWIDTH] IN BLOOD BY AUTOMATED COUNT: 14.1 % (ref 11.5–15)
GFR, ESTIMATED: 68 ML/MIN/1.73M2
GLUCOSE SERPL-MCNC: 127 MG/DL (ref 74–99)
GLUCOSE UR STRIP-MCNC: NEGATIVE MG/DL
HCT VFR BLD AUTO: 46.4 % (ref 37–54)
HGB BLD-MCNC: 15.4 G/DL (ref 12.5–16.5)
HGB UR QL STRIP.AUTO: NEGATIVE
IMM GRANULOCYTES # BLD AUTO: 0.05 K/UL (ref 0–0.58)
IMM GRANULOCYTES NFR BLD: 1 % (ref 0–5)
KETONES UR STRIP-MCNC: NEGATIVE MG/DL
LEUKOCYTE ESTERASE UR QL STRIP: NEGATIVE
LYMPHOCYTES NFR BLD: 1.86 K/UL (ref 1.5–4)
LYMPHOCYTES RELATIVE PERCENT: 20 % (ref 20–42)
MCH RBC QN AUTO: 30.7 PG (ref 26–35)
MCHC RBC AUTO-ENTMCNC: 33.2 G/DL (ref 32–34.5)
MCV RBC AUTO: 92.4 FL (ref 80–99.9)
MONOCYTES NFR BLD: 0.91 K/UL (ref 0.1–0.95)
MONOCYTES NFR BLD: 10 % (ref 2–12)
NEUTROPHILS NFR BLD: 67 % (ref 43–80)
NEUTS SEG NFR BLD: 6.37 K/UL (ref 1.8–7.3)
NITRITE UR QL STRIP: NEGATIVE
PH UR STRIP: 6.5 [PH] (ref 5–8)
PLATELET # BLD AUTO: 200 K/UL (ref 130–450)
PMV BLD AUTO: 10.9 FL (ref 7–12)
POTASSIUM SERPL-SCNC: 4 MMOL/L (ref 3.5–5.1)
PROT SERPL-MCNC: 6.6 G/DL (ref 6.4–8.3)
PROT UR STRIP-MCNC: NEGATIVE MG/DL
RBC # BLD AUTO: 5.02 M/UL (ref 3.8–5.8)
RBC #/AREA URNS HPF: NORMAL /HPF
SODIUM SERPL-SCNC: 138 MMOL/L (ref 136–145)
SP GR UR STRIP: 1.01 (ref 1–1.03)
UROBILINOGEN UR STRIP-ACNC: 1 EU/DL (ref 0–1)
WBC #/AREA URNS HPF: NORMAL /HPF
WBC OTHER # BLD: 9.5 K/UL (ref 4.5–11.5)

## 2025-07-20 PROCEDURE — 6370000000 HC RX 637 (ALT 250 FOR IP)

## 2025-07-20 PROCEDURE — 81001 URINALYSIS AUTO W/SCOPE: CPT

## 2025-07-20 PROCEDURE — 85025 COMPLETE CBC W/AUTO DIFF WBC: CPT

## 2025-07-20 PROCEDURE — 93005 ELECTROCARDIOGRAM TRACING: CPT

## 2025-07-20 PROCEDURE — 87086 URINE CULTURE/COLONY COUNT: CPT

## 2025-07-20 PROCEDURE — 80053 COMPREHEN METABOLIC PANEL: CPT

## 2025-07-20 PROCEDURE — 99285 EMERGENCY DEPT VISIT HI MDM: CPT

## 2025-07-20 PROCEDURE — 73521 X-RAY EXAM HIPS BI 2 VIEWS: CPT

## 2025-07-20 RX ORDER — GABAPENTIN 300 MG/1
300 CAPSULE ORAL ONCE
Status: COMPLETED | OUTPATIENT
Start: 2025-07-20 | End: 2025-07-20

## 2025-07-20 RX ORDER — GABAPENTIN 300 MG/1
300 CAPSULE ORAL 3 TIMES DAILY
Qty: 42 CAPSULE | Refills: 0 | Status: SHIPPED | OUTPATIENT
Start: 2025-07-20 | End: 2025-08-03

## 2025-07-20 RX ADMIN — GABAPENTIN 300 MG: 300 CAPSULE ORAL at 09:37

## 2025-07-20 NOTE — ED PROVIDER NOTES
[facility] EMERGENCY DEPARTMENT  EMERGENCY DEPARTMENT ENCOUNTER        Pt Name: Andrei Ashton  MRN: [id number]  Birthdate 1945  Date of evaluation: 7/20/2025  Provider: Amie Wilburn MD  PCP: No primary care provider on file.  Note Started: 9:27 AM EDT 7/20/25    CHIEF COMPLAINT       Chief Complaint   Patient presents with    Restless Legs      Patient states his legs jump starting 4 months ago. Patient states they just jump randomly. Pt has no other complaints        HISTORY OF PRESENT ILLNESS: 1 or more Elements   History From: Patient    Limitations to history : None    Andrei Ashton is a 80 y.o. male who presents for impulsive leg movement beginning 3 months ago.  Describes his legs randomly jumping.  He states this has been an ongoing issue.  He states he has had previous back surgery 6 - 7 years ago.  No new injury or fall.  He denies any saddle anesthesia or numbness in his extremities.  Per patient, he has chronic incontinence.  He notes some bilateral hip pain.  Patient otherwise reports feeling well.  He denies any fever, chills, chest pain, shortness of breath.  He denies any abdominal pain, nausea, vomiting, diarrhea, hematochezia, melena, urinary symptoms.  Patient is able to ambulate without walker or cane.  Patient is on gabapentin twice daily.      Nursing Notes were all reviewed and agreed with or any disagreements were addressed in the HPI.      REVIEW OF EXTERNAL NOTES :         7/1/2025: Patient underwent MRI for myoclonus.  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  No hemorrhagic transformation.  No mass effect, midline shift, or herniation.  This was done at the [facility].      REVIEW OF SYSTEMS :      Positives and Pertinent negatives as per HPI.     SURGICAL HISTORY     Past Surgical History:   Procedure Laterality Date    ANKLE FRACTURE SURGERY Left 2020    BACK SURGERY      CARDIAC CATHETERIZATION

## 2025-07-20 NOTE — DISCHARGE INSTRUCTIONS
Increase gabapentin  Follow-up with primary care doctor  If you notice any new worrisome symptoms please return to Emergency Department for evaluation

## 2025-07-21 LAB
MICROORGANISM SPEC CULT: NO GROWTH
SERVICE CMNT-IMP: NORMAL
SPECIMEN DESCRIPTION: NORMAL

## 2025-07-23 LAB
EKG ATRIAL RATE: 57 BPM
EKG P AXIS: 67 DEGREES
EKG P-R INTERVAL: 154 MS
EKG Q-T INTERVAL: 404 MS
EKG QRS DURATION: 88 MS
EKG QTC CALCULATION (BAZETT): 393 MS
EKG R AXIS: 35 DEGREES
EKG T AXIS: 47 DEGREES
EKG VENTRICULAR RATE: 57 BPM

## 2025-07-23 PROCEDURE — 93010 ELECTROCARDIOGRAM REPORT: CPT | Performed by: INTERNAL MEDICINE

## 2025-08-18 ENCOUNTER — OFFICE VISIT (OUTPATIENT)
Dept: CARDIOLOGY CLINIC | Age: 80
End: 2025-08-18
Payer: MEDICARE

## 2025-08-18 ENCOUNTER — TELEPHONE (OUTPATIENT)
Dept: CARDIOLOGY CLINIC | Age: 80
End: 2025-08-18

## 2025-08-18 VITALS
OXYGEN SATURATION: 95 % | SYSTOLIC BLOOD PRESSURE: 136 MMHG | TEMPERATURE: 97 F | RESPIRATION RATE: 18 BRPM | HEART RATE: 57 BPM | DIASTOLIC BLOOD PRESSURE: 70 MMHG | BODY MASS INDEX: 26.56 KG/M2 | WEIGHT: 135.3 LBS | HEIGHT: 60 IN

## 2025-08-18 DIAGNOSIS — I25.10 CORONARY ARTERY DISEASE INVOLVING NATIVE CORONARY ARTERY OF NATIVE HEART WITHOUT ANGINA PECTORIS: Primary | ICD-10-CM

## 2025-08-18 DIAGNOSIS — R00.2 PALPITATIONS: ICD-10-CM

## 2025-08-18 DIAGNOSIS — Q21.12 PFO (PATENT FORAMEN OVALE): ICD-10-CM

## 2025-08-18 PROCEDURE — G8427 DOCREV CUR MEDS BY ELIG CLIN: HCPCS | Performed by: INTERNAL MEDICINE

## 2025-08-18 PROCEDURE — 3078F DIAST BP <80 MM HG: CPT | Performed by: INTERNAL MEDICINE

## 2025-08-18 PROCEDURE — 1123F ACP DISCUSS/DSCN MKR DOCD: CPT | Performed by: INTERNAL MEDICINE

## 2025-08-18 PROCEDURE — 1036F TOBACCO NON-USER: CPT | Performed by: INTERNAL MEDICINE

## 2025-08-18 PROCEDURE — G8419 CALC BMI OUT NRM PARAM NOF/U: HCPCS | Performed by: INTERNAL MEDICINE

## 2025-08-18 PROCEDURE — G2211 COMPLEX E/M VISIT ADD ON: HCPCS | Performed by: INTERNAL MEDICINE

## 2025-08-18 PROCEDURE — 99214 OFFICE O/P EST MOD 30 MIN: CPT | Performed by: INTERNAL MEDICINE

## 2025-08-18 PROCEDURE — 3075F SYST BP GE 130 - 139MM HG: CPT | Performed by: INTERNAL MEDICINE

## 2025-08-18 PROCEDURE — 1159F MED LIST DOCD IN RCRD: CPT | Performed by: INTERNAL MEDICINE

## 2025-08-18 PROCEDURE — 93000 ELECTROCARDIOGRAM COMPLETE: CPT | Performed by: INTERNAL MEDICINE

## 2025-08-18 RX ORDER — LEVETIRACETAM 250 MG/1
250 TABLET ORAL 2 TIMES DAILY
COMMUNITY

## 2025-08-18 RX ORDER — LANOLIN ALCOHOL/MO/W.PET/CERES
1000 CREAM (GRAM) TOPICAL DAILY
COMMUNITY
Start: 2025-07-24

## 2025-08-18 RX ORDER — ISOSORBIDE DINITRATE 5 MG/1
5 TABLET ORAL 3 TIMES DAILY
Qty: 90 TABLET | Refills: 3 | Status: SHIPPED | OUTPATIENT
Start: 2025-08-18

## 2025-08-20 ENCOUNTER — TELEPHONE (OUTPATIENT)
Dept: CARDIOLOGY CLINIC | Age: 80
End: 2025-08-20

## 2025-08-20 DIAGNOSIS — Q21.12 PFO (PATENT FORAMEN OVALE): Primary | ICD-10-CM

## 2025-08-23 ENCOUNTER — APPOINTMENT (OUTPATIENT)
Dept: CT IMAGING | Age: 80
End: 2025-08-23
Payer: MEDICARE

## 2025-08-23 ENCOUNTER — HOSPITAL ENCOUNTER (EMERGENCY)
Age: 80
Discharge: HOME | End: 2025-08-23
Payer: MEDICARE

## 2025-08-23 VITALS
TEMPERATURE: 97.9 F | RESPIRATION RATE: 18 BRPM | HEIGHT: 66 IN | SYSTOLIC BLOOD PRESSURE: 118 MMHG | DIASTOLIC BLOOD PRESSURE: 60 MMHG | WEIGHT: 135 LBS | OXYGEN SATURATION: 100 % | HEART RATE: 56 BPM | BODY MASS INDEX: 21.69 KG/M2

## 2025-08-23 DIAGNOSIS — R19.7 DIARRHEA, UNSPECIFIED TYPE: ICD-10-CM

## 2025-08-23 DIAGNOSIS — K92.1 MELENA: Primary | ICD-10-CM

## 2025-08-23 DIAGNOSIS — R10.32 ABDOMINAL PAIN, LEFT LOWER QUADRANT: ICD-10-CM

## 2025-08-23 LAB
ALBUMIN SERPL-MCNC: 3.6 G/DL (ref 3.5–5.2)
ALP SERPL-CCNC: 111 U/L (ref 40–129)
ALT SERPL-CCNC: 16 U/L (ref 0–50)
ANION GAP SERPL CALCULATED.3IONS-SCNC: 13 MMOL/L (ref 7–16)
AST SERPL-CCNC: 19 U/L (ref 0–50)
BASOPHILS # BLD: 0.04 K/UL (ref 0–0.2)
BASOPHILS NFR BLD: 0 % (ref 0–2)
BILIRUB SERPL-MCNC: 1.1 MG/DL (ref 0–1.2)
BUN SERPL-MCNC: 14 MG/DL (ref 8–23)
CALCIUM SERPL-MCNC: 9.5 MG/DL (ref 8.8–10.2)
CHLORIDE SERPL-SCNC: 103 MMOL/L (ref 98–107)
CO2 SERPL-SCNC: 24 MMOL/L (ref 22–29)
CREAT SERPL-MCNC: 1.2 MG/DL (ref 0.7–1.2)
EOSINOPHIL # BLD: 0.26 K/UL (ref 0.05–0.5)
EOSINOPHILS RELATIVE PERCENT: 3 % (ref 0–6)
ERYTHROCYTE [DISTWIDTH] IN BLOOD BY AUTOMATED COUNT: 13.8 % (ref 11.5–15)
GFR, ESTIMATED: 61 ML/MIN/1.73M2
GLUCOSE SERPL-MCNC: 113 MG/DL (ref 74–99)
HCT VFR BLD AUTO: 44.6 % (ref 37–54)
HGB BLD-MCNC: 15 G/DL (ref 12.5–16.5)
IMM GRANULOCYTES # BLD AUTO: 0.03 K/UL (ref 0–0.58)
IMM GRANULOCYTES NFR BLD: 0 % (ref 0–5)
INR PPP: 1.1
LACTATE BLDV-SCNC: 2 MMOL/L (ref 0.5–2.2)
LIPASE SERPL-CCNC: 11 U/L (ref 13–60)
LYMPHOCYTES NFR BLD: 1.71 K/UL (ref 1.5–4)
LYMPHOCYTES RELATIVE PERCENT: 19 % (ref 20–42)
MCH RBC QN AUTO: 30.8 PG (ref 26–35)
MCHC RBC AUTO-ENTMCNC: 33.6 G/DL (ref 32–34.5)
MCV RBC AUTO: 91.6 FL (ref 80–99.9)
MONOCYTES NFR BLD: 0.91 K/UL (ref 0.1–0.95)
MONOCYTES NFR BLD: 10 % (ref 2–12)
NEUTROPHILS NFR BLD: 68 % (ref 43–80)
NEUTS SEG NFR BLD: 6.22 K/UL (ref 1.8–7.3)
PLATELET # BLD AUTO: 257 K/UL (ref 130–450)
PMV BLD AUTO: 11.3 FL (ref 7–12)
POTASSIUM SERPL-SCNC: 3.6 MMOL/L (ref 3.5–5.1)
PROT SERPL-MCNC: 6.8 G/DL (ref 6.4–8.3)
PROTHROMBIN TIME: 11.5 SEC (ref 9.3–12.4)
RBC # BLD AUTO: 4.87 M/UL (ref 3.8–5.8)
SODIUM SERPL-SCNC: 140 MMOL/L (ref 136–145)
WBC OTHER # BLD: 9.2 K/UL (ref 4.5–11.5)

## 2025-08-23 PROCEDURE — 83605 ASSAY OF LACTIC ACID: CPT

## 2025-08-23 PROCEDURE — 99284 EMERGENCY DEPT VISIT MOD MDM: CPT

## 2025-08-23 PROCEDURE — 80053 COMPREHEN METABOLIC PANEL: CPT

## 2025-08-23 PROCEDURE — 85025 COMPLETE CBC W/AUTO DIFF WBC: CPT

## 2025-08-23 PROCEDURE — 83690 ASSAY OF LIPASE: CPT

## 2025-08-23 PROCEDURE — 74176 CT ABD & PELVIS W/O CONTRAST: CPT

## 2025-08-23 PROCEDURE — 85610 PROTHROMBIN TIME: CPT

## 2025-08-23 RX ORDER — PANTOPRAZOLE SODIUM 40 MG/1
40 TABLET, DELAYED RELEASE ORAL DAILY
Qty: 30 TABLET | Refills: 0 | Status: SHIPPED | OUTPATIENT
Start: 2025-08-23

## 2025-08-23 RX ORDER — MORPHINE SULFATE 4 MG/ML
4 INJECTION, SOLUTION INTRAMUSCULAR; INTRAVENOUS ONCE
Status: DISCONTINUED | OUTPATIENT
Start: 2025-08-23 | End: 2025-08-23 | Stop reason: HOSPADM

## 2025-08-23 ASSESSMENT — PAIN SCALES - GENERAL: PAINLEVEL_OUTOF10: 8

## 2025-08-23 ASSESSMENT — LIFESTYLE VARIABLES
HOW MANY STANDARD DRINKS CONTAINING ALCOHOL DO YOU HAVE ON A TYPICAL DAY: PATIENT DOES NOT DRINK
HOW OFTEN DO YOU HAVE A DRINK CONTAINING ALCOHOL: NEVER

## 2025-08-25 ENCOUNTER — TELEPHONE (OUTPATIENT)
Age: 80
End: 2025-08-25

## 2025-09-02 ENCOUNTER — HOSPITAL ENCOUNTER (OUTPATIENT)
Age: 80
Discharge: HOME | End: 2025-09-04

## 2025-09-02 ENCOUNTER — TELEPHONE (OUTPATIENT)
Age: 80
End: 2025-09-02

## 2025-09-02 PROCEDURE — 87427 SHIGA-LIKE TOXIN AG IA: CPT

## 2025-09-02 PROCEDURE — 87449 NOS EACH ORGANISM AG IA: CPT

## 2025-09-02 PROCEDURE — 87324 CLOSTRIDIUM AG IA: CPT

## 2025-09-02 PROCEDURE — 87045 FECES CULTURE AEROBIC BACT: CPT

## 2025-09-02 PROCEDURE — 88305 TISSUE EXAM BY PATHOLOGIST: CPT

## 2025-09-02 PROCEDURE — 83630 LACTOFERRIN FECAL (QUAL): CPT

## 2025-09-02 PROCEDURE — 87046 STOOL CULTR AEROBIC BACT EA: CPT

## 2025-09-02 PROCEDURE — 82705 FATS/LIPIDS FECES QUAL: CPT

## 2025-09-02 PROCEDURE — 89190 NASAL SMEAR FOR EOSINOPHILS: CPT

## 2025-09-02 PROCEDURE — 83986 ASSAY PH BODY FLUID NOS: CPT

## 2025-09-02 PROCEDURE — 87329 GIARDIA AG IA: CPT

## 2025-09-02 PROCEDURE — 88342 IMHCHEM/IMCYTCHM 1ST ANTB: CPT

## 2025-09-03 LAB
C DIFF GDH + TOXINS A+B STL QL IA.RAPID: NEGATIVE
EOSINOPHIL # BLD: NORMAL 10*3/UL
G LAMBLIA AG STL QL IA: NEGATIVE
LACTOFERRIN STL QL: NEGATIVE
SOURCE: NORMAL
SPECIMEN DESCRIPTION: NORMAL
SPECIMEN DESCRIPTION: NORMAL

## 2025-09-04 LAB
MICROORGANISM SPEC CULT: NORMAL
MICROORGANISM SPEC CULT: NORMAL
PH STL: 8 [PH] (ref 5–8.5)
SPECIMEN DESCRIPTION: NORMAL

## 2025-09-05 DIAGNOSIS — R00.2 PALPITATIONS: ICD-10-CM

## 2025-09-05 LAB
FAT QUALITATIVE SPLIT STOOL: NORMAL
FECAL NEUTRAL FAT: NORMAL
SURGICAL PATHOLOGY REPORT: NORMAL

## (undated) DEVICE — SHEET,DRAPE,40X58,STERILE: Brand: MEDLINE

## (undated) DEVICE — 5.0MM PRECISION ROUND

## (undated) DEVICE — JACKSON TABLE POSITIONER KIT: Brand: MEDLINE INDUSTRIES, INC.

## (undated) DEVICE — NEEDLE SPNL L3.5IN PNK HUB S STL REG WALL FIT STYL W/ QNCKE

## (undated) DEVICE — KIT EVAC 400CC DIA1/8IN H PAT 12.5IN 3 SPR RND SHP PVC DRN

## (undated) DEVICE — TOWEL,OR,DSP,ST,BLUE,STD,6/PK,12PK/CS: Brand: MEDLINE

## (undated) DEVICE — GLOVE ORANGE PI 8   MSG9080

## (undated) DEVICE — THE MILL DISPOSABLE - FINE

## (undated) DEVICE — LUMBAR LAMINECTOMY: Brand: MEDLINE INDUSTRIES, INC.

## (undated) DEVICE — READY WET SKIN SCRUB TRAY-LF: Brand: MEDLINE INDUSTRIES, INC.

## (undated) DEVICE — DRAPE,REIN 53X77,STERILE: Brand: MEDLINE

## (undated) DEVICE — DRESSING ADH N ADH 8X35 IN 6X175 IN SFT CLTH MEDIPORE +

## (undated) DEVICE — Device

## (undated) DEVICE — SYRINGE 20ML LL S/C 50

## (undated) DEVICE — GLOVE SURG 8.5 PF POLYMER WHT STRL SIGN LTX ESSENTIAL LTX

## (undated) DEVICE — ADHESIVE SKIN CLOSURE TOP 36 CC HI VISC DERMBND MINI

## (undated) DEVICE — LOCATOR RAD PASS SPHR MRK NAVIGATION BALL DISP STLTH STN

## (undated) DEVICE — TUBING SUCT 12FR MAL ALUM SHFT FN CAP VENT UNIV CONN W/ OBT

## (undated) DEVICE — GLOVE SURG SZ 65 THK91MIL LTX FREE SYN POLYISOPRENE

## (undated) DEVICE — SPHERE EYE 1 MRK GUIDANCE PASS STEALTHSTATION 1PK/EA

## (undated) DEVICE — BLADE ES L6IN ELASTOMERIC COAT EXT DURABLE BEND UPTO 90DEG

## (undated) DEVICE — 3M™ IOBAN™ 2 ANTIMICROBIAL INCISE DRAPE 6650EZ: Brand: IOBAN™ 2

## (undated) DEVICE — ELECTRODE PT RET AD L9FT HI MOIST COND ADH HYDRGEL CORDED

## (undated) DEVICE — APPLICATOR PREP 26ML 0.7% IOD POVACRYLEX 74% ISO ALC ST

## (undated) DEVICE — HYPODERMIC SAFETY NEEDLE: Brand: MAGELLAN

## (undated) DEVICE — GLOVE ORANGE PI 7   MSG9070

## (undated) DEVICE — GOWN,SIRUS,FABRNF,XL,20/CS: Brand: MEDLINE

## (undated) DEVICE — ADHESIVE SKIN CLSR 0.7ML TOP DERMBND ADV